# Patient Record
Sex: FEMALE | Race: BLACK OR AFRICAN AMERICAN | NOT HISPANIC OR LATINO | Employment: FULL TIME | URBAN - METROPOLITAN AREA
[De-identification: names, ages, dates, MRNs, and addresses within clinical notes are randomized per-mention and may not be internally consistent; named-entity substitution may affect disease eponyms.]

---

## 2017-01-04 ENCOUNTER — TELEPHONE (OUTPATIENT)
Dept: GASTROENTEROLOGY | Facility: CLINIC | Age: 57
End: 2017-01-04

## 2017-01-05 ENCOUNTER — TELEPHONE (OUTPATIENT)
Dept: GASTROENTEROLOGY | Facility: CLINIC | Age: 57
End: 2017-01-05

## 2017-01-05 NOTE — TELEPHONE ENCOUNTER
Called and spoke w pt  Ok w moving appt to 30th at 1  But to come in at 1230  Pt verbalized understanding  Mailing appt slip

## 2017-01-25 ENCOUNTER — TELEPHONE (OUTPATIENT)
Dept: GASTROENTEROLOGY | Facility: CLINIC | Age: 57
End: 2017-01-25

## 2017-01-30 ENCOUNTER — OFFICE VISIT (OUTPATIENT)
Dept: GASTROENTEROLOGY | Facility: CLINIC | Age: 57
End: 2017-01-30
Payer: COMMERCIAL

## 2017-01-30 VITALS
HEART RATE: 106 BPM | WEIGHT: 152.13 LBS | RESPIRATION RATE: 20 BRPM | SYSTOLIC BLOOD PRESSURE: 139 MMHG | BODY MASS INDEX: 25.97 KG/M2 | TEMPERATURE: 99 F | HEIGHT: 64 IN | DIASTOLIC BLOOD PRESSURE: 84 MMHG

## 2017-01-30 DIAGNOSIS — E55.9 VITAMIN D DEFICIENCY: ICD-10-CM

## 2017-01-30 DIAGNOSIS — K50.812 CROHN'S DISEASE OF BOTH SMALL AND LARGE INTESTINE WITH INTESTINAL OBSTRUCTION: Primary | ICD-10-CM

## 2017-01-30 DIAGNOSIS — C50.919 MALIGNANT NEOPLASM OF FEMALE BREAST, UNSPECIFIED LATERALITY, UNSPECIFIED SITE OF BREAST: ICD-10-CM

## 2017-01-30 PROCEDURE — 99999 PR PBB SHADOW E&M-EST. PATIENT-LVL IV: CPT | Mod: PBBFAC,,, | Performed by: INTERNAL MEDICINE

## 2017-01-30 PROCEDURE — 99215 OFFICE O/P EST HI 40 MIN: CPT | Mod: S$GLB,,, | Performed by: INTERNAL MEDICINE

## 2017-01-30 RX ORDER — POLYETHYLENE GLYCOL 3350 17 G/17G
17 POWDER, FOR SOLUTION ORAL DAILY PRN
COMMUNITY
End: 2017-06-15

## 2017-01-30 NOTE — LETTER
February 1, 2017      Jamie Polo MD  [address] [facility] [address]           [facility] - Gastroenterology  [address]  [place] [address]  Phone: [phone]  Fax: [phone]          Patient: Aline Fuller   MR Number: [id number]   YOB: 1960   Date of Visit: 1/30/2017       Dear Dr. Jamie Polo:    Thank you for referring Aline Fuller to me for evaluation. Attached you will find relevant portions of my assessment and plan of care.    If you have questions, please do not hesitate to call me. I look forward to following Aline Fuller along with you.    Sincerely,    Diamond Barbour MD    Enclosure  CC:  No Recipients    If you would like to receive this communication electronically, please contact [email or web address] or [phone] to request more information on [employer] access.    For providers and/or their staff who would like to refer a patient to [facility], please contact us through our one-stop-shop provider referral line, [facility], at [phone].    If you feel you have received this communication in error or would no longer like to receive these types of communications, please e-mail [email or web address]

## 2017-01-30 NOTE — PATIENT INSTRUCTIONS
Instructions:  - labs today  - CT enterography to be scheduled within the next 2-4 weeks  - Avoid all NSAIDs (Advil, Ibuprofen, Motrin, Aspirin, Naprosyn, Aleve)  - Yearly Pap Smears recommended  - Yearly Eye exam  - Yearly Skin exam--wear sun block and hats  - Use antibiotics with caution  - Vaccines needed:      Flu shot yearly      Tetanus every 10 years      Pneumonia 13 vaccine initial then repeat in 5 years and then one at age 63  - Follow up to be determined    Infliximab (Remicade): Biologics - Anti-TNF Agent    - Mechanism of action:  Remicade blocks TNF alpha which plays a role in the inflammatory process for inflammatory bowel disease  - Labs   - labs should be done every 4-6 months while on remicade  - TB test yearly  - hepatitis B testing prior to starting     Remicade Dosage:  - Loading Dose: 5 mg/kg IV week 0, 2, 6 (infused over 2 hours)  - Maintenance Dose: 5 mg/kg mg IV every 8 weeks  - 2-3 hour infusion    Risks of Remicade:  Stop therapy due to adverse event= 10% (10/100)    Please call us immediately if you develop any of the below problems    Allergic reactions  - <2% (2/100) develop infusion site reactions  - RARE- hives (rash), difficulty breathing, chest pain/tightness, high or low blood pressure, swelling of the face and hands, fever or chills    Serious Infections= 3% (3/100)  fever, tiredness, flu, open sores, warm red painful skin    Blood Disorders  fever that doesn't go away, bruising, bleeding, severe paleness    Non-Hodgkins Lymphoma=0.06% (6/10,000)    Drug related lupus-like reaction= 1% (1/100)  chest discomfort or pain that does not go away, shortness of breath, joint pain, rash on the cheeks or arms that gets worse in the sun    Psoriasis  new or worsening red scaly patches or raised bumps on the skin that are filled with pus     Case Reports Only: Multiple Sclerosis and Guillain [place] Syndrome  Numbness/weakness/tingling of your hands and feet, changes in your vision or  seizures    Case Reports Only: New or worsening Congestive Heart Failure  shortness of breath, swelling in your ankles or feet, sudden weight gain    Case Reports Only: Serious Liver Injury  jaundice (yellow skin or eyes), dark brown urine, right-sided abdominal pain, fever, severe itchiness    - All Immunizations ideally should be up to date prior to starting therapy--NO LIVE vaccines during therapy or 8 weeks prior to therapy  - Live Vaccines Include:   --Intranasal Influenza A/B  --Measles, Mumps, Rubella (MMR)  --Rotavirus  --Typhoid (oral)  --Vaccina (Smallpox)  --Varicella (Chicken Pox)  --Yellow Fever  --Zoster

## 2017-01-30 NOTE — MR AVS SNAPSHOT
[facility]  [address]  [facility] [address]  Phone: [phone]  Fax: [phone]                  Aline Fuller   2017 11:00 AM   Office Visit    Description:  Female : 1960   Provider:  Diamond Barbour MD   Department:  [facility]           Reason for Visit     Crohn's Disease           Diagnoses this Visit        Comments    Crohn's disease of both small and large intestine with intestinal obstruction    -  Primary            To Do List           Future Appointments        Provider Department Dept Phone    2017 1:45 PM LAB, SAME DAY [facility] [phone]    2017 9:00 AM [facility] CT6 MOBILE LIMIT 450 LBS [facility] [phone]      Goals (5 Years of Data)     None      [facility] On Call     [facility] On Call Nurse Care Line -  Assistance  Registered nurses in the [facility] Center provide clinical advisement, health education, appointment booking, and other advisory services.  Call for this free service at [phone].             Medications           Message regarding Medications     Verify the changes and/or additions to your medication regime listed below are the same as discussed with your clinician today.  If any of these changes or additions are incorrect, please notify your healthcare provider.        STOP taking these medications     VITAMIN B COMPLEX NO.12-NIACIN ORAL            Verify that the below list of medications is an accurate representation of the medications you are currently taking.  If none reported, the list may be blank. If incorrect, please contact your healthcare provider. Carry this list with you in case of emergency.           Current Medications     amlodipine (NORVASC) 2.5 MG tablet Take 2.5 mg by mouth once daily.    cholecalciferol, vitamin D3, 5,000 unit capsule Take 2,000 Units by mouth. 1 Capsule Oral Every day    cyanocobalamin 1,000 mcg/mL injection  "1,000 mcg every 30 days.     fluticasone (FLONASE) 50 mcg/actuation nasal spray 1 spray by Each Nare route daily as needed.     polyethylene glycol (MIRALAX) 17 gram/dose powder Take 17 g by mouth daily as needed.    potassium chloride (KLOR-CON) 8 MEQ TbSR Take 8 mEq by mouth once daily.     tamoxifen (NOLVADEX) 20 MG Tab Take 20 mg by mouth once daily.    triamcinolone acetonide 0.1% (KENALOG) 0.1 % cream daily as needed.            Clinical Reference Information           Vital Signs - Last Recorded  Most recent update: 1/30/2017 11:14 AM by Phyllis Jaimes RN    BP Pulse Temp Resp Ht Wt    139/84 (BP Location: Right arm, Patient Position: Sitting, BP Method: Automatic) 106 98.6 °F (37 °C) 20 5' 4" (1.626 m) 69 kg (152 lb 1.9 oz)    BMI                26.11 kg/m2          Blood Pressure          Most Recent Value    BP  139/84      Allergies as of 1/30/2017     Sulfa (Sulfonamide Antibiotics)      Immunizations Administered on Date of Encounter - 1/30/2017     None      Orders Placed During Today's Visit     Future Labs/Procedures Expected by Expires    C-reactive protein  1/30/2017 3/31/2018    CBC auto differential  1/30/2017 3/31/2018    Comprehensive metabolic panel  1/30/2017 3/31/2018    CT Enterography Abd_Pelvis With Contrast  1/30/2017 1/30/2018    Hepatitis B core antibody, total  1/30/2017 3/31/2018    Hepatitis B surface antibody  1/30/2017 3/31/2018    Hepatitis B surface antigen  1/30/2017 3/31/2018    Hepatitis C antibody  1/30/2017 3/31/2018    Sedimentation rate, manual  1/30/2017 3/31/2018    Thiopurine Methyltrans, RBC  1/30/2017 3/31/2018    Varicella zoster antibody, IgG  1/30/2017 3/31/2018    Vitamin B12  1/30/2017 3/31/2018    Vitamin D  1/30/2017 3/31/2018      Instructions    Instructions:  - labs today  - CT enterography to be scheduled within the next 2-4 weeks  - Avoid all NSAIDs (Advil, Ibuprofen, Motrin, Aspirin, Naprosyn, Aleve)  - Yearly Pap Smears recommended  - Yearly Eye " exam  - Yearly Skin exam--wear sun block and hats  - Use antibiotics with caution  - Vaccines needed:      Flu shot yearly      Tetanus every 10 years      Pneumonia 13 vaccine initial then repeat in 5 years and then one at age 63  - Follow up to be determined    Infliximab (Remicade): Biologics - Anti-TNF Agent    - Mechanism of action:  Remicade blocks TNF alpha which plays a role in the inflammatory process for inflammatory bowel disease  - Labs   - labs should be done every 4-6 months while on remicade  - TB test yearly  - hepatitis B testing prior to starting     Remicade Dosage:  - Loading Dose: 5 mg/kg IV week 0, 2, 6 (infused over 2 hours)  - Maintenance Dose: 5 mg/kg mg IV every 8 weeks  - 2-3 hour infusion    Risks of Remicade:  Stop therapy due to adverse event= 10% (10/100)    Please call us immediately if you develop any of the below problems    Allergic reactions  - <2% (2/100) develop infusion site reactions  - RARE- hives (rash), difficulty breathing, chest pain/tightness, high or low blood pressure, swelling of the face and hands, fever or chills    Serious Infections= 3% (3/100)  fever, tiredness, flu, open sores, warm red painful skin    Blood Disorders  fever that doesn't go away, bruising, bleeding, severe paleness    Non-Hodgkins Lymphoma=0.06% (6/10,000)    Drug related lupus-like reaction= 1% (1/100)  chest discomfort or pain that does not go away, shortness of breath, joint pain, rash on the cheeks or arms that gets worse in the sun    Psoriasis  new or worsening red scaly patches or raised bumps on the skin that are filled with pus     Case Reports Only: Multiple Sclerosis and Guillain [place] Syndrome  Numbness/weakness/tingling of your hands and feet, changes in your vision or seizures    Case Reports Only: New or worsening Congestive Heart Failure  shortness of breath, swelling in your ankles or feet, sudden weight gain    Case Reports Only: Serious Liver Injury  jaundice (yellow skin or  eyes), dark brown urine, right-sided abdominal pain, fever, severe itchiness    - All Immunizations ideally should be up to date prior to starting therapy--NO LIVE vaccines during therapy or 8 weeks prior to therapy  - Live Vaccines Include:   --Intranasal Influenza A/B  --Measles, Mumps, Rubella (MMR)  --Rotavirus  --Typhoid (oral)  --Vaccina (Smallpox)  --Varicella (Chicken Pox)  --Yellow Fever  --Zoster

## 2017-01-30 NOTE — PROGRESS NOTES
[facility]          Inflammatory Bowel Disease New Patient Consultation Note            Dr. Diamond Barbour    TODAY'S VISIT DATE:  1/30/2017    Reason for Consult:    Chief Complaint   Patient presents with    Crohn's Disease       PCP: Jamie Polo  [address] [facility] / [place] [address]    Referring MD:   Dr. Jamie Polo    History of Present Illness:    Dear. Dr. Jamie Polo    Thank you for requesting a consultation on your patient Aline Fuller who is a 56 y.o. female seen today at the [facility] on 01/30/2017 for inflammatory bowel disease- Crohn's disease.  Pertinent past medical history includes stage 1 breast cancer 6/2014  that was treated with lumpectomy followed by chemo/XRT (6/2014-4/2015) with no recurrence since then and remains on tamoxifen    As you know, Aline Fuller was doing well until in 1972 at which time she had abdominal pain, weight loss, diarrhea.  In 1973 she had exploratory laparotomy at which time 6 inches of colon removed but she recalls she was not given a diagnosis at that time. Between 5767-0598 she did well and had weight gain. IN 1979 she had a flare at which time 6 inches of her colon were removed.  She was officially diagnosed with Crohn's disease. She was on intermittent courses of prednisone for flares about 2 times/year and she recalls also taking flagyl at the time.  She would take prednisone for several months at a time.  She had rash to sulfasalazine and dipentum was not effective long term. She had 6MP though this caused bone marrow suppression and was ineffective after a year of treatment. She had progression of her symptoms and had a bowel obstruction due to stricturing Crohn's disease.  In 1997 she had small bowel resection and pt recalls again about 6 inches removed.  Between 3239-9305 she was on no medications.  In 2001 she recalls having fistula with abscess to buttocks treated with  drainage, antibiotics. She was on no medications from 5682-0864. There was recurrence of same fistulas in 2004 or 2005. She took asacol, colazal, 6MP, imuran. All were ineffective and possible anemia with 6MP/imuran. Overall these were ineffective for her Crohn's disease.  She took humira in approximately 8/2008. She took this for about 3 mos but was discontinued due to rash but pt felt it was ineffective for fistula closure. She had setons due to buttock fistulas in 2010.     She saw Dr. Polo in 2010 and had a colonoscopy in 10/2010 which showed perianal fistulas, anal stricture dilated, active inflammation at the ileocolonic anastomosis which could not be traversed. In 12/2010 she had perianal Crohn's disease with fistulas which was treated by dilation of rectal stricture and placement of setons.  She started on remicade in 2/2011 but she had to hold remicade after first dose due to UTI.  He performed an MRI in 4/2011 at which time she was found to have multiple perianal fistulas connecting to gluteal surface bilaterally of which one appears to cross midline connecting the right and left side of the ischial tuberosity.  There was also circumferential and asymmetric left rectal thickening.  She underwent EUA with seton placement and due to abscess received antibiotics and abscess spontaneously drained. She restarted remicade after treatment of her UTI in 5/2011.  Setons were removed in approximately 5/2011. In approximately 2012 she recalls that her dose frequency was increased to remicade 5 mg/kg every 6 weeks.  In 10/2011 she had setons removed and received cipro and also at that time decision was made to change remicade to every 6 weeks. . Colonoscopy in 8/2012 showed severe anal stenosis traversed after digital dilation with normal colon mucosa but no comment on anastomosis or neoterminal ileum. In 4/2013 was in ER for abdominal pain, diarrhea and fevers and sent home with cipro/flagyl. Dr. Polo felt this  was a gastroenteritis though CT showed active perianal disease. She had EGD due to dysphagia in 5/2013 at which time GE junction stenosis was found and there was minimal mucosal tear with passing the scope. She was diagnosed with stage 1 breast cancer that was treated with lumpectomy followed by chemo/XRT with no recurrence since then. She is on tamoxifen. Since 2012 she continues on remicade 5 mg/kg every 6 weeks though it was held during chemotherapy from 6/2014-4/2015. When she restarted this she took induction followed by maintenance remicade 5 mg/kg every 6 weeks.     She had a colonoscopy in 4/2015 which showed anal stricture that was dilated, inflammation characterized by deep ulcers in ptaches surrounding by normal mucosa in the ileocolonic anastomosis and rectum that was moderate in severity though worsened compared to prior scope.  No treatment change was made at that time.  She had EGD and colonoscopy in 10/2016.  EGD showed severe stenosis at 35 cm from incisors that was balloon dilated which helped her dysphagia but is not taking any PPI treatment. She reports that no one has ever discussed the cause of this stricture with her.  Colonoscopy in 10/2016 showed anal stricture with inflammation found throughout the colon and neoterminal ileum could not be intubated. In the past 18 months she has constipation.  She takes miralax since fall 2016 though she developed diarrhea while taking it. She is having trouble titrating her dose of miralax. She reports some heartburn along with some anxiety.  Patient has no other gastrointestinal/constitutional complaints including no fevers or chills, weight loss, nausea/vomiting (including no hematemesis), dysphagia, abdominal pain. Also patient does not have any extraintestinal manifestations of their inflammatory bowel disease including no eye pain/redness, skin lesions/rashes, joint problems, oral ulcers.    Pertinent Endoscopy/Imaging:  10/7/2010 Colonoscopy:  perianal fistulas and skin tags; anal stricture which was dilated with the index finger; evidence of a prior end-to-side ileo-colonic anastomosis in the ascending colon--this was characterized by congestion, edema, erosion, erythema, and friable mucosa (path-TI--the changes appear mostly acute with erosion and increased neutrophils within the lamina propria, focally producing cryptitis; granulomata are not identified); the exam of the entire colon was otherwise without abnormality  4/18/2011 MRI abdomen & pelvis:  MULTIPLE COMPLEX-APPEARING PERIANAL FISTULAS WHICH CONNECT WITH THE GLUTEAL SURFACE BILATERALLY, AND ONE OF WHICH APPEARS TO CROSS MIDLINE CONNECTING THE RIGHT AND LEFT SIDE AT THE LEVEL OF THE ISCHIAL TUBEROSITY.  NO EVIDENCE OF DEFINITE FISTULOUS COMMUNICATION TO THE VAGINA OR BLADDER;    CIRCUMFERENTIAL AND ASYMMETRIC LEFT RECTAL THICKENING WITHOUT DISCRETE MASS IDENTIFIED.  THIS IS NONSPECIFIC AND COULD BE RELATED TO PRIOR SURGERY OR INFLAMMATORY OR INFECTIOUS PROCESS; FIBROID UTERUS  8/20/2012 Colonoscopy: skin tags; benign-appearing, intrinsic severe stenosis found at the anus that was traversed after dilation with the index finger; normal mucosa in the rectum, sigmoid, proximal & distal transverse colon, proximal & distal ascending colon (path-colonic mucosa with focal architectural distortion; no active inflammation or dysplasia identified)  5/23/2013 EGD: benign-appearing, intrinsic moderate stenosis was found at the GE junction; minimal mucosal tear with passage of the endoscope; entire examined stomach and duodenum was normal  4/25/2014 US: Normal  4/7/2015 Colonoscopy: skin tags; anal stricture dilated with index finger up to PIP; inflammation characterized by deep ulcerations was found as patches surrounded by normal mucosa in the ileocolonic anastomosis and rectum--moderate in severity and worsened when compared to previous findings (path-cecum--colonic mucosa with acute inflammation,  ulceration and inflamed granulation tissue, no granulomas or dysplasia); the sigmoid, descending, splenic flexure, and ascending colons were spared  6/27/2016 CXR: normal  10/24/2016 EGD: severe benign-appearing, intrinsic stenosis was found at 35 cm from the incisors-dilation with a 10-11-12 mm pyloric balloon dilator was performed--reexamined and showed moderate improvement in luminal narrowing; entire examined stomach and duodenum was normal (no biopsies taken)  10/24/2016 Colonoscopy: anal stricture; inflammation characterized by serpentine ulcerations was found as medium-sized patches surrounded by normal mucosa in the rectum, as medium-sized patches surrounded by normal mucosa in the recto-sigmoid colon, as medium-sized patches surrounded by normal mucosa in the descending colon, as medium-sized patches surrounded by normal mucosa in the transverse colon, as medium-sized patches  surrounded by normal mucosa in the ascending colon and as  medium-sized patches surrounded by normal mucosa in the terminal  ileum. No sites were spared. This was moderate in severity, and when compared to previous examinations, the findings are worsened. Neoterminal ileum could not be intubated. (no biopsies taken)    Previous Surgeries:  1973 large bowel resection  1979 further colonic resection  12/17/2010: EUA, dilation of rectal stricture and placement of setons Xs 4    Pertinent Labs:  No results found for: STOOLCULTURE, CZAZHGYMRA2A, PBTLPRIKFI6C, CDIFFICILEAN, CDIFFTOX, CDIFFICILEBY  Lab Results   Component Value Date    CRP 8.0 06/27/2016     No results found for: [id number]  No results found for: HEPBIGM, HEPBCAB, HEPBSURFABQU  No results found for: VARICELLAZOS, VARICELLAINT  Lab Results   Component Value Date    NIL 0.06 06/27/2016    TBAG 0.06 06/27/2016    TBAGNIL 0.00 06/27/2016    MITOGENNIL >10.00 06/27/2016    TBGOLD Negative 06/27/2016     No results found for: TPMTRESULT  No results found for: ANSADAINIT,  INFLIXIMAB, INFLIXINTERP    Prior IBD Therapies:  Prednisone  Sulfasalazine- rash  Dipentum- ineffective  6MP- bone marrow suppression  Flagyl/Cipro- effective   Asacol- ineffective  Colazal- ineffective  Imuran  Humira-rash- 2008 or 2009    Current IBD Therapies:  Remicade 5 mg/kg every 6 weeks; Remicade 8117-9645--reinitiated 2016 with induction and then every 6 weeks maintenance      Vaccinations:  Flu shot: 2016  Chickenpox status/Varicella vaccine: never had chickenpox  No results found for: VARICELLAZOS, VARICELLAINT  Tetanus: not sure  Pneumonia vaccine: had one but not sure when she has had one   Hepatitis B: 2011  Lab Results   Component Value Date    HEPBSAB Negative 09/15/2010   Hepatitis A: 2011  HPV: NA   Meningococcal: NA     NSAID use/indication:  no    Narcotic use:  No    Alternative/Complementary Meds for IBD:  No    Review of Systems   Constitutional: Negative for chills, fever and weight loss.   Eyes: Negative for blurred vision, pain and redness.   Respiratory: Negative for cough and shortness of breath.    Cardiovascular: Negative for chest pain.   Gastrointestinal: Positive for heartburn. Negative for abdominal pain, nausea and vomiting.   Genitourinary: Negative for dysuria and hematuria.   Musculoskeletal: Negative for back pain.   Skin: Negative for rash.   Psychiatric/Behavioral: Negative for depression. The patient is nervous/anxious.      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   has a past surgical history that includes Colonoscopy; Esophagogastroduodenoscopy; colon resections; Appendectomy; Knee surgery (left); Breast lumpectomy (Right); fistula repairs; Colonoscopy (N/A, 10/24/2016); Upper gastrointestinal  endoscopy; and Colon surgery.    Family History: family history includes Breast cancer in her sister; Cancer (age of onset: 60) in her mother; Colon cancer in her mother; Heart attack in her father; Heart disease in her brother and paternal uncle; Irritable bowel syndrome in her sister; Ovarian cancer in her maternal aunt; Pancreatic cancer in her maternal grandmother; Stomach cancer in her maternal uncle. There is no history of Celiac disease, Cirrhosis, Colon polyps, Crohn's disease, Cystic fibrosis, Esophageal cancer, Hemochromatosis, Inflammatory bowel disease, Liver cancer, Liver disease, Rectal cancer, Ulcerative colitis, or Gadiel's disease..     Social History:  reports that she has never smoked. She has never used smokeless tobacco. She reports that she does not drink alcohol or use illicit drugs.    Review of patient's allergies indicates:   Allergen Reactions    Sulfa (sulfonamide antibiotics)      Other reaction(s): Rash     Outpatient Prescriptions Marked as Taking for the 1/30/17 encounter (Office Visit) with Diamond Barbour MD   Medication Sig Dispense Refill    amlodipine (NORVASC) 2.5 MG tablet Take 2.5 mg by mouth once daily.      cholecalciferol, vitamin D3, 5,000 unit capsule Take 2,000 Units by mouth. 1 Capsule Oral Every day      cyanocobalamin 1,000 mcg/mL injection 1,000 mcg every 30 days.   1    fluticasone (FLONASE) 50 mcg/actuation nasal spray 1 spray by Each Nare route daily as needed.   0    INFLIXIMAB (REMICADE IV) Inject 5 mg/kg into the vein every 6 weeks.      polyethylene glycol (MIRALAX) 17 gram/dose powder Take 17 g by mouth daily as needed.      potassium chloride (KLOR-CON) 8 MEQ TbSR Take 8 mEq by mouth once daily.       tamoxifen (NOLVADEX) 20 MG Tab Take 20 mg by mouth once daily.      triamcinolone acetonide 0.1% (KENALOG) 0.1 % cream daily as needed.       [DISCONTINUED] POLYETHYLENE GLYCOL 3350 (MIRALAX ORAL) Take by mouth.       Current Facility-Administered  Medications for the 1/30/17 encounter (Office Visit) with Diamond Barbour MD   Medication Dose Route Frequency Provider Last Rate Last Dose    acetaminophen tablet 1,000 mg  1,000 mg Oral PRN Jamie Polo MD   1,000 mg at 04/25/14 1223    acetaminophen tablet 1,000 mg  1,000 mg Oral PRN Jamie Polo MD   1,000 mg at 07/11/14 1312    diphenhydrAMINE injection 25 mg  25 mg Intravenous PRN Jamie Polo MD   25 mg at 04/25/14 1223    diphenhydrAMINE injection 25 mg  25 mg Intravenous PRN Jamie Polo MD   25 mg at 07/11/14 1317       Vital Signs:  BP: 139/84 Temp: 98.6 °F (37 °C) Pulse: 106 Resp: 20  Weight: 69 kg (152 lb 1.9 oz)      Physical Exam   Constitutional: She is oriented to person, place, and time. She appears well-developed.   HENT:   Mouth/Throat: Oropharynx is clear and moist. No oral lesions.   No oral ulcers or thrush   Eyes: Conjunctivae are normal. Pupils are equal, round, and reactive to light.   Cardiovascular: Normal rate and regular rhythm.    Pulmonary/Chest: Effort normal and breath sounds normal.   Abdominal: Soft. There is no tenderness.   Musculoskeletal:        Right lower leg: She exhibits no swelling.        Left lower leg: She exhibits no swelling.   Neurological: She is alert and oriented to person, place, and time.   Skin: No rash noted.   Psychiatric: She has a normal mood and affect.   Nursing note and vitals reviewed.    Labs:  No results found for: STOOLCULTURE, [id number], [id number], CDIFFICILEAN, CDIFFTOX, CDIFFICILEBY  Lab Results   Component Value Date    WBC 6.24 06/27/2016    HGB 10.2 (L) 06/27/2016    HCT 31.6 (L) 06/27/2016    MCV 88 06/27/2016     (H) 06/27/2016    ALT 24 06/27/2016    AST 24 06/27/2016    ALKPHOS 94 06/27/2016    BILITOT 0.3 06/27/2016    SEDRATE 58 (H) 06/27/2016    CRP 8.0 06/27/2016     Lab Results   Component Value Date    HEPBSAB Negative 09/15/2010     No results found for: VARICELLAZOS, VARICELLAINT  Lab Results    Component Value Date    NIL 0.06 06/27/2016    TBAG 0.06 06/27/2016    TBAGNIL 0.00 06/27/2016    MITOGENNIL >10.00 06/27/2016    TBGOLD Negative 06/27/2016     No results found for: TPMTRESULT  No results found for: ANSADAINIT, INFLIXIMAB, INFLIXINTERP    Assessment/Plan:  Aline Fuller is a 56 y.o. female with fistulizing ileocolonic Crohn's disease with symptom onset in 1972 and diagnosis in 1979 and multiple surgeries including: Ex lap with 6 inches of colon removed (1973), Ex lap with 6 inches of colon removed (1979), and Ex lap with 6 inches of colon removed (1997).  She has a pertinent past medical history of breast cancer.  She initially started with symptoms of abdominal pain, weight loss, and diarrhea in 1972 and had an ex lap in 1973 with 6 inches of colon removed that was non-diagnostic for CD.  She was not diagnosed until 1979 after her second ex lap with 6 inches of small bowel removed.  She was initially treated with several courses of prednisone.  Early in her diagnosis, she took sulfasalazine though it was discontinued due to a rash, dipentum though it was not effective long term, and imuran though it had to be discontinued due to bone marrow suppression.  Her symptoms progressed which resulted in an SBO that prompted her third ex lap in 1997 that resulted in 6 inches of small bowel being ressected.  She was in clinic remission and on no medications from 1997 to 2001 at which time she recalls having a fistula and and abscess that required I&D and ABX.  She had recurrence of fistula and abscess in 2004 or 2005.  She also took asacol, colazal, 6MP, and imuran that were all ineffective though possible anemia with 6MP/Imuran.  In 2008 she started humira with induction and maintenance but discontinued after 3 months because patient broke out with a rash though she also felt it was ineffective.   She established care with Dr. Polo in 2010 and had a colonoscopy 10/2010 which showed fistulas, anal  stricture dilated, and active inflammation at the ileocolonic anastomosis that could not be traversed.  In 12/2010 she had perianal Crohn's disease that was treated with dilation of rectal stricture and seton placement.  She started remicade 2/2011.  MRI 4/2011 showed multiple perianal fistulas with seton removal in approximately 5/2011.  At some time in 2012, her remicade dose frequency was increased to 5 mg/kg to every 6 weeks.  She was diagnosed with stage 1 breast cancer 6/2014  that was treated with lumpectomy followed by chemo/XRT with no recurrence since then and remains on tamoxifen. Since 2012 she continues on remicade 5 mg/kg every 6 weeks though it was held during chemotherapy from 6/2014-4/2015 and restarted with induction and maintenance of 5 mg/kg every 6 weeks.   Her most recent colonoscopy 10/2016 showed continued anal stricture with inflammation throughout the colon and neoterminal ileum could not be intubated.  She currently has stable symptoms with1 formed BM/day to every other day with some constipation for the last 18 months.      Ms Fuller has longstanding fistulizing (perianal fistula/abscess) Crohn's disease with multiple surgeries including three partial colectomies (per patient 6 inches of her colon taken out each time) and also carries a history of breast cancer that is in remission. Her remicade was held during the time of her breast cancer treatment so she could have developed abs at this time.  She has a colonoscopy 10/2016 which shows worsening Crohn's disease per Dr. Polo and anastomotic stricture. She reports worsening constipation. I am going to start by getting SB imaging to evaluate the neoterminal ileum that cannot be evaluated endoscopically and then based on these results see if we can proceed with medical therapy or surgery. I would also like to get remicade levels/abs done before her next infusion and we will coordinate this after her visit today.  We will also try to  coordinate same day visit and testing given distance patient is driving.     # Fistulizing ileocolonic Crohn's disease with multiple surgeries including: Ex lap with 6 inches of colon removed (1973), Ex lap with 6 inches of colon removed (1979), and Ex lap with 6 inches of colon removed (1997) with worsening disease on colonoscopy and anastomotic stricture with no imaging or endoscopy of the small intestine above stricture  - we had a long discussion with patient regarding epidemiology, potential causes/triggers (avoiding NSAIDS (stop aleve), antibiotics if possible, stress and smoking), diagnosis, management goals and treatment options  - basic labs: CBC, CMP, ESR, CRP, vitamin B12, vitamin D  - drug monitoring labs: CBC/LFTs with every infusion; Hep B testing today, TPMT today, TB Quantiferon negative 6/2016, discussed remicade levels/abs that we may consider with next infusion  - continue remicade 5 mg/kg every 6 weeks   - CT Enterography to evaluate small bowel above stricture to help determine future treatment plan- medical therapy vs surgery    # History of Stage 1 Breast Cancer 6/2014    - treated with lumpectomy followed by chemo/XRT (6/2014-4/2015)  - no recurrence since completion of treatment  - remain in tamoxifen with close follow-up  - will need to keep this in mind regarding her immunosuppression related to her Crohn's disease    # IBD specific health maintenance- long term immunosuppression:  Colorectal cancer risk:    Risks factors: >1/3 of colon involved with colitis and >8-10 years of IBD duration  - colonoscopy:  every 1-2 years for surveillance once in endoscopic remission    Pap smear recommended yearly   Opthamologic exam recommended yearly    Dermatologic exam recommended yearly     Bone health:  Risk of osteopenia/osteoporosis:  Risks factors: none  Vitamin D: No results found for: [id number]   - currently taking Vitamin D3 5000 IU daily    Vaccine counseling:  - No LIVE VACCINES--reminded  in clinic today  - VZV IgG, HBsAb today   - Tetanus every and pneumonia vaccine recommended if not up to date    Follow up: to be determined after CT Enterography    Total visit time was 60 minutes, more than 50% of which was spent in face-to-face counseling with patient regarding evaluation and management of her Crohn's disease.     Thank you again for sending Aline Fuller to see Dr. Diamond Barbour today at the [facility]. Please don't hesitate to contact Dr. Barbour if there are any questions regarding this evaluation, or if you have any other patients with inflammatory bowel disease for whom you would like a consultation. You can reach Dr. Barbour at [phone] or by email at [email or web address]    Diamond Barbour MD  Department of Gastroenterology  Medical Director, Inflammatory Bowel Disease

## 2017-01-31 PROBLEM — K50.812 CROHN'S DISEASE OF BOTH SMALL AND LARGE INTESTINE WITH INTESTINAL OBSTRUCTION: Status: ACTIVE | Noted: 2017-01-31

## 2017-01-31 PROBLEM — E55.9 VITAMIN D DEFICIENCY: Status: ACTIVE | Noted: 2017-01-31

## 2017-02-01 DIAGNOSIS — K50.812 CROHN'S DISEASE OF BOTH SMALL AND LARGE INTESTINE WITH INTESTINAL OBSTRUCTION: Primary | ICD-10-CM

## 2017-02-01 NOTE — PROGRESS NOTES
Called patient and reviewed all lab test results  - ESR elevated  - she is on vit D 3 2000 IU daily- vit D 25  - would like to coordinate remicade levels/abs with CT so will call my office if able to; the date would be 2/23. CT currently scheduled for 2/27  - she will call if able to change date to 2/23 and remicade levels/abs orders already in and would need to be scheduled on 2/23 at [facility]

## 2017-02-02 ENCOUNTER — TELEPHONE (OUTPATIENT)
Dept: GASTROENTEROLOGY | Facility: CLINIC | Age: 57
End: 2017-02-02

## 2017-02-27 ENCOUNTER — HOSPITAL ENCOUNTER (OUTPATIENT)
Dept: RADIOLOGY | Facility: HOSPITAL | Age: 57
Discharge: HOME | End: 2017-02-27
Attending: INTERNAL MEDICINE
Payer: COMMERCIAL

## 2017-02-27 DIAGNOSIS — K50.812 CROHN'S DISEASE OF BOTH SMALL AND LARGE INTESTINE WITH INTESTINAL OBSTRUCTION: ICD-10-CM

## 2017-02-27 PROCEDURE — 74177 CT ABD & PELVIS W/CONTRAST: CPT | Mod: 26,,, | Performed by: RADIOLOGY

## 2017-02-27 PROCEDURE — 25500020 PHARM REV CODE 255: Performed by: INTERNAL MEDICINE

## 2017-02-27 PROCEDURE — 74177 CT ABD & PELVIS W/CONTRAST: CPT | Mod: TC

## 2017-02-27 RX ADMIN — IOHEXOL 120 ML: 350 INJECTION, SOLUTION INTRAVENOUS at 11:02

## 2017-03-10 ENCOUNTER — TELEPHONE (OUTPATIENT)
Dept: GASTROENTEROLOGY | Facility: CLINIC | Age: 57
End: 2017-03-10

## 2017-03-10 NOTE — TELEPHONE ENCOUNTER
----- Message from Diamond Barbour MD sent at 3/10/2017  4:25 PM CST -----  Helene please fax the CTE report to Dr. Adilene Stephen (GYN doctor for patient)  Dr. Stephen's office number is [phone].     Thanks  SS

## 2017-03-10 NOTE — TELEPHONE ENCOUNTER
I had a long discussion with Ms Jonny reviewing recent CTE results and IFX levels and recommend remicade 10 mg/kg every 6 weeks due to undetectable trough levels with no abs.  Plan to repeat colonoscopy in mid July with followup on same day with Dr. Barbour after 3 mos of higher dose of remicade.     Dr. Polo is prescribing her remicade so I will have him increase the dose given its in [place] and once approved 3 mos later make her appt for colonoscopy andf/u    Pt previously did not wish to come for appt to discuss these results and wished to do phone discussion but now willing to come for next appt in July 2017

## 2017-03-16 ENCOUNTER — TELEPHONE (OUTPATIENT)
Dept: GASTROENTEROLOGY | Facility: CLINIC | Age: 57
End: 2017-03-16

## 2017-03-16 NOTE — TELEPHONE ENCOUNTER
Call patient and let her know that I have talked to Dr. Polo who will be prescribing the higher dose of remicade at 10 mg/kg every 6 weeks.  If any issues please do call but I would like to have her see me in about 2 months so please schedule.

## 2017-03-16 NOTE — TELEPHONE ENCOUNTER
Called and spoke to pt  Advised of dr zavala note  Scheduled appt for June 15th  This will be 2 months after the new infusion dose increase  Pt verbalized understanding  Mailing appt slip

## 2017-03-23 ENCOUNTER — TELEPHONE (OUTPATIENT)
Dept: GASTROENTEROLOGY | Facility: CLINIC | Age: 57
End: 2017-03-23

## 2017-03-23 NOTE — TELEPHONE ENCOUNTER
----- Message from Jamie Polo MD sent at 3/23/2017 12:42 PM CDT -----  Contact: Ms Waldrop/   infusion clinic [phone]  6 weeks from the last dose and duration is for a year.    ----- Message -----     From: Bouchra Zhang MA     Sent: 3/23/2017  11:42 AM       To: MD Dr. Christ Michaels,  Please tell me what to write for the duration start and for how long   Thanks  ----- Message -----     From: Cuca Parnell     Sent: 3/23/2017  11:32 AM       To: [facility] Staff    Ms Waldrop states received fax order for patient Remicade IV for six weeks.    Need duration date for how long. Need diagnosis written by code  Fax number is [phone].  Ms Palma/   infusion clinic [phone]

## 2017-04-13 ENCOUNTER — TELEPHONE (OUTPATIENT)
Dept: GASTROENTEROLOGY | Facility: CLINIC | Age: 57
End: 2017-04-13

## 2017-04-13 NOTE — TELEPHONE ENCOUNTER
Spoke with cain and informed her the patient authorization was good until 7/2018 faxed over approval letter

## 2017-04-13 NOTE — TELEPHONE ENCOUNTER
----- Message from Joy Reyes sent at 4/13/2017  2:37 PM CDT -----  Contact: Jenifer xie/[facility]# [phone]  Jarrett xie/[facility] states she was calling to speak to the nurse about the Pt. Procedure 4/14 and would like a phone call back# [phone]

## 2017-06-15 ENCOUNTER — OFFICE VISIT (OUTPATIENT)
Dept: GASTROENTEROLOGY | Facility: CLINIC | Age: 57
End: 2017-06-15
Payer: COMMERCIAL

## 2017-06-15 VITALS
HEIGHT: 64 IN | DIASTOLIC BLOOD PRESSURE: 80 MMHG | TEMPERATURE: 99 F | RESPIRATION RATE: 13 BRPM | BODY MASS INDEX: 25.6 KG/M2 | SYSTOLIC BLOOD PRESSURE: 128 MMHG | WEIGHT: 149.94 LBS | HEART RATE: 92 BPM

## 2017-06-15 DIAGNOSIS — K50.812 CROHN'S DISEASE OF BOTH SMALL AND LARGE INTESTINE WITH INTESTINAL OBSTRUCTION: Primary | ICD-10-CM

## 2017-06-15 PROCEDURE — 99999 PR PBB SHADOW E&M-EST. PATIENT-LVL IV: CPT | Mod: PBBFAC,,, | Performed by: INTERNAL MEDICINE

## 2017-06-15 PROCEDURE — 99215 OFFICE O/P EST HI 40 MIN: CPT | Mod: S$GLB,,, | Performed by: INTERNAL MEDICINE

## 2017-06-15 RX ORDER — BISACODYL 5 MG
5 TABLET, DELAYED RELEASE (ENTERIC COATED) ORAL DAILY PRN
COMMUNITY
End: 2021-07-13

## 2017-06-15 NOTE — PROGRESS NOTES
[facility]             Inflammatory Bowel Disease Follow-up  Note            Dr. Diamond Barbour  TODAY'S VISIT DATE:  6/15/2017    Chief Complaint:       PCP: Jamie Polo    Previous History:  Aline Fuller is a 57 y.o. female with fistulizing ileocolonic Crohn's disease with symptom onset in 1972 and diagnosis in 1979 and multiple surgeries including: Ex lap with 6 inches of colon removed (1973), Ex lap with 6 inches of colon removed (1979), and Ex lap with 6 inches of colon removed (1997).  She has a pertinent past medical history of breast cancer.  She initially started with symptoms of abdominal pain, weight loss, and diarrhea in 1972 and had an ex lap in 1973 with 6 inches of colon removed that was non-diagnostic for CD.  She was not diagnosed until 1979 after her second ex lap with 6 inches of small bowel removed.  She was initially treated with several courses of prednisone.  Early in her diagnosis, she took sulfasalazine though it was discontinued due to a rash, dipentum though it was not effective long term, and imuran though it had to be discontinued due to bone marrow suppression.  Her symptoms progressed which resulted in an SBO that prompted her third ex lap in 1997 that resulted in 6 inches of small bowel being ressected.  She was in clinic remission and on no medications from 1997 to 2001 at which time she recalls having a fistula and and abscess that required I&D and ABX.  She had recurrence of fistula and abscess in 2004 or 2005.  She also took asacol, colazal, 6MP, and imuran that were all ineffective though possible anemia with 6MP/Imuran.  In 2008 she started humira with induction and maintenance but discontinued after 3 months because patient broke out with a rash though she also felt it was ineffective.   She established care with Dr. Polo in 2010 and had a colonoscopy 10/2010 which showed fistulas, anal stricture dilated, and active inflammation at the  ileocolonic anastomosis that could not be traversed.  In 12/2010 she had perianal Crohn's disease that was treated with dilation of rectal stricture and seton placement.  She started remicade 2/2011.  MRI 4/2011 showed multiple perianal fistulas with seton removal in approximately 5/2011.  At some time in 2012, her remicade dose frequency was increased to 5 mg/kg to every 6 weeks.  She was diagnosed with stage 1 breast cancer 6/2014  that was treated with lumpectomy followed by chemo/XRT with no recurrence since then and remains on tamoxifen. Since 2012 she continues on remicade 5 mg/kg every 6 weeks though it was held during chemotherapy from 6/2014-4/2015 and restarted with induction and maintenance of 5 mg/kg every 6 weeks.   Her most recent colonoscopy 10/2016 showed continued anal stricture with inflammation throughout the colon and neoterminal ileum could not be intubated.  She currently has stable symptoms with1 formed BM/day to every other day with some constipation for the last 18 months.     Interval History:  - current IBD meds: Remicade 10 mg/kg every 6 weeks  (prescribed by Dr. Polo and pt has infusions in [place], [place])  - 2/2017 [place]--trough IFX levels/ab- undetectable trough levels with no Abs  - 2/27/17 CTE: Active CD involving the descending and sigmoid colon and rectum.  Decreased caliber at the ileocolic anastomosis with possible mucosal hyperenhancement.  Note is made of focal decreased caliber within the rectum, possibly transient.  Multiple perianal fistulas without evidence for abscess; heterogeneous enhancement within the uterus.  Appearance is not certainly secondary to leiomyomas.  Given postmenopausal age group, recommend further evaluation with pelvic ultrasound to assess endometrium.  - 1 soft Bowel Movements/day  - constitutional/GI symptoms: no fevers/chills, weight loss, dysphagia, GERD, abdominal pain  - extraintestinal manifestations: no eye pain/redness, skin lesions/rashes, liver  problems, joint pain/swelling/stiffness    Previous Surgeries:  1973 large bowel resection  1979 further colonic resection  12/17/2010: EUA, dilation of rectal stricture and placement of setons Xs 4    Pertinent Endoscopy/Imaging:  10/7/2010 Colonoscopy: perianal fistulas and skin tags; anal stricture which was dilated with the index finger; evidence of a prior end-to-side ileo-colonic anastomosis in the ascending colon--this was characterized by congestion, edema, erosion, erythema, and friable mucosa (path-TI--the changes appear mostly acute with erosion and increased neutrophils within the lamina propria, focally producing cryptitis; granulomata are not identified); the exam of the entire colon was otherwise without abnormality  4/18/2011 MRI abdomen & pelvis:  MULTIPLE COMPLEX-APPEARING PERIANAL FISTULAS WHICH CONNECT WITH THE GLUTEAL SURFACE BILATERALLY, AND ONE OF WHICH APPEARS TO CROSS MIDLINE CONNECTING THE RIGHT AND LEFT SIDE AT THE LEVEL OF THE ISCHIAL TUBEROSITY.  NO EVIDENCE OF DEFINITE FISTULOUS COMMUNICATION TO THE VAGINA OR BLADDER;    CIRCUMFERENTIAL AND ASYMMETRIC LEFT RECTAL THICKENING WITHOUT DISCRETE MASS IDENTIFIED.  THIS IS NONSPECIFIC AND COULD BE RELATED TO PRIOR SURGERY OR INFLAMMATORY OR INFECTIOUS PROCESS; FIBROID UTERUS  8/20/2012 Colonoscopy: skin tags; benign-appearing, intrinsic severe stenosis found at the anus that was traversed after dilation with the index finger; normal mucosa in the rectum, sigmoid, proximal & distal transverse colon, proximal & distal ascending colon (path-colonic mucosa with focal architectural distortion; no active inflammation or dysplasia identified)  5/23/2013 EGD: benign-appearing, intrinsic moderate stenosis was found at the GE junction; minimal mucosal tear with passage of the endoscope; entire examined stomach and duodenum was normal  4/25/2014 US: Normal  4/7/2015 Colonoscopy: skin tags; anal stricture dilated with index finger up to PIP; inflammation  characterized by deep ulcerations was found as patches surrounded by normal mucosa in the ileocolonic anastomosis and rectum--moderate in severity and worsened when compared to previous findings (path-cecum--colonic mucosa with acute inflammation, ulceration and inflamed granulation tissue, no granulomas or dysplasia); the sigmoid, descending, splenic flexure, and ascending colons were spared  6/27/2016 CXR: normal  10/24/2016 EGD: severe benign-appearing, intrinsic stenosis was found at 35 cm from the incisors-dilation with a 10-11-12 mm pyloric balloon dilator was performed--reexamined and showed moderate improvement in luminal narrowing; entire examined stomach and duodenum was normal (no biopsies taken)  10/24/2016 Colonoscopy: anal stricture; inflammation characterized by serpentine ulcerations was found as medium-sized patches surrounded by normal mucosa in the rectum, as medium-sized patches surrounded by normal mucosa in the recto-sigmoid colon, as medium-sized patches surrounded by normal mucosa in the descending colon, as medium-sized patches surrounded by normal mucosa in the transverse colon, as medium-sized patches  surrounded by normal mucosa in the ascending colon and as  medium-sized patches surrounded by normal mucosa in the terminal  ileum. No sites were spared. This was moderate in severity, and when compared to previous examinations, the findings are worsened. Neoterminal ileum could not be intubated. (no biopsies taken)  2/27/17 CTE: Active CD involving the descending and sigmoid colon and rectum.  Decreased caliber at the ileocolic anastomosis with possible mucosal hyperenhancement.  Note is made of focal decreased caliber within the rectum, possibly transient.  Multiple perianal fistulas without evidence for abscess; heterogeneous enhancement within the uterus.  Appearance is not certainly secondary to leiomyomas.  Given postmenopausal age group, recommend further evaluation with pelvic  ultrasound to assess endometrium.    Pertinent Labs:  6/2016 TB quantiferon neg  1/30/17 CRP 3.2  1/30/17 TPMT normal  1/30/17 vit D 25  1/30/17 hepatitis B testing negative   1/30/17 VZV IgG negative  2/2017 [place] IFX level <1.0, Ab neg    Prior IBD Therapies:  Prednisone  Sulfasalazine- rash  Dipentum- ineffective  6MP- bone marrow suppression  Flagyl/Cipro- effective   Asacol- ineffective  Colazal- ineffective  Imuran  Humira-rash- 2008 or 2009    Current IBD Therapies:  Remicade 5 mg/kg every 6 weeks; Remicade 6632-2092--reinitiated 2016 with induction and then every 6 weeks maintenance      Vaccinations:  Flu shot: 2016  Chickenpox status/Varicella vaccine: never had chickenpox, VZV IgG negative  Tetanus: not sure  Pneumonia vaccine: had one but not sure when she has had one   Hepatitis B: 2011  Lab Results   Component Value Date    HEPBSAB Negative 09/15/2010   Hepatitis A: 2011  HPV: NA   Meningococcal: NA     NSAID use/indication:  no    Narcotic use:  No    Alternative/Complementary Meds for IBD:  No    Review of Systems   Constitutional: Negative for chills, fever and weight loss.   HENT:        No oral ulcers, dysphagia, oral thrush   Eyes: Negative for blurred vision, pain and redness.   Respiratory: Negative for cough and shortness of breath.    Cardiovascular: Negative for chest pain.   Gastrointestinal: Positive for heartburn. Negative for abdominal pain, nausea and vomiting.   Genitourinary: Negative for dysuria and hematuria.   Musculoskeletal: Negative for back pain and joint pain.   Skin: Negative for rash.   Psychiatric/Behavioral: Negative for depression. The patient is not nervous/anxious and does not have insomnia.      All Medical History/Surgical History/Family History/Social History/Allergies have been reviewed and updated in EMR    Review of patient's allergies indicates:   Allergen Reactions    Sulfa (sulfonamide antibiotics)      Other reaction(s): Rash     Outpatient Prescriptions  "Marked as Taking for the 6/15/17 encounter (Office Visit) with Diamond Barbour MD   Medication Sig Dispense Refill    bisacodyl (DULCOLAX) 5 mg EC tablet Take 5 mg by mouth daily as needed for Constipation.      cholecalciferol, vitamin D3, 5,000 unit capsule Take 2,000 Units by mouth. 1 Capsule Oral Every day      cyanocobalamin 1,000 mcg/mL injection 1,000 mcg every 30 days.   1    fluticasone (FLONASE) 50 mcg/actuation nasal spray 1 spray by Each Nare route daily as needed.   0    INFLIXIMAB (REMICADE IV) Inject 5 mg/kg into the vein every 6 weeks.      tamoxifen (NOLVADEX) 20 MG Tab Take 20 mg by mouth once daily.      triamcinolone acetonide 0.1% (KENALOG) 0.1 % cream daily as needed.       [DISCONTINUED] polyethylene glycol (MIRALAX) 17 gram/dose powder Take 17 g by mouth daily as needed.           Vital Signs:               /80 (BP Location: Left arm, Patient Position: Sitting)   Pulse 92   Temp 98.6 °F (37 °C)   Resp 13   Ht 5' 4" (1.626 m)   Wt 68 kg (149 lb 14.6 oz)   BMI 25.73 kg/m²     Physical Exam   Constitutional: She is oriented to person, place, and time. She appears well-developed.   HENT:   Mouth/Throat: Oropharynx is clear and moist. No oral lesions.   No oral ulcers or thrush   Eyes: Conjunctivae are normal. Pupils are equal, round, and reactive to light.   Cardiovascular: Normal rate and regular rhythm.    Pulmonary/Chest: Effort normal and breath sounds normal.   Abdominal: Soft. There is no tenderness.   Musculoskeletal:        Right lower leg: She exhibits no swelling.        Left lower leg: She exhibits no swelling.   Neurological: She is alert and oriented to person, place, and time.   Skin: No rash noted.   Psychiatric: She has a normal mood and affect.   Nursing note and vitals reviewed.      Labs: Reviewed and pertinent noted above    Assessment/Plan:  Aline Fuller is a 57 y.o. female with  longstanding fistulizing (perianal fistula/abscess) Crohn's disease with " multiple surgeries including three partial colectomies (per patient 6 inches of her colon taken out each time) and also carries a history of breast cancer that is in remission. She has a colonoscopy 10/2016 which shows worsening CD with anastomotic stricture and anal stricture. She has continued constipation which is likely related to this anal stricture. She takes miralax thought not always helpful and takes dulcolax. At the time of her last visit we did IFX levels/abs which showed no Abs with undetectable levels.  Increase dose of remicade was given at 10 mg/kg every 6 weeks and she has had 2 increased doses of this now.  It is time for colonoscopy to reassess disease activity and given her constipation symptoms but pt does not feel she can get this done until July or August due to personal issues so will call back to get this scheduled. WE will repeat trough IFX levels (pt unable to come here so will need to change assay to [facility] level) right before next IFX infusion.  If remicade ineffective we should consider stelara or entyvio especially due to her history of breast cancer.  Patient is concerned that her fistulas may return if we change from remicade to alternate biologic and though I would hope not there is no guarantee and I have discussed this with her.      # Fistulizing ileocolonic Crohn's disease with multiple surgeries including: Ex lap with 6 inches of colon removed (1973), Ex lap with 6 inches of colon removed (1979), and Ex lap with 6 inches of colon removed (1997) with worsening disease on colonoscopy and anastomotic stricture with no imaging or endoscopy of the small intestine above stricture  - continue remicade 10 mg/kg every 6 weeks  - basic labs: CBC, CMP  - drug monitoring labs: 6/2016 TB quantiferon negative; repeat today; hepatitis B neg 2017; [facility] IFX levels on 7/6/17  - miralax daily; okay to take dulcolax as needed if miralax not effective  - colonoscopy with anal stricture dilation  may help constipation symptoms- pt to call when she is able to proceed- preferably by August- Tues or Thursday morning  - pt asked that once she have scheduled your colonoscopy send me an email so we can get you scheduled for same day appt with me or Kimberly  - if remicade higher dose not effective next steps would be entyvio vs stelara     # History of Stage 1 Breast Cancer 6/2014    - treated with lumpectomy followed by chemo/XRT (6/2014-4/2015)  - no recurrence since completion of treatment  - remain in tamoxifen with close follow-up  - will need to keep this in mind regarding her immunosuppression related to her Crohn's disease     # IBD specific health maintenance- long term immunosuppression:  Colorectal cancer risk:    Risks factors: >1/3 of colon involved with colitis and >8-10 years of IBD duration  - colonoscopy:  every 1-2 years for surveillance once in endoscopic remission     Pap smear recommended yearly   Opthamologic exam recommended yearly    Dermatologic exam recommended yearly      Bone health:  Risk of osteopenia/osteoporosis:  Risks factors: none  Vitamin D:   Lab Results   Component Value Date    [id number] 25 (L) 01/30/2017     - currently taking Vitamin D3 5000 IU daily     Vaccine counseling:  - No LIVE VACCINES  - not immune to chickenpox- household should be vaccinated  - Tetanus every 10 years  - pneumonia vaccine recommended Vitamin D:    Follow up: same day as colonoscopy- pt to inform our office once she scheduled colonoscopy so we can coordinate appt    Total visit time was 40 minutes, more than 50% of which was spent in face-to-face counseling with patient regarding evaluation and management goals and treatment options for CD    Diamond Barbour MD  Department of Gastroenterology  Medical Director, Inflammatory Bowel Disease

## 2017-06-15 NOTE — PATIENT INSTRUCTIONS
Instructions:  - labs today  - miralax daily and you can take twice a day on the days you are constipated  - okay to do dulcolax as a quick fix  - proceed with [facility] remicade levels on July 6th- give patient lab slip for this  - call and schedule colonoscopy preferably by August- Tues or Thursday morning  - Once you have scheduled your colonoscopy send me an email so we can get you scheduled for same day appt with me or Kimberly  - if remicade higher dose not effective next steps would be entyvio vs stelara     Vedolizumab (Entyvio): Biologics - Anti- integrin  - Mechanism of action:  Entyvio blocks alpha-4-beta-7 which plays a role in the inflammatory process for inflammatory bowel disease    Entyvio Dosage:  - Loading Dose: 300 mg IV on week 0, 2, and 6  - Maintenance Dose: 300 mg IV every 8 weeks  - 30 minute infusion     Common Adverse Reactions of Entyvio  Adverse reactions in >3% of Entyvio-treated patients and >1% higher than Placebo   Entyvio (V=0778)   Placebo (N=297)   Nasopharyngitis 13% - 13/100 7% - 7/100   Headache 12% - 12/100 11% - 11/100   Joint pain 12% - 12/100 10% - 10/100   Nausea 9% - 9/100 8% - 8/100   Fever 9% - 9/100 7% - 7/100   Upper Respiratory Tract Infection 7% - 7/100 6% - 6/100   Fatigue 6% - 6/100 3% - 3/100   Cough 5% - 5/100 3% - 3/100   Bronchitis 4% - 4/100 3% - 3/100   Influenza 4% - 4/100 2% - 2/100   Back Pain 4% - 4/100 3% - 3/100   Rash 3% - 3/100 2% - 2/100   Itching 3% - 3/100 1% - 1/100   Sinusitis 3% - 3/100 1% - 1/100   Oropharyngeal Pain 3% - 3/100 1% - 1/100   Pain in Extremities 3% - 3/100 1% - 1/100     **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  Entyvio is an uviym7hgwk2 whereas Tysabri is an citlv3ucxe0 integrin antagonist.  There are no reports of PML identified with Entyvio use thus far.    Ustekinumab (Stelara):  Zvzbsjqde-Cyaowqikwv-26 & Interlukin-23 Inhibitor  - Mechanism of action:  stelara blocks the p40 protein subunit used by both the interleukin (IL)-12 and IL-23 cytokines. IL-12 and IL-23 are naturally occurring cytokines that are involved in inflammatory and immune responses    Stelara Dosage:  Initial infusion of 390 mg IV over 1 hour then 90 mg SubQ every 8 weeks (first injection dose is 8 weeks after initial infusion)    Risks of Stelara discussed today:    Injection Reactions Placebo 90 mg   Nasopharyngitis 8% (8/100) 11% (11/100)   Injection Site Erythema 0 5% (5/100)   Vulvovaginal candidiasis/mycotic infection 1% (1/100) 5% (5/100)   Fatigue 2% (2/100) 3% (3/100)   Bronchitis 3% (3/100) 5% (5/100)   Pruritis 2% (2/100) 4% (4/100)   Urinary Tract Infection 2% (2/100) 4% (4/100)   Sinusitis 2% (2/100) 3% (3/100)        Infusion Reactions     Vomiting 3% (3/100) 4% (4/100)     Reversible posterior leukoencephalopathy syndrome (RPLS)  RPLS is a rare condition that affects the brain and can cause death. The cause of RPLS is not known. If RPLS is found early and treated, most people recover. Tell your doctor right away if you have any new or worsening medical problems including: headache, seizures, confusion, and vision problems

## 2017-06-28 DIAGNOSIS — K50.119 CROHN'S COLITIS, UNSPECIFIED COMPLICATION: Primary | ICD-10-CM

## 2017-06-28 DIAGNOSIS — Z12.11 SPECIAL SCREENING FOR MALIGNANT NEOPLASMS, COLON: Primary | ICD-10-CM

## 2017-06-28 RX ORDER — POLYETHYLENE GLYCOL 3350, SODIUM SULFATE ANHYDROUS, SODIUM BICARBONATE, SODIUM CHLORIDE, POTASSIUM CHLORIDE 236; 22.74; 6.74; 5.86; 2.97 G/4L; G/4L; G/4L; G/4L; G/4L
4 POWDER, FOR SOLUTION ORAL ONCE
Qty: 4000 ML | Refills: 0 | Status: SHIPPED | OUTPATIENT
Start: 2017-06-28 | End: 2017-06-28

## 2017-08-18 ENCOUNTER — TELEPHONE (OUTPATIENT)
Dept: GASTROENTEROLOGY | Facility: CLINIC | Age: 57
End: 2017-08-18

## 2017-08-18 NOTE — TELEPHONE ENCOUNTER
"Called and spoke with Jenifer. She stated that pt was scheduled for her Remicade infusion this afternoon at 1 and she needed us to take care of the Re Pre Cert. I explained we only do Pre Cert for Facilities that generally the Facility the pt is going to takes care of that.  She stated they do not do pre certs. I told her I would have to check on this and see what needs to be done. It is 11:15 on a Friday. I asked how soon once pre cert is done will it take to get her rescheduled. She stated once they have the pre cert they can get her in immediately.    I checked with Geoff MONTES in pre cert and after some digging she sent me a message stating  "I opened the referral she has in the system. Let me get started" after further discussion at the moment there is nothing further for us to do on our end.          "

## 2017-08-18 NOTE — TELEPHONE ENCOUNTER
----- Message from Kyung Kiser sent at 8/18/2017 11:15 AM CDT -----  Contact: [facility] - [phone]  lucas - was pre-cert obtained for remicade - needs it for this afternoon - plesae call [facility] - [phone]

## 2017-08-21 ENCOUNTER — TELEPHONE (OUTPATIENT)
Dept: GASTROENTEROLOGY | Facility: CLINIC | Age: 57
End: 2017-08-21

## 2017-08-21 NOTE — TELEPHONE ENCOUNTER
Called Leo and spoke with Nidhi at [phone]  She asked if it would be ok if they researched other infusion facilities closer to pts home to make it more cost effective for the pt. Once they find some they contact the pt and get it scheduled.   I explained that would be fine as long as it is not home infusions.  Dr Barbour will not authorize home infusions. I explained that we use [facility] as well as the hospital infusion center. She stated she will put [facility] on the list of options to check. I explained once pt is scheduled to please let us know so we can make sure the facility has all the info they need for pt to get infusion.  She stated the [facility] Care Options Team at [phone] takes care of everything so is very little we have to do on our end.  I asked her to hold for me while I checked pts chart because she asked when the last infusion was and while we were talking I explained that she may not be staying on Remicade. She stated what she will do is approve it for a mth that way if we keep her on it there will not be a lapse.  [facility] way everything will be in place.

## 2017-08-21 NOTE — TELEPHONE ENCOUNTER
----- Message from Kyung Margi sent at 8/21/2017  9:27 AM CDT -----  Contact: [facility]   [place] - has questions re her remicade - please call [facility] -

## 2017-08-22 NOTE — TELEPHONE ENCOUNTER
Geoff Cook contacted me letting me know that pts infusion was only approved for a 1 time infusion.  This is because when I spoke with them yesterday Nidhi said she will go ahead and approve a 1 time infusion while we try and get her at an outside facility.

## 2017-08-23 NOTE — TELEPHONE ENCOUNTER
Called and spoke with pt  I asked if she went and got her Remicade levels in July.  She stated yes she did. [facility] [place] [place]. [phone]  ACT # [id number]  REQ # [id number]   I googled the phone number and the office is closed so I told pt I would call tomorrow to get the results.    I then asked when her last infusion was and she said 2017.  Her next was due on 2017 which she was unable to get due to the orders being  and having to do another Auth.  I took care of that and her infusion is approved for 1 time hopital infusion. I told her (per Dr Barbour) to go ahead and schedule her infusion for the wk of  and when she comes on  it will be discussed at that time if she will continue with Remicade. If so she will already be scheduled.  She expressed understanding.  I explained the situation with the insurance company checking on outside facilities for her to go too but I gave her the [facility] Team number [phone] and asked her to reach out to check the status and see if her current place could be added to list of approved facilities.

## 2017-08-23 NOTE — TELEPHONE ENCOUNTER
Please call patient and ask if she had her remicade levels and abs done on 7/6 at [facility] please. If she had them done please get results for me and Kimberly to review. She is scheduled for colonoscopy and appt on 8/29.      Also find out when her last remicade infusion was and when her next one is please.

## 2017-08-24 LAB
INFLIXIMAB AB SERPL-MCNC: <22 NG/ML
INFLIXIMAB SERPL-MCNC: 3 UG/ML

## 2017-08-24 NOTE — TELEPHONE ENCOUNTER
Called [facility] Results line at [phone]  Spoke with Kelle. I asked what fax number they had on file with us and it was not the correct fax.  She updated the fax number [phone] and stated that she would send over pts results from 07/06/217.

## 2017-08-29 ENCOUNTER — ANESTHESIA EVENT (OUTPATIENT)
Dept: ENDOSCOPY | Facility: HOSPITAL | Age: 57
End: 2017-08-29
Payer: COMMERCIAL

## 2017-08-29 ENCOUNTER — HOSPITAL ENCOUNTER (OUTPATIENT)
Facility: HOSPITAL | Age: 57
Discharge: HOME | End: 2017-08-29
Attending: INTERNAL MEDICINE | Admitting: INTERNAL MEDICINE
Payer: COMMERCIAL

## 2017-08-29 ENCOUNTER — ANESTHESIA (OUTPATIENT)
Dept: ENDOSCOPY | Facility: HOSPITAL | Age: 57
End: 2017-08-29
Payer: COMMERCIAL

## 2017-08-29 ENCOUNTER — SURGERY (OUTPATIENT)
Age: 57
End: 2017-08-29

## 2017-08-29 ENCOUNTER — OFFICE VISIT (OUTPATIENT)
Dept: GASTROENTEROLOGY | Facility: CLINIC | Age: 57
End: 2017-08-29
Payer: COMMERCIAL

## 2017-08-29 ENCOUNTER — TELEPHONE (OUTPATIENT)
Dept: GASTROENTEROLOGY | Facility: CLINIC | Age: 57
End: 2017-08-29

## 2017-08-29 VITALS
DIASTOLIC BLOOD PRESSURE: 78 MMHG | RESPIRATION RATE: 16 BRPM | HEART RATE: 98 BPM | SYSTOLIC BLOOD PRESSURE: 128 MMHG | WEIGHT: 149.25 LBS | HEIGHT: 64 IN | BODY MASS INDEX: 25.48 KG/M2 | TEMPERATURE: 99 F

## 2017-08-29 VITALS
BODY MASS INDEX: 25.27 KG/M2 | OXYGEN SATURATION: 98 % | DIASTOLIC BLOOD PRESSURE: 76 MMHG | TEMPERATURE: 99 F | HEIGHT: 64 IN | SYSTOLIC BLOOD PRESSURE: 166 MMHG | RESPIRATION RATE: 11 BRPM | HEART RATE: 81 BPM | WEIGHT: 148 LBS | RESPIRATION RATE: 16 BRPM

## 2017-08-29 DIAGNOSIS — C50.919 MALIGNANT NEOPLASM OF FEMALE BREAST, UNSPECIFIED ESTROGEN RECEPTOR STATUS, UNSPECIFIED LATERALITY, UNSPECIFIED SITE OF BREAST: ICD-10-CM

## 2017-08-29 DIAGNOSIS — K50.812 CROHN'S DISEASE OF BOTH SMALL AND LARGE INTESTINE WITH INTESTINAL OBSTRUCTION: Primary | ICD-10-CM

## 2017-08-29 DIAGNOSIS — K50.019 CROHN'S DISEASE OF ILEUM, UNSPECIFIED COMPLICATION: ICD-10-CM

## 2017-08-29 DIAGNOSIS — Z79.899 HIGH RISK MEDICATION USE: ICD-10-CM

## 2017-08-29 DIAGNOSIS — K50.018 CROHN'S DISEASE OF ILEUM, OTHER COMPLICATION: ICD-10-CM

## 2017-08-29 DIAGNOSIS — E55.9 VITAMIN D DEFICIENCY: ICD-10-CM

## 2017-08-29 PROBLEM — K50.00 CROHN'S DISEASE OF ILEUM: Status: ACTIVE | Noted: 2017-08-29

## 2017-08-29 PROCEDURE — 63600175 PHARM REV CODE 636 W HCPCS: Performed by: NURSE ANESTHETIST, CERTIFIED REGISTERED

## 2017-08-29 PROCEDURE — 99999 PR PBB SHADOW E&M-EST. PATIENT-LVL V: CPT | Mod: PBBFAC,,, | Performed by: NURSE PRACTITIONER

## 2017-08-29 PROCEDURE — 37000008 HC ANESTHESIA 1ST 15 MINUTES: Performed by: INTERNAL MEDICINE

## 2017-08-29 PROCEDURE — 45380 COLONOSCOPY AND BIOPSY: CPT | Mod: ,,, | Performed by: INTERNAL MEDICINE

## 2017-08-29 PROCEDURE — 27201012 HC FORCEPS, HOT/COLD, DISP: Performed by: INTERNAL MEDICINE

## 2017-08-29 PROCEDURE — D9220A PRA ANESTHESIA: Mod: ANES,,, | Performed by: ANESTHESIOLOGY

## 2017-08-29 PROCEDURE — 45380 COLONOSCOPY AND BIOPSY: CPT | Performed by: INTERNAL MEDICINE

## 2017-08-29 PROCEDURE — 99215 OFFICE O/P EST HI 40 MIN: CPT | Mod: 25,S$GLB,, | Performed by: NURSE PRACTITIONER

## 2017-08-29 PROCEDURE — 88305 TISSUE EXAM BY PATHOLOGIST: CPT | Performed by: PATHOLOGY

## 2017-08-29 PROCEDURE — 37000009 HC ANESTHESIA EA ADD 15 MINS: Performed by: INTERNAL MEDICINE

## 2017-08-29 PROCEDURE — 3008F BODY MASS INDEX DOCD: CPT | Mod: S$GLB,,, | Performed by: NURSE PRACTITIONER

## 2017-08-29 PROCEDURE — 25000003 PHARM REV CODE 250: Performed by: INTERNAL MEDICINE

## 2017-08-29 PROCEDURE — 88342 IMHCHEM/IMCYTCHM 1ST ANTB: CPT | Mod: 26,,, | Performed by: PATHOLOGY

## 2017-08-29 PROCEDURE — D9220A PRA ANESTHESIA: Mod: CRNA,,, | Performed by: NURSE ANESTHETIST, CERTIFIED REGISTERED

## 2017-08-29 RX ORDER — SODIUM CHLORIDE 9 MG/ML
INJECTION, SOLUTION INTRAVENOUS CONTINUOUS
Status: DISCONTINUED | OUTPATIENT
Start: 2017-08-29 | End: 2017-08-29 | Stop reason: HOSPADM

## 2017-08-29 RX ORDER — PROPOFOL 10 MG/ML
VIAL (ML) INTRAVENOUS CONTINUOUS PRN
Status: DISCONTINUED | OUTPATIENT
Start: 2017-08-29 | End: 2017-08-29

## 2017-08-29 RX ORDER — PROPOFOL 10 MG/ML
VIAL (ML) INTRAVENOUS
Status: DISCONTINUED | OUTPATIENT
Start: 2017-08-29 | End: 2017-08-29

## 2017-08-29 RX ORDER — FENTANYL CITRATE 50 UG/ML
INJECTION, SOLUTION INTRAMUSCULAR; INTRAVENOUS
Status: DISCONTINUED | OUTPATIENT
Start: 2017-08-29 | End: 2017-08-29

## 2017-08-29 RX ORDER — LIDOCAINE HCL/PF 100 MG/5ML
SYRINGE (ML) INTRAVENOUS
Status: DISCONTINUED | OUTPATIENT
Start: 2017-08-29 | End: 2017-08-29

## 2017-08-29 RX ADMIN — SODIUM CHLORIDE: 0.9 INJECTION, SOLUTION INTRAVENOUS at 09:08

## 2017-08-29 RX ADMIN — FENTANYL CITRATE 50 MCG: 50 INJECTION, SOLUTION INTRAMUSCULAR; INTRAVENOUS at 09:08

## 2017-08-29 RX ADMIN — FENTANYL CITRATE 50 MCG: 50 INJECTION, SOLUTION INTRAMUSCULAR; INTRAVENOUS at 10:08

## 2017-08-29 RX ADMIN — PROPOFOL 40 MG: 10 INJECTION, EMULSION INTRAVENOUS at 09:08

## 2017-08-29 RX ADMIN — PROPOFOL 100 MG: 10 INJECTION, EMULSION INTRAVENOUS at 09:08

## 2017-08-29 RX ADMIN — LIDOCAINE HYDROCHLORIDE 80 MG: 20 INJECTION, SOLUTION INTRAVENOUS at 09:08

## 2017-08-29 RX ADMIN — PROPOFOL 165 MCG/KG/MIN: 10 INJECTION, EMULSION INTRAVENOUS at 09:08

## 2017-08-29 NOTE — TELEPHONE ENCOUNTER
Outside Labs: [facility]    Date of labs:  07/06/2017    Results:    INFLIXIMAB (IFX) CONC+ IFX AB    Infliximab Drug Level   3.0    Quantitation Limit:   <0.4  Results of 0.4 or higher indicate detection os Infliximab    ANTI-INFLIXIMAB ANTIBODY  <22    Quantitation Limit: <22  Results of 22 or higher indicate detection of anti-infliximab antibodies    22 - 200 ng/ml:  LOW antibody titer, little/no apparent reduction in free drug level.    201 - 1,000 ng/ml:  INTERMEDIATE antibody titer, variable reduction in free drug level.    1,001 or greater ng/ml:  HIGH antibody titer, notably diminished or absent free drug level.          IBD MEDICATIONS CURRENTLY ON AND CURRENT DOSAGE (including Prednisone):    IF CURRENTLY ON A BIOLOGIC, LAST DOSE AND NEXT SCHEDULED DOSE:  Remicade  Supposed to be scheduled for this wk      NEXT SCHEDULED APPOINTMENT:  Today

## 2017-08-29 NOTE — ANESTHESIA POSTPROCEDURE EVALUATION
"Anesthesia Post Evaluation    Patient: Aline Fuller    Procedure(s) Performed: Procedure(s) (LRB):  COLONOSCOPY (N/A)    Final Anesthesia Type: general  Patient location during evaluation: GI PACU  Patient participation: Yes- Able to Participate  Level of consciousness: awake and alert  Post-procedure vital signs: reviewed and stable  Pain management: adequate  Airway patency: patent  PONV status at discharge: No PONV  Anesthetic complications: no      Cardiovascular status: blood pressure returned to baseline  Respiratory status: unassisted, spontaneous ventilation and room air  Hydration status: euvolemic  Follow-up not needed.        Visit Vitals  BP (!) 166/76 (Patient Position: Sitting)   Pulse 81   Temp 36.9 °C (98.5 °F)   Resp 16   Ht 5' 4" (1.626 m)   Wt 67.1 kg (148 lb)   SpO2 98%   Breastfeeding? No   BMI 25.40 kg/m²       Pain/Jil Score: Pain Assessment Performed: Yes (8/29/2017 11:42 AM)  Presence of Pain: denies (8/29/2017 11:42 AM)  Jil Score: 10 (8/29/2017 11:42 AM)      "

## 2017-08-29 NOTE — TRANSFER OF CARE
"Anesthesia Transfer of Care Note    Patient: Aline Fuller    Procedure(s) Performed: Procedure(s) (LRB):  COLONOSCOPY (N/A)    Patient location: PACU    Anesthesia Type: general    Transport from OR: Transported from OR on room air with adequate spontaneous ventilation    Post pain: adequate analgesia    Post assessment: no apparent anesthetic complications and tolerated procedure well    Post vital signs: stable    Level of consciousness: awake, oriented and alert    Nausea/Vomiting: no nausea/vomiting    Complications: none    Transfer of care protocol was followed      Last vitals: 08/29/17 1116  Visit Vitals  /74   Pulse 86   Temp 98.3   Resp 20   Ht 5' 4" (1.626 m)   Wt 67.1 kg (148 lb)   SpO2 100%   Breastfeeding? No   BMI 25.40 kg/m²     "

## 2017-08-29 NOTE — ANESTHESIA PREPROCEDURE EVALUATION
08/29/2017  Aline Fuller is a 57 y.o., female.    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    I have reviewed the Nursing Notes.      Review of Systems  Hematology/Oncology:         -- Anemia:   Cardiovascular:   Exercise tolerance: good Denies Hypertension.   Denies Angina.        Pulmonary:   Denies COPD.  Denies Asthma.  Denies Shortness of breath.  Denies Sleep Apnea.    Renal/:   Denies Chronic Renal Disease.     Hepatic/GI:   Denies Liver Disease. Dysphagia   Neurological:   Denies CVA. Denies Seizures.    Endocrine:   Denies Diabetes. Denies Hypothyroidism.        Physical Exam  General:  Well nourished    Airway/Jaw/Neck:  Airway Findings: Mallampati: II TM Distance: Normal, at least 6 cm  Jaw/Neck Findings:  Neck ROM: Normal ROM       Chest/Lungs:  Chest/Lungs Findings: Normal Respiratory Rate     Heart/Vascular:  Heart Findings: Rate: Normal        Mental Status:  Mental Status Findings:  Cooperative         Anesthesia Plan  Type of Anesthesia, risks & benefits discussed:  Anesthesia Type:  general  Patient's Preference: GA  Intra-op Monitoring Plan: standard ASA monitors  Intra-op Monitoring Plan Comments:   Post Op Pain Control Plan:   Post Op Pain  Control Plan Comments: Opioids PRN  Induction:    Beta Blocker:  Patient is not currently on a Beta-Blocker (No further documentation required).       Informed Consent: Patient understands risks and agrees with Anesthesia plan.  Questions answered. Anesthesia consent signed with patient.  ASA Score: 2     Day of Surgery Review of History & Physical:        Anesthesia Plan Notes: I personally saw the patient and a history and physical was performed.    Plan for GA - TIVA        Ready For Surgery From Anesthesia Perspective.

## 2017-08-29 NOTE — PATIENT INSTRUCTIONS
Discharge Summary/Instructions after an Endoscopic Procedure  Patient Name: Aline Fuller  Patient MRN: [id number]  Patient YOB: 1960 Tuesday, August 29, 2017  Diamond Barbour MD  RESTRICTIONS:  During your procedure today, you received medications for sedation.  These   medications may affect your judgment, balance and coordination.  Therefore,   for 24 hours, you have the following restrictions:   - DO NOT drive a car, operate machinery, make legal/financial decisions,   sign important papers or drink alcohol.    ACTIVITY:  The following day: return to full activity including work, except no heavy   lifting, straining or running for 3 days if polyps were removed.  DIET:  Eat and drink normally unless instructed otherwise.  TREATMENT FOR COMMON SIDE EFFECTS:  - Mild abdominal pain, belching, bloating or excessive gas: rest, eat   lightly and use a heating pad.  - Sore Throat: treat with throat lozenges and/or gargle with warm salt   water.  SYMPTOMS TO WATCH FOR AND REPORT TO YOUR PHYSICIAN:  1. Abdominal pain or bloating, other than gas cramps.  2. Chest pain.  3. Back pain.  4. Chills or fever occurring within 24 hours after the procedure.  5. Rectal bleeding, which would show as bright red, maroon, or black stools.   (A tablespoon of blood from the rectum is not serious, especially if   hemorrhoids are present.)  6. Vomiting.  7. Weakness or dizziness.  8. Because air was used during the procedure, expelling large amounts of air   from your rectum or belching is normal.  9. If a bowel prep was taken, you may not have a bowel movement for 1-3   days.  This is normal.  GO DIRECTLY TO THE EMERGENCY ROOM IF YOU HAVE ANY OF THE FOLLOWING:   Difficulty breathing  Chills and/or fever over 101 F   Persistent vomiting and/or vomiting blood   Severe abdominal pain   Severe chest pain   Black, tarry stools   Bleeding- more than one tablespoon  Your doctor recommends these additional instructions:  If any  biopsies were taken, your doctorâs clinic will call you in 1 to 2   weeks with any results.  You are being discharged to home.   You have a contact number available for emergencies.  The signs and symptoms   of potential delayed complications were discussed with you.  You may return   to normal activities tomorrow.  Written discharge instructions were   provided to you.   Resume your previous diet.   Continue your present medications.   We are waiting for your pathology results.   A repeat colonoscopy is not recommended.   Return to your GI clinic as previously scheduled.  For questions, problems or results please call your physician - Diamond Barbour MD at Work:  [phone].  [facility], EMERGENCY ROOM PHONE NUMBER: [phone]  IF A COMPLICATION OR EMERGENCY SITUATION ARISES AND YOU ARE UNABLE TO REACH   YOUR PHYSICIAN - GO DIRECTLY TO THE EMERGENCY ROOM.  Diamond Barbour MD  8/29/2017 11:13:40 AM  This report has been verified and signed electronically.

## 2017-08-29 NOTE — PROGRESS NOTES
[facility]             Inflammatory Bowel Disease Follow-up  Note            Diamond Barbour MD & LORENZO Canseco  TODAY'S VISIT DATE:  8/29/2017    Chief Complaint:   Chief Complaint   Patient presents with    Crohn's Disease     PCP: Jamie Polo    Previous History:  Aline Fuller is a 57 y.o. female with fistulizing ileocolonic Crohn's disease with symptom onset in 1972 and diagnosis in 1979 and multiple surgeries including: Ex lap with 6 inches of colon removed (1973), Ex lap with 6 inches of colon removed (1979), and Ex lap with 6 inches of colon removed (1997).  She has a pertinent past medical history of breast cancer.  She initially started with symptoms of abdominal pain, weight loss, and diarrhea in 1972 and had an ex lap in 1973 with 6 inches of colon removed that was non-diagnostic for CD.  She was not diagnosed until 1979 after her second ex lap with 6 inches of small bowel removed.  She was initially treated with several courses of prednisone.  Early in her diagnosis, she took sulfasalazine though it was discontinued due to a rash, dipentum though it was not effective long term, and imuran though it had to be discontinued due to bone marrow suppression.  Her symptoms progressed which resulted in an SBO that prompted her third ex lap in 1997 that resulted in 6 inches of small bowel being ressected.  She was in clinic remission and on no medications from 1997 to 2001 at which time she recalls having a fistula and and abscess that required I&D and ABX.  She had recurrence of fistula and abscess in 2004 or 2005.  She also took asacol, colazal, 6MP, and imuran that were all ineffective though possible anemia with 6MP/Imuran.  In 2008 she started humira with induction and maintenance but discontinued after 3 months because patient broke out with a rash though she also felt it was ineffective.   She established care with Dr. Polo in 2010 and had a colonoscopy 10/2010 which  showed fistulas, anal stricture dilated, and active inflammation at the ileocolonic anastomosis that could not be traversed.  In 12/2010 she had perianal Crohn's disease that was treated with dilation of rectal stricture and seton placement.  She started remicade 2/2011.  MRI 4/2011 showed multiple perianal fistulas with seton removal in approximately 5/2011.  At some time in 2012, her remicade dose frequency was increased to 5 mg/kg to every 6 weeks.  She was diagnosed with stage 1 breast cancer 6/2014  that was treated with lumpectomy followed by chemo/XRT with no recurrence since then and remains on tamoxifen. Since 2012 she continues on remicade 5 mg/kg every 6 weeks though it was held during chemotherapy from 6/2014-4/2015 and restarted with induction and maintenance of 5 mg/kg every 6 weeks.   Her most recent colonoscopy 10/2016 showed continued anal stricture with inflammation throughout the colon and neoterminal ileum could not be intubated.  She had [facility] trough IFX levels and ABs in 2/2017 which showed undetectable levels with no ABs.  She had a CTE on 2/27/2017 that showed cctive CD involving the descending and sigmoid colon and rectum, decreased caliber at the ileocolic anastomosis with possible mucosal hyperenhancement, note is made of focal decreased caliber within the rectum, possibly transient, multiple perianal fistulas without evidence for abscess, and heterogeneous enhancement within the uterus-appearance is not certainly secondary to leiomyomas.  She continued with stable symptoms so we planned for a repeat colonoscopy.      Interval History:  - current IBD meds: Remicade 10 mg/kg every 6 weeks  (prescribed by Dr. Polo and pt has infusions in [place], [place]), last dose 7/7/2017, next dose 8/31/2017  -1 soft Bowel Movements/day  - 7/6/2017: [facility] IFX trough levels 3.0 and ABs < 22  - 8/29/2017 Colonoscopy: The perianal exam findings include large perianal skin tags with buttock scarring, severe  anal canal stenosis that Dr. Barbour was unable to do KARON and asked for Dr. Mcclure's assistance--Dr. Mcclure digitally dilated the patient's anal canal stenosis, there was bleeding after the KARON that stopped by end of the procedure; a localized area of ulcerated mucosa was found in the sigmoid colon, remainder of the colon is normal, a severe stenosis was found at the anastomosis with cratered   ulceration and was non-traversed  - perianal disease has improved  - constitutional/GI symptoms: no fevers/chills, weight loss, dysphagia, GERD, abdominal pain  - extraintestinal manifestations: no eye pain/redness, skin lesions/rashes, liver problems, joint pain/swelling/stiffness, dysuria/hematuria    Previous Surgeries:  1973 large bowel resection  1979 further colonic resection  12/17/2010: EUA, dilation of rectal stricture and placement of setons Xs 4    Pertinent Endoscopy/Imaging:  10/7/2010 Colonoscopy: perianal fistulas and skin tags; anal stricture which was dilated with the index finger; evidence of a prior end-to-side ileo-colonic anastomosis in the ascending colon--this was characterized by congestion, edema, erosion, erythema, and friable mucosa (path-TI--the changes appear mostly acute with erosion and increased neutrophils within the lamina propria, focally producing cryptitis; granulomata are not identified); the exam of the entire colon was otherwise without abnormality  4/18/2011 MRI abdomen & pelvis:  MULTIPLE COMPLEX-APPEARING PERIANAL FISTULAS WHICH CONNECT WITH THE GLUTEAL SURFACE BILATERALLY, AND ONE OF WHICH APPEARS TO CROSS MIDLINE CONNECTING THE RIGHT AND LEFT SIDE AT THE LEVEL OF THE ISCHIAL TUBEROSITY.  NO EVIDENCE OF DEFINITE FISTULOUS COMMUNICATION TO THE VAGINA OR BLADDER;    CIRCUMFERENTIAL AND ASYMMETRIC LEFT RECTAL THICKENING WITHOUT DISCRETE MASS IDENTIFIED.  THIS IS NONSPECIFIC AND COULD BE RELATED TO PRIOR SURGERY OR INFLAMMATORY OR INFECTIOUS PROCESS; FIBROID UTERUS  8/20/2012  Colonoscopy: skin tags; benign-appearing, intrinsic severe stenosis found at the anus that was traversed after dilation with the index finger; normal mucosa in the rectum, sigmoid, proximal & distal transverse colon, proximal & distal ascending colon (path-colonic mucosa with focal architectural distortion; no active inflammation or dysplasia identified)  5/23/2013 EGD: benign-appearing, intrinsic moderate stenosis was found at the GE junction; minimal mucosal tear with passage of the endoscope; entire examined stomach and duodenum was normal  4/25/2014 US: Normal  4/7/2015 Colonoscopy: skin tags; anal stricture dilated with index finger up to PIP; inflammation characterized by deep ulcerations was found as patches surrounded by normal mucosa in the ileocolonic anastomosis and rectum--moderate in severity and worsened when compared to previous findings (path-cecum--colonic mucosa with acute inflammation, ulceration and inflamed granulation tissue, no granulomas or dysplasia); the sigmoid, descending, splenic flexure, and ascending colons were spared  6/27/2016 CXR: normal  10/24/2016 EGD: severe benign-appearing, intrinsic stenosis was found at 35 cm from the incisors-dilation with a 10-11-12 mm pyloric balloon dilator was performed--reexamined and showed moderate improvement in luminal narrowing; entire examined stomach and duodenum was normal (no biopsies taken)  10/24/2016 Colonoscopy: anal stricture; inflammation characterized by serpentine ulcerations was found as medium-sized patches surrounded by normal mucosa in the rectum, as medium-sized patches surrounded by normal mucosa in the recto-sigmoid colon, as medium-sized patches surrounded by normal mucosa in the descending colon, as medium-sized patches surrounded by normal mucosa in the transverse colon, as medium-sized patches  surrounded by normal mucosa in the ascending colon and as  medium-sized patches surrounded by normal mucosa in the terminal  ileum.  No sites were spared. This was moderate in severity, and when compared to previous examinations, the findings are worsened. Neoterminal ileum could not be intubated. (no biopsies taken)  2/27/17 CTE: Active CD involving the descending and sigmoid colon and rectum.  Decreased caliber at the ileocolic anastomosis with possible mucosal hyperenhancement. Note is made of focal decreased caliber within the rectum, possibly transient.  Multiple perianal fistulas without evidence for abscess; heterogeneous enhancement within the uterus.  Appearance is not certainly secondary to leiomyomas.  Given postmenopausal age group, recommend further evaluation with pelvic ultrasound to assess endometrium  8/29/2017 Colonoscopy: The perianal exam findings include large perianal skin tags with buttock scarring, severe anal canal stenosis that Dr. Barbour was unable to do KARON and asked for Dr. Mcclure's assistance--Dr. Mcclure digitally dilated the patient's anal canal stenosis, there was bleeding after the KARON that stopped by end of the procedure; a localized area of ulcerated mucosa was found in the sigmoid colon, remainder of the colon is normal, a severe stenosis was found at the anastomosis with cratered   ulceration and was non-traversed    Pertinent Labs:  6/2016 TB quantiferon neg  1/30/17 CRP 3.2  1/30/17 TPMT normal  1/30/17 vit D 25  1/30/17 hepatitis B testing negative   1/30/17 VZV IgG negative  2/2017 [place] IFX level <1.0, Ab neg  No results found for: STOOLCULTURE, BSISIKUKAC3L, MYTUWNIBLD7Z, CDIFFICILEAN, CDIFFTOX, CDIFFICILEBY  Lab Results   Component Value Date    CRP 3.2 01/30/2017     Lab Results   Component Value Date    KLVAJAMX77IQ 25 (L) 01/30/2017     Lab Results   Component Value Date    HEPBCAB Negative 01/30/2017     Lab Results   Component Value Date    VARICELLAZOS 0.75 01/30/2017    VARICELLAINT Negative 01/30/2017     Lab Results   Component Value Date    NIL 0.019 06/15/2017    TBAG 0.013 06/15/2017     TBAGNIL -0.007 06/15/2017    MITOGENNIL >10.000 06/15/2017    TBGOLD Negative 06/15/2017     Lab Results   Component Value Date    TPMTRESULT 32.6 01/30/2017     Lab Results   Component Value Date    INFLIXIMAB <1.0 (L) 02/27/2017    INFLIXINTERP SEE BELOW 02/27/2017     Prior IBD Meds:  Prednisone  Sulfasalazine- rash  Dipentum- ineffective  6MP- bone marrow suppression  Flagyl/Cipro- effective   Asacol- ineffective  Colazal- ineffective  Imuran  Humira-rash- 2008 or 2009    Current IBD Meds:  Remicade 5 mg/kg every 6 weeks; Remicade [id number]--reinitiated 2016 with induction and then every 6 weeks maintenance      Vaccinations:  Flu shot: 2016  Chickenpox status/Varicella vaccine: never had chickenpox, VZV IgG negative  Lab Results   Component Value Date    VARICELLAZOS 0.75 01/30/2017    VARICELLAINT Negative 01/30/2017   Tetanus: not sure  Pneumonia 13 (PCV13) vaccine: had one but not sure when she has had one  Pneumonia 23 (PPSV23) vaccine: had one but not sure when she has had one  Hepatitis B: 2011  Lab Results   Component Value Date    HEPBSAB Negative 01/30/2017   Hepatitis A: 2011  HPV: NA   Meningococcal: NA     NSAID use/indication:  no    Narcotic use:  No    Alternative/Complementary Meds for IBD:  No    Review of Systems   Constitutional: Negative for chills, fever and weight loss.   HENT:        No oral ulcers, dysphagia, oral thrush   Eyes: Negative for blurred vision, pain and redness.   Respiratory: Negative for cough and shortness of breath.    Cardiovascular: Negative for chest pain.   Gastrointestinal: Negative for abdominal pain, heartburn, nausea and vomiting.   Genitourinary: Negative for dysuria and hematuria.   Musculoskeletal: Negative for back pain and joint pain.   Skin: Negative for rash.   Psychiatric/Behavioral: Negative for depression. The patient is not nervous/anxious and does not have insomnia.      All Medical History/Surgical History/Family History/Social History/Allergies have  "been reviewed and updated in EMR    Review of patient's allergies indicates:   Allergen Reactions    Sulfa (sulfonamide antibiotics)      Other reaction(s): Rash       Outpatient Prescriptions Marked as Taking for the 8/29/17 encounter (Office Visit) with CHEPE Jessica   Medication Sig Dispense Refill    bisacodyl (DULCOLAX) 5 mg EC tablet Take 5 mg by mouth daily as needed for Constipation.      cholecalciferol, vitamin D3, 5,000 unit capsule Take 2,000 Units by mouth. 1 Capsule Oral Every day      cyanocobalamin 1,000 mcg/mL injection 1,000 mcg every 30 days.   1    fluticasone (FLONASE) 50 mcg/actuation nasal spray 1 spray by Each Nare route daily as needed.   0    INFLIXIMAB (REMICADE IV) Inject 10 mg/kg into the vein every 6 weeks.       tamoxifen (NOLVADEX) 20 MG Tab Take 20 mg by mouth once daily.      triamcinolone acetonide 0.1% (KENALOG) 0.1 % cream daily as needed.        Current Facility-Administered Medications for the 8/29/17 encounter (Office Visit) with CHEPE Jessica   Medication Dose Route Frequency Provider Last Rate Last Dose    acetaminophen tablet 1,000 mg  1,000 mg Oral PRN Jamie Polo MD   1,000 mg at 04/25/14 1223    acetaminophen tablet 1,000 mg  1,000 mg Oral PRN Jamie Polo MD   1,000 mg at 07/11/14 1312    diphenhydrAMINE injection 25 mg  25 mg Intravenous PRN Jamie Polo MD   25 mg at 04/25/14 1223    diphenhydrAMINE injection 25 mg  25 mg Intravenous PRN Jamie Polo MD   25 mg at 07/11/14 1317     Vital Signs:  Vitals:    08/29/17 1258   BP: 128/78   Pulse: 98   Resp: 16   Temp: 98.9 °F (37.2 °C)   Weight: 67.7 kg (149 lb 4 oz)   Height: 5' 4" (1.626 m)   PainSc: 0-No pain     Physical Exam   Constitutional: She is oriented to person, place, and time. She appears well-developed.   HENT:   Mouth/Throat: Oropharynx is clear and moist. No oral lesions.   No oral ulcers or thrush   Eyes: Conjunctivae are normal. Pupils are equal, round, and " reactive to light.   Cardiovascular: Normal rate and regular rhythm.    Pulmonary/Chest: Effort normal and breath sounds normal.   Abdominal: Soft. There is no tenderness.   Musculoskeletal:        Right lower leg: She exhibits no swelling.        Left lower leg: She exhibits no swelling.   Neurological: She is alert and oriented to person, place, and time.   Skin: No rash noted.   Psychiatric: She has a normal mood and affect.   Nursing note and vitals reviewed.    Labs: Reviewed and pertinent noted above    Assessment/Plan:  Aline Fuller is a 57 y.o. female with longstanding fistulizing (perianal fistula/abscess) Crohn's disease with multiple surgeries including three partial colectomies (per patient 6 inches of her colon taken out each time) and also carries a history of breast cancer that is in remission. Patient understands risk of breast cancer recurrence on Remicade but given its been effective for her Crohn's she is willing to accept this risk.  She had trough [facility] IFX levels and ABs on 7/6/2017 that showed a level of 3 with no ABs.  Colonoscopy with Dr. Barbour on 8/29/2017 showed severe anal canal stenosis that was digitally dilated by Dr. Mcclure, ulceration in the sigmoid colon with remainder of colon normal, and a severe stenosis at the anastomosis with ulceration that was unable to be traversed. Patient continues on remicade 10 mg/kg every 6 weeks with stable GI symptoms with significant resolution of her perianal fistulas/abscesses.  She does have intermittent constipation which generally improves with anal stricture dilation which was done today.  She has isolated ulcer in the sigmoid colon with remainder of the colon normal. In regards to her small bowel her last CTE was 2/2017 which showed some mild mucosal hyperenhancement at that anastomosis though no stricture noted and no upstream SB dilation.  Since patient is stable and has had significant improvement in her perianal disease, we will not  make any changes to her current treatment plan given that the anastomotic stricture is not causing bowel obstruction or upstream dilation and we are worried that new treatment change may not keep her perianal disease under good control. She will f/u with Dr. Polo in 4 months and we recommend repeat imaging yearly with next due 2/2018. Ideally pt should have MRE done to minimize radiation.  Patient needs anal stricture dilation if symptoms of constipation return and I don't think we can wait until her yearly colonoscopy.  She was advised to call Dr. Mcclure's office if symptoms recur so he can do EUA with stricture dilation.  It can be dilated again at time of her routine yearly colonoscopy by Dr. Polo. If remicade ineffective we should consider stelara or entyvio especially due to her history of breast cancer although we are not sure if this will keep her perianal disease in remission.       # Fistulizing ileocolonic Crohn's disease with multiple surgeries including: Ex lap with 6 inches of colon removed (1973), Ex lap with 6 inches of colon removed (1979), and Ex lap with 6 inches of colon removed (1997) with worsening disease on colonoscopy and anastomotic stricture with no imaging or endoscopy of the small intestine above stricture  - continue remicade 10 mg/kg every 6 weeks due 8/30/2017  - drug monitoring labs: CBC, LFTs every 6 months, due 12/2017, 6/2017 TB quantiferon negative, due 6/2018; hepatitis B neg 2017  - OK to take miralax 1/2-1 capful daily  - repeat SB imaging yearly to assess bowel above stricture- next due 2/2018 and given this will be done frequently MRE is a better option  - recommend EUA with anal stricture dilation in 6 months with Dr. Mcclure and again at time of routine yearly colonoscopy though advised patient to call sooner if symptoms warrant  - repeat colonoscopy 8/2018    # History of Stage 1 Breast Cancer 6/2014    - treated with lumpectomy followed by chemo/XRT  (6/2014-4/2015)  - no recurrence since completion of treatment  - patient understands risk of breast cancer recurrence on Remicade but given its been effective for her Crohn's she is willing to accept this risk; will need to keep this in mind regarding her immunosuppression related to her Crohn's disease  - remains on tamoxifen with close follow-up    # IBD specific health maintenance- long term immunosuppression:  Colorectal cancer risk:    Risks factors: >1/3 of colon involved with colitis and >8-10 years of IBD duration  - colonoscopy:  every 1-2 years for surveillance once in endoscopic remission    Pap smear recommended yearly--due 9/2017  Opthamologic exam recommended yearly--due 2018    Dermatologic exam recommended yearly--due 2018    Bone health:  Risk of osteopenia/osteoporosis:  Risks factors: none  Vitamin D: currently taking Vitamin D3 5000 IU daily  Lab Results   Component Value Date    [id number] 25 (L) 01/30/2017     Vaccine counseling:  - No LIVE VACCINES--reminded in clinic  - not immune to chickenpox- household should be vaccinated  - flu shot due fall 2017  - pneumonia 13 & 23 vaccine recommended     Follow up: Dr. Polo in 4 months    CHEPE Jessica-C  Department of Gastroenterology  Inflammatory Bowel Disease Program    I have personally performed a face to face diagnostic evaluation on this patient. I have reviewed and agree with today's findings and the care plan outlined by JAQUELINE Canseco MD   Department of Gastroenterology  Medical Director, Inflammatory Bowel Disease

## 2017-08-29 NOTE — PATIENT INSTRUCTIONS
Instructions:  - continue remicade 10 mg/kg every 6 weeks, due 8/30/2017--call us if you need any further assistance   - repeat CTE in 2/2018  - recommend yearly EUA with anal stricture dilation in 6 months with Dr. Mcclure or sooner if constipation symptoms worsen  - repeat colonoscopy 8/2018  - repeat [facility] Remicade Levels and ABs in 7/2018  - OK to take 1/2-1 capful miralax daily  - Avoid all NSAIDs (Advil, Ibuprofen, Motrin, Aspirin, Naprosyn, Aleve)  - Yearly Pap Smears--due 9/2018  - Yearly Eye exam--due 2018  - Yearly Skin exam--wear sun block and hats--due 2018  - Use antibiotics with caution  - Vaccines needed:  - not immune to chickenpox- household should be vaccinated  - flu shot due fall 2017  - pneumonia 13 & 23 vaccine recommended   - Follow up with Dr. Polo in 4 months 12/2017, with Dr. Barbour as needed

## 2017-08-29 NOTE — H&P
Short Stay Endoscopy History and Physical    PCP - Jamie Polo MD  Referring Physician - Diamond Barbour MD  [address] [place] [address]    Procedure - Colonoscopy  ASA - per anesthesia  Mallampati - per anesthesia  History of Anesthesia problems - no  Family history Anesthesia problems -  no   Plan of anesthesia - General    HPI  57 y.o. female  Reason for procedure: Crohn's disease follow up        ROS:  Constitutional: No fevers, chills, No weight loss  CV: No chest pain  Pulm: No cough, No shortness of breath  GI: see HPI    Medical History:  has a past medical history of Allergic rhinitis; Anemia; Arthritis in Crohn's disease; Breast cancer (dxed 4/2014); Colon polyp; Crohn's disease; Dysphagia, pharyngoesophageal phase; Ocular hypertension; Personal history of colonic polyps; and Vitamin B deficiency.    Surgical History:  has a past surgical history that includes Colonoscopy; Esophagogastroduodenoscopy; colon resections; Appendectomy; Knee surgery (left); Breast lumpectomy (Right); fistula repairs; Colonoscopy (N/A, 10/24/2016); Upper gastrointestinal endoscopy; Cholecystectomy; Small intestine surgery; and Colon surgery (1972 or 1973, 1979).    Family History: family history includes Breast cancer in her sister; Colon cancer in her mother; Heart attack in her father; Heart disease in her brother and paternal uncle; Irritable bowel syndrome in her sister; Ovarian cancer in her maternal aunt; Pancreatic cancer in her maternal grandmother; Stomach cancer in her maternal uncle.. Otherwise no colon cancer, inflammatory bowel disease, or GI malignancies.    Social History:  reports that she has never smoked. She has never used smokeless tobacco. She reports that she does not drink alcohol or use drugs.    Review of patient's allergies indicates:   Allergen Reactions    Sulfa (sulfonamide antibiotics)      Other reaction(s): Rash       Medications:   Facility-Administered Medications Prior  to Admission   Medication Dose Route Frequency Provider Last Rate Last Dose    acetaminophen tablet 1,000 mg  1,000 mg Oral PRN Jamie oPlo MD   1,000 mg at 04/25/14 1223    acetaminophen tablet 1,000 mg  1,000 mg Oral PRN Jamie Polo MD   1,000 mg at 07/11/14 1312    diphenhydrAMINE injection 25 mg  25 mg Intravenous PRN Jamie Polo MD   25 mg at 04/25/14 1223    diphenhydrAMINE injection 25 mg  25 mg Intravenous PRN Jmaie Polo MD   25 mg at 07/11/14 1317     Prescriptions Prior to Admission   Medication Sig Dispense Refill Last Dose    bisacodyl (DULCOLAX) 5 mg EC tablet Take 5 mg by mouth daily as needed for Constipation.   8/28/2017 at Unknown time    cholecalciferol, vitamin D3, 5,000 unit capsule Take 2,000 Units by mouth. 1 Capsule Oral Every day   Past Week at Unknown time    cyanocobalamin 1,000 mcg/mL injection 1,000 mcg every 30 days.   1 Past Month at Unknown time    tamoxifen (NOLVADEX) 20 MG Tab Take 20 mg by mouth once daily.   Past Week at Unknown time    triamcinolone acetonide 0.1% (KENALOG) 0.1 % cream daily as needed.    Past Week at Unknown time    amlodipine (NORVASC) 2.5 MG tablet Take 2.5 mg by mouth once daily.   More than a month at Unknown time    fluticasone (FLONASE) 50 mcg/actuation nasal spray 1 spray by Each Nare route daily as needed.   0 Taking    INFLIXIMAB (REMICADE IV) Inject 5 mg/kg into the vein every 6 weeks.   More than a month at Unknown time    potassium chloride (KLOR-CON) 8 MEQ TbSR Take 8 mEq by mouth once daily.    More than a month at Unknown time       Physical Exam:    Vital Signs:   Vitals:    08/29/17 0927   BP: (!) 144/63   Pulse: 99   Resp: 16   Temp: 98.6 °F (37 °C)       General Appearance: Well appearing in no acute distress  Lungs: CTA anteriorly  Heart:  Regular rate, S1, S2 normal, no murmurs heard.  Abdomen: Soft, non tender, non distended with normal bowel sounds, no masses  Extremities: No edema    Labs:  Lab Results    Component Value Date    WBC 7.28 06/15/2017    HGB 10.6 (L) 06/15/2017    HCT 34.6 (L) 06/15/2017     06/15/2017    ALT 21 06/15/2017    AST 20 06/15/2017     01/30/2017    K 3.8 01/30/2017     01/30/2017    CREATININE 1.0 01/30/2017    BUN 21 (H) 01/30/2017    CO2 20 (L) 01/30/2017       I have explained the risks and benefits of this endoscopic procedure to the patient including but not limited to bleeding, inflammation, infection, perforation, and death.      Diamond Barbour MD

## 2017-08-30 ENCOUNTER — TELEPHONE (OUTPATIENT)
Dept: GASTROENTEROLOGY | Facility: CLINIC | Age: 57
End: 2017-08-30

## 2017-08-30 NOTE — TELEPHONE ENCOUNTER
Spoke with Gwendolyn and advised her that I don't remember the time and that I don't feel comfortable just putting anytime on the order(forging) . This order was since over in March 2017

## 2017-08-30 NOTE — TELEPHONE ENCOUNTER
----- Message from Rita Santos sent at 8/30/2017  2:37 PM CDT -----  [phone]  Linda Waldrop called and stated that the order that was faxed over for the pt needs a time next to the date- says it was faxed over on 3/20/17- their fax number is [phone]- please call Palma back at [phone]

## 2017-09-05 ENCOUNTER — TELEPHONE (OUTPATIENT)
Dept: ENDOSCOPY | Facility: HOSPITAL | Age: 57
End: 2017-09-05

## 2017-09-05 ENCOUNTER — PATIENT MESSAGE (OUTPATIENT)
Dept: GASTROENTEROLOGY | Facility: CLINIC | Age: 57
End: 2017-09-05

## 2017-09-11 ENCOUNTER — TELEPHONE (OUTPATIENT)
Dept: GASTROENTEROLOGY | Facility: CLINIC | Age: 57
End: 2017-09-11

## 2017-09-11 NOTE — TELEPHONE ENCOUNTER
----- Message from Rita Graham sent at 9/11/2017 11:55 AM CDT -----  Contact: Leo- [phone]  Christ- pharmacy called to get verbal authorization for the pts rx INFLIXIMAB (REMICADE IV)- please call the back at [phone]

## 2017-09-11 NOTE — TELEPHONE ENCOUNTER
Dr. Polo,  Patient would not be able to remicade treatment at the infusion center in [place] anymore due to patient benefits with [employer], Chandrana states patient can come here or go to amb infusion suite which is 9 miles form pt home . Leo was just giving a courtesy call

## 2017-09-13 ENCOUNTER — PATIENT MESSAGE (OUTPATIENT)
Dept: GASTROENTEROLOGY | Facility: CLINIC | Age: 57
End: 2017-09-13

## 2017-09-18 ENCOUNTER — TELEPHONE (OUTPATIENT)
Dept: GASTROENTEROLOGY | Facility: CLINIC | Age: 57
End: 2017-09-18

## 2017-09-18 NOTE — TELEPHONE ENCOUNTER
----- Message from Terrence oYussef sent at 9/18/2017  1:13 PM CDT -----  Contact: Pt:[phone]  Pt called and states she would like to speak with Dr.Thomas meraz in regards to pt remicade infusion.

## 2017-09-18 NOTE — TELEPHONE ENCOUNTER
,  Spoke with pt and she stated the infusion has been approved , Then I called [employer] and she stated that they need a New order from you regarding patient infusion.   Please adivse

## 2017-09-25 ENCOUNTER — TELEPHONE (OUTPATIENT)
Dept: GASTROENTEROLOGY | Facility: CLINIC | Age: 57
End: 2017-09-25

## 2017-09-25 NOTE — TELEPHONE ENCOUNTER
----- Message from Porfirio Kiser sent at 9/25/2017  1:47 PM CDT -----  Contact: porfirio/frederick [phone]  Christ - got request on her initial dose of remicade 5mg/kg - received it at hospital - home order is remicade 10mg/kg - they are verifying her dosage - please call porfirio/frederick [phone] ext [phone]

## 2017-10-02 ENCOUNTER — TELEPHONE (OUTPATIENT)
Dept: GASTROENTEROLOGY | Facility: CLINIC | Age: 57
End: 2017-10-02

## 2017-10-02 NOTE — TELEPHONE ENCOUNTER
----- Message from Jamie Polo MD sent at 10/2/2017  1:29 PM CDT -----  Contact:  [employer]ung [employer] nurse with [facility] [phone] ext [phone]  Please call nurse and let her know that the dose of Remicade is 10mg/kg iv q 6 weeks.    ----- Message -----  From: Sisi Hendrix MA  Sent: 10/2/2017   1:21 PM  To: Jamie Polo MD        ----- Message -----  From: Zahida Diez MA  Sent: 10/2/2017   1:12 PM  To: Christ Polo:  Needing to discuss pt's remicade dosage so they can work on getting approval.      [employer] [employer] nurse with [facility] [phone] ext [phone]

## 2017-10-02 NOTE — TELEPHONE ENCOUNTER
----- Message from Zahida Diez MA sent at 10/2/2017  1:12 PM CDT -----  Contact:  Kyung Fox nurse with [facility] [phone] ext [phone]  Christ:  Needing to discuss pt's remicade dosage so they can work on getting approval.      [employer]ung Fox nurse with [employer] [phone] ext [phone]

## 2017-11-30 ENCOUNTER — DOCUMENTATION ONLY (OUTPATIENT)
Dept: GASTROENTEROLOGY | Facility: CLINIC | Age: 57
End: 2017-11-30

## 2017-11-30 NOTE — PROGRESS NOTES
Labs: 11/24/2017    Total Protein: 8.1  Albumin: 4.0  T.Bili: 0.3  AST: 30  Alt: 42  Alk P: 101  WBC: 6.0  HB: 11.2  HCT: 36.1  PLT: 349  MCV: 92

## 2017-12-20 ENCOUNTER — OFFICE VISIT (OUTPATIENT)
Dept: GASTROENTEROLOGY | Facility: CLINIC | Age: 57
End: 2017-12-20
Payer: COMMERCIAL

## 2017-12-20 VITALS
BODY MASS INDEX: 27.52 KG/M2 | SYSTOLIC BLOOD PRESSURE: 132 MMHG | HEIGHT: 64 IN | WEIGHT: 161.19 LBS | HEART RATE: 101 BPM | DIASTOLIC BLOOD PRESSURE: 84 MMHG

## 2017-12-20 DIAGNOSIS — K50.813 CROHN'S DISEASE OF BOTH SMALL AND LARGE INTESTINE WITH FISTULA: Primary | ICD-10-CM

## 2017-12-20 PROCEDURE — 99214 OFFICE O/P EST MOD 30 MIN: CPT | Mod: S$GLB,,, | Performed by: INTERNAL MEDICINE

## 2017-12-20 PROCEDURE — 99999 PR PBB SHADOW E&M-EST. PATIENT-LVL III: CPT | Mod: PBBFAC,,, | Performed by: INTERNAL MEDICINE

## 2017-12-20 RX ORDER — DOXYCYCLINE 100 MG/1
CAPSULE ORAL
Refills: 0 | COMMUNITY
Start: 2017-10-11 | End: 2018-07-20

## 2017-12-20 RX ORDER — CLOTRIMAZOLE AND BETAMETHASONE DIPROPIONATE 10; .64 MG/G; MG/G
CREAM TOPICAL 2 TIMES DAILY
Refills: 0 | COMMUNITY
Start: 2017-09-28 | End: 2018-07-20

## 2017-12-20 RX ORDER — HYDROCODONE BITARTRATE AND ACETAMINOPHEN 7.5; 325 MG/1; MG/1
1 TABLET ORAL
Refills: 0 | COMMUNITY
Start: 2017-10-11 | End: 2018-07-20

## 2017-12-20 RX ORDER — NAPROXEN SODIUM 550 MG/1
TABLET ORAL
Refills: 0 | COMMUNITY
Start: 2017-10-11 | End: 2018-07-20

## 2017-12-20 RX ORDER — MISOPROSTOL 200 UG/1
TABLET ORAL
Refills: 0 | COMMUNITY
Start: 2017-10-02 | End: 2018-07-20

## 2017-12-20 RX ORDER — ERGOCALCIFEROL 1.25 MG/1
50000 CAPSULE ORAL
Refills: 0 | COMMUNITY
Start: 2017-09-28 | End: 2018-07-20

## 2017-12-20 NOTE — PROGRESS NOTES
REASON FOR VISIT:  Perianal Crohn disease.    HISTORY OF PRESENT ILLNESS:  Ms. Aline Fuller is a 57-year-old with   longstanding history of perianal Crohn's with ileocolic anastomotic stricture as   well and was recently transitioned from 5 mg/kg q.6 Remicade to 10 mg/kg q.6   Remicade based on infliximab titers and negative antibodies.  She has been doing   well with no major complaints except for issues with constipation, which she   has never had before.  She is currently managing it with Dulcolax, although she   feels like it is too much for her.  Today, we discussed about transitioning to   MiraLax every few days along with stool softener.  She could also use Dulcolax   as needed.  We discussed about anal dilation and the patient would prefer to get   it done with her next colonoscopy, which will be scheduled for August 2018.    Given that she has had a mild esophageal stricture that was dilated previously   in 2016, we will schedule an upper endoscopy along with her colonoscopy and we   will schedule both with Dr. Diamond Barbour (IBD expert).  Overall, she is doing   well except for chronic fatigue along with anemia of chronic disease.  She is   up-to-date on her influenza vaccine and pneumococcal vaccine.    PAST MEDICAL, SURGICAL, SOCIAL AND FAMILY HISTORY:  Reviewed.    MEDICATIONS AND ALLERGIES:  Reviewed.    REVIEW OF SYSTEMS:  CONSTITUTIONAL:  No fever, no chills.  Body weight is stable.  Appetite is   normal.  Chronic fatigue.  EYES:  No visual changes.  No red eyes.  ENT:  No odynophagia or hoarseness of voice.  CARDIOVASCULAR:  No angina or palpitations.  RESPIRATORY:  No shortness of breath or wheezing.  GASTROINTESTINAL:  See HPI.    PHYSICAL EXAMINATION:  VITAL SIGNS:  See EPIC.  GENERAL:  Awake, alert and oriented x3, no acute distress.    About 25 minutes spent with the patient, more than 50% in counseling.    IMPRESSION:  Chronic perianal fistualizing Crohn disease (currently inactive)   and  anal stenosis along with stenosis of the ileocolic anastomosis, currently   maintained on Remicade 10 mg/kg IV q.6.    RECOMMENDATION:  1.  We will schedule an MR enterography to compare to prior CT enterography to   be scheduled in June 2018.  2.  EGD, colonoscopy to be scheduled in August with Dr. Diamond Barbour.      ACT/HN  dd: 12/20/2017 12:07:53 (CST)  td: 12/21/2017 06:28:14 (CST)  Doc ID   #[id number]  Job ID #[id number]    CC:

## 2017-12-20 NOTE — PROGRESS NOTES
Per Dr. Polo ,  Patient needs a follow up appt with him in June , Patient is scheduled for MRI in June also appt needs to be sameday. Patient also need to set up colonoscopy and egd in August with dr Diamond Barbour

## 2018-02-21 ENCOUNTER — DOCUMENTATION ONLY (OUTPATIENT)
Dept: GASTROENTEROLOGY | Facility: CLINIC | Age: 58
End: 2018-02-21

## 2018-02-21 ENCOUNTER — TELEPHONE (OUTPATIENT)
Dept: GASTROENTEROLOGY | Facility: CLINIC | Age: 58
End: 2018-02-21

## 2018-02-21 NOTE — TELEPHONE ENCOUNTER
Labs: 2/16/2018    Hb: 10.3  Hct: 33.7  MCV: 92.3  Plt: 379  T. Protein: 7.5  Albumin: 3.8  AST: 31  ALT: 37  ALK P: 111  T.Bili: 0.2

## 2018-05-22 ENCOUNTER — TELEPHONE (OUTPATIENT)
Dept: GASTROENTEROLOGY | Facility: CLINIC | Age: 58
End: 2018-05-22

## 2018-05-22 NOTE — TELEPHONE ENCOUNTER
----- Message from Mimi Haq MA sent at 5/22/2018  1:11 PM CDT -----  Contact: self  [phone]  Patient Requesting Sooner Appointment.     Reason:   Recall letter and she is scheduled for a MRI on 6-19-18 so she is asking for 6-20-18 since she travels from far.  Name of Caller:  patient  When is the first available appointment?  8-3-18  Symptoms:  na  Communication Preference (MyChart response to Pt. (or) Call Back # and timeframe):  Phone# above  Additional Information:  States she is asking for consecutive days since she comes from far.

## 2018-06-25 ENCOUNTER — HOSPITAL ENCOUNTER (OUTPATIENT)
Dept: RADIOLOGY | Facility: HOSPITAL | Age: 58
Discharge: HOME | End: 2018-06-25
Attending: INTERNAL MEDICINE
Payer: COMMERCIAL

## 2018-06-25 DIAGNOSIS — K50.813 CROHN'S DISEASE OF BOTH SMALL AND LARGE INTESTINE WITH FISTULA: ICD-10-CM

## 2018-06-25 PROCEDURE — A9585 GADOBUTROL INJECTION: HCPCS | Performed by: INTERNAL MEDICINE

## 2018-06-25 PROCEDURE — 72197 MRI PELVIS W/O & W/DYE: CPT | Mod: 26,,, | Performed by: RADIOLOGY

## 2018-06-25 PROCEDURE — 25500020 PHARM REV CODE 255: Performed by: INTERNAL MEDICINE

## 2018-06-25 PROCEDURE — 74183 MRI ABD W/O CNTR FLWD CNTR: CPT | Mod: 26,,, | Performed by: RADIOLOGY

## 2018-06-25 PROCEDURE — 72197 MRI PELVIS W/O & W/DYE: CPT | Mod: TC

## 2018-06-25 RX ORDER — GADOBUTROL 604.72 MG/ML
10 INJECTION INTRAVENOUS
Status: COMPLETED | OUTPATIENT
Start: 2018-06-25 | End: 2018-06-25

## 2018-06-25 RX ADMIN — GADOBUTROL 10 ML: 604.72 INJECTION INTRAVENOUS at 12:06

## 2018-06-26 LAB
CREAT SERPL-MCNC: 1.1 MG/DL (ref 0.5–1.4)
SAMPLE: NORMAL

## 2018-07-17 NOTE — PROGRESS NOTES
MRI does not reveal any active inflammation. Continue current treatment and endoscopies with  in August.

## 2018-07-20 ENCOUNTER — HOSPITAL ENCOUNTER (OUTPATIENT)
Dept: RADIOLOGY | Facility: HOSPITAL | Age: 58
Discharge: HOME | End: 2018-07-20
Attending: INTERNAL MEDICINE
Payer: COMMERCIAL

## 2018-07-20 ENCOUNTER — OFFICE VISIT (OUTPATIENT)
Dept: GASTROENTEROLOGY | Facility: CLINIC | Age: 58
End: 2018-07-20
Payer: COMMERCIAL

## 2018-07-20 VITALS
WEIGHT: 167.75 LBS | DIASTOLIC BLOOD PRESSURE: 85 MMHG | BODY MASS INDEX: 28.64 KG/M2 | SYSTOLIC BLOOD PRESSURE: 138 MMHG | HEART RATE: 95 BPM | HEIGHT: 64 IN

## 2018-07-20 DIAGNOSIS — R05.9 COUGH: ICD-10-CM

## 2018-07-20 DIAGNOSIS — R13.10 DYSPHAGIA, UNSPECIFIED TYPE: Primary | ICD-10-CM

## 2018-07-20 DIAGNOSIS — K50.013 CROHN'S DISEASE OF SMALL INTESTINE WITH FISTULA: ICD-10-CM

## 2018-07-20 PROCEDURE — 71046 X-RAY EXAM CHEST 2 VIEWS: CPT | Mod: 26,,, | Performed by: RADIOLOGY

## 2018-07-20 PROCEDURE — 3008F BODY MASS INDEX DOCD: CPT | Mod: CPTII,S$GLB,, | Performed by: INTERNAL MEDICINE

## 2018-07-20 PROCEDURE — 99999 PR PBB SHADOW E&M-EST. PATIENT-LVL III: CPT | Mod: PBBFAC,,, | Performed by: INTERNAL MEDICINE

## 2018-07-20 PROCEDURE — 71046 X-RAY EXAM CHEST 2 VIEWS: CPT | Mod: TC,FY

## 2018-07-20 PROCEDURE — 99214 OFFICE O/P EST MOD 30 MIN: CPT | Mod: S$GLB,,, | Performed by: INTERNAL MEDICINE

## 2018-07-20 NOTE — PROGRESS NOTES
REASON FOR VISIT:  Fistulizing Crohn disease.    HISTORY OF PRESENT ILLNESS:  Ms. Fuller was last seen in December for a   longstanding history of perianal Crohn's disease.  She has been doing really   well clinically since she was transitioned from 5 mg/kg body weight every six to   10 mg/kg body weight every six weeks.  This titration was based on infliximab   titers and negative antibodies.  She has not been having any perianal fistula   drainage.  Her bowels have been moving better, although she was having some   constipation.  She has been using prunes and that has helped them more than   Dulcolax in the past.  She does have intermittent solid food dysphagia and has   previously undergone esophageal dilation in 2016.  Today, we discussed about   proceeding with a repeat upper endoscopy with dilation and colonoscopy for   surveillance.  She will be getting an anal dilation at the same time.  She does   have chronic fatigue and anemia of chronic disease with her hemoglobin being   stable.  She has discussed this with her primary oncologist who has been   following her for breast cancer, but he has discouraged her from using Aranesp   or Procrit.  She would like to get a second opinion with an [facility] hematologist   in the future.  She does have some mild night sweats, but no fevers or any   chronic cough, although for the past couple of days, she has been having some   coughing episodes with minimal expectoration.    PAST MEDICAL, SURGICAL, SOCIAL AND FAMILY HISTORY:  Reviewed.    MEDICATIONS AND ALLERGIES:  Reviewed.    REVIEW OF SYSTEMS:  CONSTITUTIONAL:  No fever, no chills.  Weight gain.  Appetite is normal.  EYES:  No visual changes, no red, painful eyes.  ENT:  No odynophagia or hoarseness of voice.  Does have ear fullness bilaterally   and is seeing a local ENT physician.  CARDIOVASCULAR:  No angina or palpitation.  RESPIRATORY:  No shortness of breath or wheezing.  GENITOURINARY:  No  dysuria.  MUSCULOSKELETAL:  No myalgias.  Some arthralgia around her knees and ankle.  SKIN:  No pruritus or eczema.  NEUROLOGIC:  No headache, no seizures.  PSYCHIATRIC:  No anxiety or depression.  GASTROINTESTINAL:  See HPI.  No blood in the stool.    PHYSICAL EXAMINATION:  VITAL SIGNS:  See EPIC.  GENERAL:  Awake, alert and oriented x3, no acute distress.  NECK:  Supple, no carotid bruit.  No cervical adenopathy.  ABDOMEN:  Obese, soft, nontender, nondistended.  No masses palpable.  No   hepatosplenomegaly appreciated.  Bowel sounds are normal.  No abdominal bruits   heard.  EYES:  Conjunctivae anicteric.  ENT:  Oropharynx, mucosa moist.  Mallampati 3.  CARDIOVASCULAR:  S1, S2 normal.  RESPIRATORY:  Bilateral air entry equal.  SKIN:  No palmar erythema or spider angioma.  NEUROLOGIC:  No asterixis.  PSYCHIATRY:  Affect appropriate.  LOWER EXTREMITY:  No pedal edema.    IMPRESSION:  1.  Perianal Crohn disease - currently inactive with anal stenosis and stenosis   at the ileocolic anastomosis (stable per recent MRE) in clinical remission on   Remicade 10 mg/kg every six weeks.  2.  Intermittent solid food dysphagia.  3.  Night sweats.    RECOMMENDATIONS:  1.  Schedule EGD and colonoscopy.  2.  Chest x-ray, PA and lateral.  3.  QuantiFERON Gold TB test.  4.  CRP level.  5.  Further recommendation based on above.  The patient would like to get her   endoscopy and colonoscopy done in September.  I will discuss with Dr. Barbour   especially if she would like to do the endoscopic evaluation.      ACT/HN  dd: 07/20/2018 08:49:21 (CDT)  td: 07/20/2018 11:19:32 (CDT)  Doc ID   #[id number]  Job ID #[id number]    CC:

## 2018-09-11 DIAGNOSIS — Z12.11 SPECIAL SCREENING FOR MALIGNANT NEOPLASMS, COLON: Primary | ICD-10-CM

## 2018-09-11 RX ORDER — POLYETHYLENE GLYCOL 3350, SODIUM SULFATE ANHYDROUS, SODIUM BICARBONATE, SODIUM CHLORIDE, POTASSIUM CHLORIDE 236; 22.74; 6.74; 5.86; 2.97 G/4L; G/4L; G/4L; G/4L; G/4L
4 POWDER, FOR SOLUTION ORAL ONCE
Qty: 4000 ML | Refills: 0 | Status: SHIPPED | OUTPATIENT
Start: 2018-09-11 | End: 2018-09-11

## 2018-10-09 ENCOUNTER — LAB VISIT (OUTPATIENT)
Dept: LAB | Facility: HOSPITAL | Age: 58
End: 2018-10-09
Attending: INTERNAL MEDICINE
Payer: COMMERCIAL

## 2018-10-09 DIAGNOSIS — R05.9 COUGH: ICD-10-CM

## 2018-10-09 PROCEDURE — 36415 COLL VENOUS BLD VENIPUNCTURE: CPT

## 2018-10-09 PROCEDURE — 86480 TB TEST CELL IMMUN MEASURE: CPT

## 2018-10-10 ENCOUNTER — HOSPITAL ENCOUNTER (OUTPATIENT)
Facility: HOSPITAL | Age: 58
Discharge: HOME | End: 2018-10-10
Attending: INTERNAL MEDICINE | Admitting: INTERNAL MEDICINE
Payer: COMMERCIAL

## 2018-10-10 ENCOUNTER — ANESTHESIA EVENT (OUTPATIENT)
Dept: ENDOSCOPY | Facility: HOSPITAL | Age: 58
End: 2018-10-10
Payer: COMMERCIAL

## 2018-10-10 ENCOUNTER — ANESTHESIA (OUTPATIENT)
Dept: ENDOSCOPY | Facility: HOSPITAL | Age: 58
End: 2018-10-10
Payer: COMMERCIAL

## 2018-10-10 VITALS
RESPIRATION RATE: 18 BRPM | TEMPERATURE: 99 F | DIASTOLIC BLOOD PRESSURE: 77 MMHG | WEIGHT: 165 LBS | BODY MASS INDEX: 26.52 KG/M2 | HEART RATE: 90 BPM | OXYGEN SATURATION: 100 % | SYSTOLIC BLOOD PRESSURE: 167 MMHG | HEIGHT: 66 IN

## 2018-10-10 DIAGNOSIS — K50.812 CROHN'S DISEASE OF BOTH SMALL AND LARGE INTESTINE WITH INTESTINAL OBSTRUCTION: Primary | ICD-10-CM

## 2018-10-10 DIAGNOSIS — K50.00 CROHN'S DISEASE OF SMALL INTESTINE: ICD-10-CM

## 2018-10-10 LAB
M TB IFN-G CD4+ BCKGRND COR BLD-ACNC: 0.01 IU/ML
MITOGEN IGNF BCKGRD COR BLD-ACNC: 9.88 IU/ML
MITOGEN IGNF BCKGRD COR BLD-ACNC: NEGATIVE [IU]/ML
NIL: 0.03 IU/ML
TB2 - NIL: 0.01 IU/ML

## 2018-10-10 PROCEDURE — 88305 TISSUE EXAM BY PATHOLOGIST: CPT | Mod: 26,,, | Performed by: PATHOLOGY

## 2018-10-10 PROCEDURE — 43249 ESOPH EGD DILATION <30 MM: CPT | Performed by: INTERNAL MEDICINE

## 2018-10-10 PROCEDURE — 63600175 PHARM REV CODE 636 W HCPCS: Performed by: ANESTHESIOLOGY

## 2018-10-10 PROCEDURE — 25000003 PHARM REV CODE 250: Performed by: INTERNAL MEDICINE

## 2018-10-10 PROCEDURE — 45380 COLONOSCOPY AND BIOPSY: CPT | Mod: ,,, | Performed by: INTERNAL MEDICINE

## 2018-10-10 PROCEDURE — 27201012 HC FORCEPS, HOT/COLD, DISP: Performed by: INTERNAL MEDICINE

## 2018-10-10 PROCEDURE — 25000003 PHARM REV CODE 250: Performed by: NURSE ANESTHETIST, CERTIFIED REGISTERED

## 2018-10-10 PROCEDURE — 45380 COLONOSCOPY AND BIOPSY: CPT | Performed by: INTERNAL MEDICINE

## 2018-10-10 PROCEDURE — 37000008 HC ANESTHESIA 1ST 15 MINUTES: Performed by: INTERNAL MEDICINE

## 2018-10-10 PROCEDURE — C1726 CATH, BAL DIL, NON-VASCULAR: HCPCS | Performed by: INTERNAL MEDICINE

## 2018-10-10 PROCEDURE — E9220 PRA ENDO ANESTHESIA: HCPCS | Mod: ,,, | Performed by: NURSE ANESTHETIST, CERTIFIED REGISTERED

## 2018-10-10 PROCEDURE — 88305 TISSUE EXAM BY PATHOLOGIST: CPT | Performed by: PATHOLOGY

## 2018-10-10 PROCEDURE — 37000009 HC ANESTHESIA EA ADD 15 MINS: Performed by: INTERNAL MEDICINE

## 2018-10-10 PROCEDURE — 63600175 PHARM REV CODE 636 W HCPCS: Performed by: NURSE ANESTHETIST, CERTIFIED REGISTERED

## 2018-10-10 PROCEDURE — 43249 ESOPH EGD DILATION <30 MM: CPT | Mod: 51,,, | Performed by: INTERNAL MEDICINE

## 2018-10-10 RX ORDER — SODIUM CHLORIDE 9 MG/ML
INJECTION, SOLUTION INTRAVENOUS CONTINUOUS
Status: DISCONTINUED | OUTPATIENT
Start: 2018-10-11 | End: 2018-10-10 | Stop reason: HOSPADM

## 2018-10-10 RX ORDER — GLYCOPYRROLATE 0.2 MG/ML
INJECTION INTRAMUSCULAR; INTRAVENOUS
Status: DISCONTINUED | OUTPATIENT
Start: 2018-10-10 | End: 2018-10-10

## 2018-10-10 RX ORDER — FENTANYL CITRATE 50 UG/ML
25 INJECTION, SOLUTION INTRAMUSCULAR; INTRAVENOUS
Status: DISCONTINUED | OUTPATIENT
Start: 2018-10-10 | End: 2018-10-10 | Stop reason: HOSPADM

## 2018-10-10 RX ORDER — PROPOFOL 10 MG/ML
VIAL (ML) INTRAVENOUS CONTINUOUS PRN
Status: DISCONTINUED | OUTPATIENT
Start: 2018-10-10 | End: 2018-10-10

## 2018-10-10 RX ORDER — SODIUM CHLORIDE 0.9 % (FLUSH) 0.9 %
3 SYRINGE (ML) INJECTION
Status: DISCONTINUED | OUTPATIENT
Start: 2018-10-10 | End: 2018-10-10 | Stop reason: HOSPADM

## 2018-10-10 RX ORDER — PROPOFOL 10 MG/ML
VIAL (ML) INTRAVENOUS
Status: DISCONTINUED | OUTPATIENT
Start: 2018-10-10 | End: 2018-10-10

## 2018-10-10 RX ORDER — LIDOCAINE HCL/PF 100 MG/5ML
SYRINGE (ML) INTRAVENOUS
Status: DISCONTINUED | OUTPATIENT
Start: 2018-10-10 | End: 2018-10-10

## 2018-10-10 RX ADMIN — PROPOFOL 50 MG: 10 INJECTION, EMULSION INTRAVENOUS at 10:10

## 2018-10-10 RX ADMIN — GLYCOPYRROLATE 0.2 MG: 0.2 INJECTION, SOLUTION INTRAMUSCULAR; INTRAVENOUS at 10:10

## 2018-10-10 RX ADMIN — PROPOFOL 200 MCG/KG/MIN: 10 INJECTION, EMULSION INTRAVENOUS at 10:10

## 2018-10-10 RX ADMIN — LIDOCAINE HYDROCHLORIDE 50 MG: 20 INJECTION, SOLUTION INTRAVENOUS at 10:10

## 2018-10-10 RX ADMIN — PROPOFOL 70 MG: 10 INJECTION, EMULSION INTRAVENOUS at 10:10

## 2018-10-10 RX ADMIN — SODIUM CHLORIDE: 0.9 INJECTION, SOLUTION INTRAVENOUS at 10:10

## 2018-10-10 RX ADMIN — FENTANYL CITRATE 25 MCG: 50 INJECTION INTRAMUSCULAR; INTRAVENOUS at 11:10

## 2018-10-10 RX ADMIN — SODIUM CHLORIDE: 0.9 INJECTION, SOLUTION INTRAVENOUS at 09:10

## 2018-10-10 NOTE — H&P
Short Stay Endoscopy History and Physical    PCP - Jamie Polo MD  Referring Physician - Jamie Polo MD  [address] [place] [address]    Procedure - EGD  ASA - per anesthesia  Mallampati - per anesthesia  History of Anesthesia problems - no  Family history Anesthesia problems -  no   Plan of anesthesia - General    HPI  58 y.o. female  Reason for procedure:  Dysphagia, history of esophageal stricture  Anal stricture and ileocolonic anastomotic stricture- f/u of Crohn's disease activity on current treatment    ROS:  Constitutional: No fevers, chills, No weight loss  CV: No chest pain  Pulm: No cough, No shortness of breath  GI: see HPI    Medical History:  has a past medical history of Allergic rhinitis, Anemia, Arthritis in Crohn's disease, Breast cancer (dxed 4/2014), Colon polyp, Crohn's disease, Dysphagia, pharyngoesophageal phase, Ocular hypertension, Personal history of colonic polyps, and Vitamin B deficiency.    Surgical History:  has a past surgical history that includes Colonoscopy; Esophagogastroduodenoscopy; colon resections; Appendectomy; Knee surgery (left); Breast lumpectomy (Right); fistula repairs; Upper gastrointestinal endoscopy; Cholecystectomy; Small intestine surgery; Colon surgery (1972 or 1973, 1979); COLONOSCOPY (N/A, 8/29/2017); ESOPHAGOGASTRODUODENOSCOPY (EGD) (N/A, 10/24/2016); COLONOSCOPY (N/A, 10/24/2016); COLONOSCOPY (N/A, 4/7/2015); and EGD (ESOPHAGOGASTRODUODENOSCOPY) (N/A, 5/23/2013).    Family History: family history includes Breast cancer in her sister; Colon cancer in her mother; Heart attack in her father; Heart disease in her brother and paternal uncle; Irritable bowel syndrome in her sister; Ovarian cancer in her maternal aunt; Pancreatic cancer in her maternal grandmother; Stomach cancer in her maternal uncle.. Otherwise no colon cancer, inflammatory bowel disease, or GI malignancies.    Social History:  reports that  has never smoked. she has never  used smokeless tobacco. She reports that she does not drink alcohol or use drugs.    Review of patient's allergies indicates:   Allergen Reactions    Sulfa (sulfonamide antibiotics)      Other reaction(s): Rash       Medications:   Medications Prior to Admission   Medication Sig Dispense Refill Last Dose    bisacodyl (DULCOLAX) 5 mg EC tablet Take 5 mg by mouth daily as needed for Constipation.   Taking    cholecalciferol, vitamin D3, 5,000 unit capsule Take 2,000 Units by mouth. 1 Capsule Oral Every day   Taking    cyanocobalamin 1,000 mcg/mL injection 1,000 mcg every 30 days.   1 Taking    fluticasone (FLONASE) 50 mcg/actuation nasal spray 1 spray by Each Nare route daily as needed.   0 Taking    INFLIXIMAB (REMICADE IV) Inject 10 mg/kg into the vein every 6 weeks.    Taking    tamoxifen (NOLVADEX) 20 MG Tab Take 20 mg by mouth once daily.   Taking       Physical Exam:    Vital Signs: There were no vitals filed for this visit.    General Appearance: Well appearing in no acute distress  Abdomen: Soft, non tender, non distended with normal bowel sounds, no masses      Labs:  Lab Results   Component Value Date    WBC 7.28 06/15/2017    HGB 10.6 (L) 06/15/2017    HCT 34.6 (L) 06/15/2017     06/15/2017    ALT 21 06/15/2017    AST 20 06/15/2017     01/30/2017    K 3.8 01/30/2017     01/30/2017    CREATININE 1.0 01/30/2017    BUN 21 (H) 01/30/2017    CO2 20 (L) 01/30/2017       I have explained the risks and benefits of this endoscopic procedure to the patient including but not limited to bleeding, inflammation, infection, perforation, and death.      Diamond Barbour MD

## 2018-10-10 NOTE — [employer] PATIENT INSTRUCTIONS
Discharge Summary/Instructions after an Endoscopic Procedure  Patient Name: Aline Fuller  Patient MRN: [id number]  Patient YOB: 1960  Wednesday, October 10, 2018  Diamond Barbour MD  RESTRICTIONS:  During your procedure today, you received medications for sedation.  These   medications may affect your judgment, balance and coordination.  Therefore,   for 24 hours, you have the following restrictions:   - DO NOT drive a car, operate machinery, make legal/financial decisions,   sign important papers or drink alcohol.    ACTIVITY:  Today: no heavy lifting, straining or running due to procedural   sedation/anesthesia.  The following day: return to full activity including work.  DIET:  Eat and drink normally unless instructed otherwise.     TREATMENT FOR COMMON SIDE EFFECTS:  - Mild abdominal pain, nausea, belching, bloating or excessive gas:  rest,   eat lightly and use a heating pad.  - Sore Throat: treat with throat lozenges and/or gargle with warm salt   water.  - Because air was used during the procedure, expelling large amounts of air   from your rectum or belching is normal.  - If a bowel prep was taken, you may not have a bowel movement for 1-3 days.    This is normal.  SYMPTOMS TO WATCH FOR AND REPORT TO YOUR PHYSICIAN:  1. Abdominal pain or bloating, other than gas cramps.  2. Chest pain.  3. Back pain.  4. Signs of infection such as: chills or fever occurring within 24 hours   after the procedure.  5. Rectal bleeding, which would show as bright red, maroon, or black stools.   (A tablespoon of blood from the rectum is not serious, especially if   hemorrhoids are present.)  6. Vomiting.  7. Weakness or dizziness.  GO DIRECTLY TO THE NEAREST EMERGENCY ROOM IF YOU HAVE ANY OF THE FOLLOWING:      Difficulty breathing              Chills and/or fever over 101 F   Persistent vomiting and/or vomiting blood   Severe abdominal pain   Severe chest pain   Black, tarry stools   Bleeding- more than one  tablespoon   Any other symptom or condition that you feel may need urgent attention  Your doctor recommends these additional instructions:  If any biopsies were taken, your doctors clinic will contact you in 1 to 2   weeks with any results.  - Discharge patient to home.   - Patient has a contact number available for emergencies.  The signs and   symptoms of potential delayed complications were discussed with the   patient.  Return to normal activities tomorrow.  Written discharge   instructions were provided to the patient.   - Resume previous diet.   - Continue present medications.   - Return to referring physician.   For questions, problems or results please call your physician - Diamond Barbour MD at Work:  [phone].  [facility], EMERGENCY ROOM PHONE NUMBER: [phone]  IF A COMPLICATION OR EMERGENCY SITUATION ARISES AND YOU ARE UNABLE TO REACH   YOUR PHYSICIAN - GO DIRECTLY TO THE EMERGENCY ROOM.  Diamond Barbour MD  10/10/2018 10:23:07 AM  This report has been verified and signed electronically.  [employer]

## 2018-10-10 NOTE — ANESTHESIA POSTPROCEDURE EVALUATION
"Anesthesia Post Evaluation    Patient: Aline Fuller    Procedure(s) Performed: Procedure(s) (LRB):  EGD (ESOPHAGOGASTRODUODENOSCOPY) (N/A)  COLONOSCOPY (N/A)    Final Anesthesia Type: general  Patient location during evaluation: PACU  Patient participation: Yes- Able to Participate  Level of consciousness: awake and alert and oriented  Post-procedure vital signs: reviewed and stable  Pain management: adequate  Airway patency: patent  PONV status at discharge: No PONV  Anesthetic complications: no      Cardiovascular status: blood pressure returned to baseline  Respiratory status: unassisted  Hydration status: euvolemic  Follow-up not needed.        Visit Vitals  BP (!) 167/77   Pulse 90   Temp 37 °C (98.6 °F) (Temporal)   Resp 18   Ht 5' 6" (1.676 m)   Wt 74.8 kg (165 lb)   SpO2 100%   Breastfeeding? No   BMI 26.63 kg/m²       Pain/Jil Score: Pain Assessment Performed: Yes (10/10/2018 11:38 AM)  Presence of Pain: complains of pain/discomfort (10/10/2018 11:38 AM)  Pain Rating Prior to Med Admin: 5 (10/10/2018 11:23 AM)  Jil Score: 10 (10/10/2018 11:38 AM)        "

## 2018-10-10 NOTE — ANESTHESIA PREPROCEDURE EVALUATION
10/10/2018  Aline Fuller is a 58 y.o., female.  Patient Active Problem List   Diagnosis    Breast cancer    Crohn's disease of both small and large intestine with intestinal obstruction    Vitamin D deficiency    High risk medication use    Crohn's disease of small intestine       Anesthesia Evaluation    I have reviewed the Patient Summary Reports.     I have reviewed the Medications.     Review of Systems  Anesthesia Hx:  No problems with previous Anesthesia Denies Hx of Anesthetic complications  Denies Family Hx of Anesthesia complications.   Denies Personal Hx of Anesthesia complications.   Social:  Former Smoker    Hematology/Oncology:  Hematology Normal       -- Cancer in past history:  Breast   EENT/Dental:EENT/Dental Normal   Cardiovascular:  Cardiovascular Normal Exercise tolerance: good     Pulmonary:  Pulmonary Normal Sinus cogestion and drip    Renal/:  Renal/ Normal     Hepatic/GI:  Hepatic/GI Normal Bowel Prep. Crohn's   Musculoskeletal:  Musculoskeletal Normal    Neurological:  Neurology Normal    Endocrine:  Endocrine Normal    Dermatological:  Skin Normal    Psych:  Psychiatric Normal           Physical Exam  General:  Well nourished    Airway/Jaw/Neck:  Airway Findings: Mouth Opening: Normal Tongue: Normal  General Airway Assessment: Adult  TM Distance: Normal, at least 6 cm  Jaw/Neck Findings:  Neck ROM: Normal ROM     Eyes/Ears/Nose:  EYES/EARS/NOSE FINDINGS: Normal   Dental:  Dental Findings: In tact   Chest/Lungs:  Chest/Lungs Findings: Clear to auscultation, Normal Respiratory Rate     Heart/Vascular:  Heart Findings: Rate: Normal  Sounds: Normal     Abdomen:  Abdomen Findings: Normal    Musculoskeletal:  Musculoskeletal Findings: Normal   Skin:  Skin Findings: Normal    Mental Status:  Mental Status Findings:  Cooperative, Alert and Oriented         Anesthesia Plan  Type of  Anesthesia, risks & benefits discussed:  Anesthesia Type:  general  Patient's Preference: General  Intra-op Monitoring Plan: standard ASA monitors  Intra-op Monitoring Plan Comments:   Post Op Pain Control Plan:   Post Op Pain Control Plan Comments:   Induction:   IV  Beta Blocker:  Patient is not currently on a Beta-Blocker (No further documentation required).       Informed Consent: Patient understands risks and agrees with Anesthesia plan.  Questions answered. Anesthesia consent signed with patient.  ASA Score: 2     Day of Surgery Review of History & Physical: I have interviewed and examined the patient. I have reviewed the patient's H&P dated:  There are no significant changes.  H&P update referred to the provider.     Anesthesia Plan Notes: NPO confirmed        Ready For Surgery From Anesthesia Perspective.

## 2018-10-10 NOTE — TRANSFER OF CARE
"Anesthesia Transfer of Care Note    Patient: Aline Fuller    Procedure(s) Performed: Procedure(s) (LRB):  EGD (ESOPHAGOGASTRODUODENOSCOPY) (N/A)  COLONOSCOPY (N/A)    Patient location: PACU    Anesthesia Type: general    Transport from OR: Transported from OR on room air with adequate spontaneous ventilation    Post pain: adequate analgesia    Post assessment: no apparent anesthetic complications and tolerated procedure well    Post vital signs: stable    Level of consciousness: awake    Nausea/Vomiting: no nausea/vomiting    Complications: none    Transfer of care protocol was followed      Last vitals:   Visit Vitals  /62   Pulse 95   Temp 37 °C (98.6 °F) (Temporal)   Resp 18   Ht 5' 6" (1.676 m)   Wt 74.8 kg (165 lb)   SpO2 98%   Breastfeeding? No   BMI 26.63 kg/m²     "

## 2018-10-10 NOTE — DISCHARGE INSTRUCTIONS

## 2018-10-10 NOTE — [employer] PATIENT INSTRUCTIONS
Discharge Summary/Instructions after an Endoscopic Procedure  Patient Name: Aline Fuller  Patient MRN: [id number]  Patient YOB: 1960  Wednesday, October 10, 2018  Diamond Barbour MD  RESTRICTIONS:  During your procedure today, you received medications for sedation.  These   medications may affect your judgment, balance and coordination.  Therefore,   for 24 hours, you have the following restrictions:   - DO NOT drive a car, operate machinery, make legal/financial decisions,   sign important papers or drink alcohol.    ACTIVITY:  Today: no heavy lifting, straining or running due to procedural   sedation/anesthesia.  The following day: return to full activity including work.  DIET:  Eat and drink normally unless instructed otherwise.     TREATMENT FOR COMMON SIDE EFFECTS:  - Mild abdominal pain, nausea, belching, bloating or excessive gas:  rest,   eat lightly and use a heating pad.  - Sore Throat: treat with throat lozenges and/or gargle with warm salt   water.  - Because air was used during the procedure, expelling large amounts of air   from your rectum or belching is normal.  - If a bowel prep was taken, you may not have a bowel movement for 1-3 days.    This is normal.  SYMPTOMS TO WATCH FOR AND REPORT TO YOUR PHYSICIAN:  1. Abdominal pain or bloating, other than gas cramps.  2. Chest pain.  3. Back pain.  4. Signs of infection such as: chills or fever occurring within 24 hours   after the procedure.  5. Rectal bleeding, which would show as bright red, maroon, or black stools.   (A tablespoon of blood from the rectum is not serious, especially if   hemorrhoids are present.)  6. Vomiting.  7. Weakness or dizziness.  GO DIRECTLY TO THE NEAREST EMERGENCY ROOM IF YOU HAVE ANY OF THE FOLLOWING:      Difficulty breathing              Chills and/or fever over 101 F   Persistent vomiting and/or vomiting blood   Severe abdominal pain   Severe chest pain   Black, tarry stools   Bleeding- more than one  tablespoon   Any other symptom or condition that you feel may need urgent attention  Your doctor recommends these additional instructions:  If any biopsies were taken, your doctors clinic will contact you in 1 to 2   weeks with any results.  - Discharge patient to home.   - Patient has a contact number available for emergencies.  The signs and   symptoms of potential delayed complications were discussed with the   patient.  Return to normal activities tomorrow.  Written discharge   instructions were provided to the patient.   - Resume previous diet.   - Continue present medications.   - Await pathology results.   - Return to referring physician.   - Repeat colonoscopy (date not yet determined) to assess disease activity.   - Needs KARON for anal dilation every 4-6 mos due to severity and recurrence   of stenosis  - Recommend MRE.   For questions, problems or results please call your physician - Diamond Barbour MD at Work:  [phone].  [facility] [place], EMERGENCY ROOM PHONE NUMBER: [phone]  IF A COMPLICATION OR EMERGENCY SITUATION ARISES AND YOU ARE UNABLE TO REACH   YOUR PHYSICIAN - GO DIRECTLY TO THE EMERGENCY ROOM.  Diamond Barbour MD  10/10/2018 11:08:43 AM  This report has been verified and signed electronically.  [employer]

## 2018-10-15 ENCOUNTER — TELEPHONE (OUTPATIENT)
Dept: GASTROENTEROLOGY | Facility: CLINIC | Age: 58
End: 2018-10-15

## 2018-10-15 DIAGNOSIS — K50.019 CROHN'S DISEASE OF SMALL INTESTINE WITH COMPLICATION: Primary | ICD-10-CM

## 2018-10-15 NOTE — TELEPHONE ENCOUNTER
----- Message from Jamie Polo MD sent at 10/15/2018  8:27 AM CDT -----  Colon biopsies look fine. Please schedule MRI enterography (ordered) to evaluate the small intestines.

## 2018-10-15 NOTE — PROGRESS NOTES
Colon biopsies look fine. Please schedule MRI enterography (ordered) to evaluate the small intestines.

## 2018-10-15 NOTE — TELEPHONE ENCOUNTER
Ma spoke with pt ,test results given pt verbalized understanding , pt was ask to scheduled mri entero , pt wants to know why,?Ma told pt its to evaluate her intestines per Dr. Polo request pt wants more information  Dr. Polo please advise

## 2018-10-16 ENCOUNTER — TELEPHONE (OUTPATIENT)
Dept: GASTROENTEROLOGY | Facility: CLINIC | Age: 58
End: 2018-10-16

## 2018-10-16 NOTE — TELEPHONE ENCOUNTER
Ma spoke with pt and informed her Dr. Polo wants an MRI , patient states she had an MRi in June and doesn't know why they need another one, patient states she will contact Dr. Barbour office for more feed back and once she speak with them she will call the office to schedule MRi

## 2018-10-16 NOTE — TELEPHONE ENCOUNTER
----- Message from Zahida Diez MA sent at 10/16/2018  2:22 PM CDT -----  Contact: [phone]  Needs Advice    Reason for call: pt said that Dr Polo is ordering a MRI ENTEROGRAPHY but that she just had one on June 25 th at the imaging center across [facility]. And when she met with Dr Polo on 7/20 he went over the results with her. Would like to know if she has to do another one this soon?              Communication Preference:  [phone]    Additional Information: Please advise

## 2018-10-16 NOTE — TELEPHONE ENCOUNTER
Dr. Polo,  Please advise pt on MRI,  she's states she had an MRI in June which she did and results given to her she had a follow up with you on July 20 , she wants to know why she needs another MRI so soon , She states Dr. Barbour did not see results form MRI in Junse

## 2018-10-17 ENCOUNTER — TELEPHONE (OUTPATIENT)
Dept: ENDOSCOPY | Facility: HOSPITAL | Age: 58
End: 2018-10-17

## 2018-10-17 ENCOUNTER — PATIENT MESSAGE (OUTPATIENT)
Dept: GASTROENTEROLOGY | Facility: CLINIC | Age: 58
End: 2018-10-17

## 2018-10-17 ENCOUNTER — TELEPHONE (OUTPATIENT)
Dept: GASTROENTEROLOGY | Facility: CLINIC | Age: 58
End: 2018-10-17

## 2018-10-17 DIAGNOSIS — K50.019 CROHN'S DISEASE OF SMALL INTESTINE WITH COMPLICATION: Primary | ICD-10-CM

## 2018-10-17 NOTE — TELEPHONE ENCOUNTER
----- Message from Jamie Polo MD sent at 10/17/2018  2:28 PM CDT -----  Please consult : Anal stricture  Please send  lab request for Infliximab levels to be done at local [facility] before her next Remicade infusion.    ----- Message -----  From: Diamond Barbour MD  Sent: 10/17/2018   2:01 PM  To: Jamie Polo MD    Spoke to Dr. Polo regarding path results and colonoscopy results with severe anal stricture and worsening inflammation and narrowing of the anastomosis.  MRE results reviewed from 6/2018 and no need to repeat unless symptoms change.  Recommended that Dr. Polo refer pt to CRS to get more regular anal dilations and also anal stricture biopsy to assess for  Dysplasia/cancer. Repeat remicade levels on higher dose remicade to see if any further optimization is possible, treatment change necessary or surgery

## 2018-10-17 NOTE — PROGRESS NOTES
Spoke to Dr. Polo regarding path results and colonoscopy results with severe anal stricture and worsening inflammation and narrowing of the anastomosis.  MRE results reviewed from 6/2018 and no need to repeat unless symptoms change.  Recommended that Dr. Polo refer pt to CRS to get more regular anal dilations and also anal stricture biopsy to assess for  Dysplasia/cancer. Repeat remicade levels on higher dose remicade to see if any further optimization is possible, treatment change necessary or surgery

## 2018-10-17 NOTE — TELEPHONE ENCOUNTER
Ma called pt to schedule CRS appt , no answer message left on pt vm and appt scheduled , pt also needs labs done at lab [facility] orders mailed to patient   Ma awaiting pt to call the office back

## 2018-10-24 ENCOUNTER — TELEPHONE (OUTPATIENT)
Dept: ENDOSCOPY | Facility: HOSPITAL | Age: 58
End: 2018-10-24

## 2018-10-24 NOTE — TELEPHONE ENCOUNTER
----- Message from Terrence Youssef sent at 10/24/2018  1:18 PM CDT -----  Contact: Carla Rossi Pharmacist:[phone]  .Rx Refill/Request     Is this a Refill or New Rx:Refill    Rx Name and Strength:INFLIXIMAB (REMICADE IV)    Preferred Pharmacy with phone number: Carla Rossi Pharmacist:[phone]  Communication Preference:Carla Rossi Pharmacist:[phone]  Additional Information: Pharmacist would like to speak with the nurse.

## 2018-10-24 NOTE — TELEPHONE ENCOUNTER
----- Message from Kyung Kiser sent at 10/24/2018 10:08 AM CDT -----  Contact: alia/micky - [phone]  Christ vora needs refills on remicade - please call kyle - [phone]

## 2018-10-25 NOTE — TELEPHONE ENCOUNTER
Message   Received: Today   Message Contents   MD Yodit Michaels MA   Caller: Unspecified (Yesterday, 12:09 PM)             The Rx was faxed yesterday.

## 2018-11-01 LAB
INFLIXIMAB AB SERPL-MCNC: <22 NG/ML
INFLIXIMAB SERPL-MCNC: 3 UG/ML

## 2018-11-26 ENCOUNTER — OFFICE VISIT (OUTPATIENT)
Dept: SURGERY | Facility: CLINIC | Age: 58
End: 2018-11-26
Payer: COMMERCIAL

## 2018-11-26 VITALS
HEIGHT: 66 IN | WEIGHT: 163.13 LBS | SYSTOLIC BLOOD PRESSURE: 157 MMHG | DIASTOLIC BLOOD PRESSURE: 63 MMHG | HEART RATE: 94 BPM | BODY MASS INDEX: 26.22 KG/M2

## 2018-11-26 DIAGNOSIS — K62.4 ANAL STENOSIS: ICD-10-CM

## 2018-11-26 DIAGNOSIS — K50.018 CROHN'S DISEASE OF SMALL INTESTINE WITH OTHER COMPLICATION: Primary | ICD-10-CM

## 2018-11-26 PROCEDURE — 99203 OFFICE O/P NEW LOW 30 MIN: CPT | Mod: S$GLB,,, | Performed by: COLON & RECTAL SURGERY

## 2018-11-26 PROCEDURE — 99999 PR PBB SHADOW E&M-EST. PATIENT-LVL III: CPT | Mod: PBBFAC,,, | Performed by: COLON & RECTAL SURGERY

## 2018-11-26 PROCEDURE — 3008F BODY MASS INDEX DOCD: CPT | Mod: CPTII,S$GLB,, | Performed by: COLON & RECTAL SURGERY

## 2018-11-26 RX ORDER — MUPIROCIN 20 MG/G
OINTMENT TOPICAL
COMMUNITY
Start: 2018-11-09 | End: 2020-08-03

## 2018-11-26 RX ORDER — PREDNISONE 10 MG/1
TABLET ORAL
COMMUNITY
Start: 2018-11-09 | End: 2020-08-03

## 2018-11-26 RX ORDER — INFLIXIMAB 100 MG/10ML
INJECTION, POWDER, LYOPHILIZED, FOR SOLUTION INTRAVENOUS
COMMUNITY
End: 2021-05-03 | Stop reason: SDUPTHER

## 2018-11-26 RX ORDER — TRIAMCINOLONE ACETONIDE 1 MG/G
CREAM TOPICAL
COMMUNITY
Start: 2018-11-09 | End: 2020-08-03

## 2018-11-26 NOTE — LETTER
December 3, 2018      Jamie Polo MD  [address]  [facility] [address]           [facility]  [address]  [facility] [address]  Phone: [phone]          Patient: Aline Fuller   MR Number: [id number]   YOB: 1960   Date of Visit: 11/26/2018       Dear Dr. Jamie Polo:    Thank you for referring Aline Fuller to me for evaluation. Attached you will find relevant portions of my assessment and plan of care.    If you have questions, please do not hesitate to call me. I look forward to following Aline Fuller along with you.    Sincerely,    Rhett Aparicio MD    Enclosure  CC:  No Recipients    If you would like to receive this communication electronically, please contact [email or web address] or [phone] to request more information on [employer] Link access.    For providers and/or their staff who would like to refer a patient to [facility], please contact us through our one-stop-shop provider referral line, [facility], [place], at [phone].    If you feel you have received this communication in error or would no longer like to receive these types of communications, please e-mail [email or web address]

## 2018-11-26 NOTE — PROGRESS NOTES
History & Physical    SUBJECTIVE:     History of Present Illness:  Patient is a 58 y.o. female with PMHx significant for Crohns Disease, anemia, allergic rhinitis, arthritis, dysphagia, and ocular HTN who presents for evaluation of a severe ilecolonic anastomotic stricture noted on recent colonoscopic exam. Patient notes that at the beginning of October she was having problems with constipation, such that although she was having regular bowel movements she was having to strain and work much harder to pass stool compared to usual. She notes that at the beginning of this time period she was taking dulcolax which helped some, but then she transitioned to drinking prune juice and eating prunes which helped regular her bowel movements and helped make her bowel movements more soft. She now no longer has issues with constipation or difficult bowel movements. She denies BRBPR, abdominal pain or cramping. Also denies fevers, chills, or recent unintentional weight loss. Does note a weight gain of 20 lbs over the past year, but states she has already had a workup by her endocrinologist to look for source of weight gain which has turned up negative. States she has had a personal history of external hemorrhoids and anal fissures but neigther have given her much issue over the past few months. Is currently on Remicade every 6 weeks for control of Crohns disease. Is not on ASA or blood thinners.     Chief Complaint   Patient presents with    Anal Stricture       Review of patient's allergies indicates:   Allergen Reactions    Sulfa (sulfonamide antibiotics)      Other reaction(s): Rash       Current Outpatient Medications   Medication Sig Dispense Refill    bisacodyl (DULCOLAX) 5 mg EC tablet Take 5 mg by mouth daily as needed for Constipation.      cholecalciferol, vitamin D3, 5,000 unit capsule Take 2,000 Units by mouth. 1 Capsule Oral Every day      cyanocobalamin 1,000 mcg/mL injection 1,000 mcg every 30 days.   1     fluticasone (FLONASE) 50 mcg/actuation nasal spray 1 spray by Each Nare route daily as needed.   0    INFLIXIMAB (REMICADE IV) Inject 10 mg/kg into the vein every 6 weeks.       inFLIXimab (REMICADE) 100 mg injection Inject into the vein.      mupirocin (BACTROBAN) 2 % ointment       predniSONE (DELTASONE) 10 MG tablet       tamoxifen (NOLVADEX) 20 MG Tab Take 20 mg by mouth once daily.      triamcinolone acetonide 0.1% (KENALOG) 0.1 % cream        No current facility-administered medications for this visit.        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     Breast cancer dxed 4/2014 4/2014 diagnosed, Chemo/XRT completed Nov/Dec 2014, on tamoxifen    Colon polyp     Crohn's disease     Dysphagia, pharyngoesophageal phase     Ocular hypertension     Personal history of colonic polyps     Vitamin B deficiency      Past Surgical History:   Procedure Laterality Date    APPENDECTOMY      BREAST LUMPECTOMY Right     CHOLECYSTECTOMY      colon resections      COLON SURGERY  1972 or 1973, 1979    COLONOSCOPY      COLONOSCOPY N/A 10/24/2016    Procedure: COLONOSCOPY;  Surgeon: Jamie Polo MD;  Location: [address]);  Service: Endoscopy;  Laterality: N/A;    COLONOSCOPY N/A 8/29/2017    Procedure: COLONOSCOPY;  Surgeon: Diamond Barbour MD;  Location: [address]);  Service: Endoscopy;  Laterality: N/A;  schedule as 45 minute case    COLONOSCOPY N/A 10/10/2018    Procedure: COLONOSCOPY;  Surgeon: Diamond Barbour MD;  Location: [address]);  Service: Endoscopy;  Laterality: N/A;    COLONOSCOPY N/A 10/10/2018    Performed by Diamond [facility]bour MD at [facility] ([address] [place] [address])    COLONOSCOPY N/A 8/29/2017    [place]    COLONOSCOPY N/A 10/24/2016    Performed by Jamie Polo MD at [facility] ([address] [place] [address])    COLONOSCOPY N/A 4/7/2015    Performed by Jamie Polo  MD at [facility] (4TH FLR)    EGD (ESOPHAGOGASTRODUODENOSCOPY) N/A 10/10/2018    Performed by Diamond Barbour MD at [facility] (4TH FLR)    EGD (ESOPHAGOGASTRODUODENOSCOPY) N/A 5/23/2013    Performed by Jamie Polo MD at [facility] (4TH FLR)    ESOPHAGOGASTRODUODENOSCOPY      ESOPHAGOGASTRODUODENOSCOPY N/A 10/10/2018    Procedure: EGD (ESOPHAGOGASTRODUODENOSCOPY);  Surgeon: Diamond Barbour MD;  Location: [facility] (4TH FLR);  Service: Endoscopy;  Laterality: N/A;    ESOPHAGOGASTRODUODENOSCOPY (EGD) N/A 10/24/2016    [facility] at [facility] ([facility] FLR)    fistula repairs      KNEE SURGERY  left    SMALL INTESTINE SURGERY      1997    UPPER GASTROINTESTINAL ENDOSCOPY       Family History   Problem Relation Age of Onset    Colon cancer Mother 60    Heart attack Father     Breast cancer Sister     Irritable bowel syndrome Sister     Heart disease Brother     Ovarian cancer Maternal Aunt     Stomach cancer Maternal Uncle 68    Heart disease Paternal Uncle     Pancreatic cancer Maternal Grandmother     Celiac disease Neg Hx     Cirrhosis Neg Hx     Colon polyps Neg Hx     Crohn's disease Neg Hx     Cystic fibrosis Neg Hx     Esophageal cancer Neg Hx     Hemochromatosis Neg Hx     Inflammatory bowel disease Neg Hx     Liver cancer Neg Hx     Liver disease Neg Hx     Rectal cancer Neg Hx     Ulcerative colitis Neg Hx     Gadiel's disease Neg Hx      Social History     Tobacco Use    Smoking status: Never Smoker    Smokeless tobacco: Never Used   Substance Use Topics    Alcohol use: No    Drug use: No        Review of Systems:  Review of Systems   Constitutional: Negative for chills, fatigue, fever and unexpected weight change.   Respiratory: Negative for cough and shortness of breath.    Cardiovascular: Negative for chest pain and palpitations.   Gastrointestinal: Positive for constipation (improved over past few weeks). Negative for abdominal distention,  "abdominal pain, blood in stool, diarrhea, nausea and vomiting.   Musculoskeletal: Negative for arthralgias and myalgias.   Skin: Negative for color change and rash.   Neurological: Negative for seizures and headaches.   Hematological: Does not bruise/bleed easily.   Psychiatric/Behavioral: Negative for confusion. The patient is not nervous/anxious.        OBJECTIVE:     Vital Signs (Most Recent)  Pulse: 94 (11/26/18 1316)  BP: (!) 157/63 (11/26/18 1316)  5' 6" (1.676 m)  74 kg (163 lb 1.6 oz)     Physical Exam:  Physical Exam   Constitutional: She is oriented to person, place, and time. She appears well-developed and well-nourished. No distress.   HENT:   Head: Normocephalic and atraumatic.   Eyes: Conjunctivae are normal. Pupils are equal, round, and reactive to light.   Neck: Normal range of motion. Neck supple.   Cardiovascular: Normal rate and regular rhythm.   Pulmonary/Chest: Effort normal. No respiratory distress.   Abdominal: Soft. She exhibits no distension. There is no tenderness.   Musculoskeletal: Normal range of motion. She exhibits no edema.   Neurological: She is alert and oriented to person, place, and time.   Skin: Skin is warm and dry.   Psychiatric: She has a normal mood and affect. Her behavior is normal. Judgment normal.       Laboratory  Labs Reviewed.    Diagnostic Results:  Diagnostics Reviewed.   Colonoscopy (10/10/2018); Impression:             - Large perianal Crohn's skin tags with severe anal canal stenosis dilated with KARON.   - The entire examined colon is normal. Surveillance biopsies taken to rule out dysplasia   - Severe ilecolonic anastomotic stricture that was ulcerated and unable to traverse due to severity of stenosis.    ASSESSMENT/PLAN:     Ms. Aline Fuller is a 57 yo female who presents for evaluation of a severe ilecolonic anastomotic stricture noted on recent colonoscopic exam. Patient previously had issue with difficult to pass bowel movements and constipation. However, " she now notes easy bowel movements after making dietary changes.    PLAN:  -Will plan for future EUA, anal biopsy as per GI recommendations  -Patient would like to schedule early next year; when patient picks date can call clinic to schedule.          Shana Dillard MD  General/Colorectal Surgery-PGYI [facility]    I have interviewed and examined the patient, reviewed the notes and assessments, and/or personally supervised the procedure(s) performed by Dr. Dillard, and I concur with her/his documentation of Aline Fuller.  See below addendum for my evaluation and additional findings.    59 yo F with Crohn's disease, currently on Remicade, with a history of prior ileocecal resection with anastomotic stricture.  This does not appear to be symptomatic at this point.  However she was noted on her last colonoscopy in October to have was described as a severe anal canal stenosis that was dilated with digital rectal exam.  She was referred for examination under anesthesia to further evaluate the anal canal and rule out dysplasia.  At the present time she is relatively symptom-free and would like to hold off on surgery until March of next year.  For the meantime she will continue with her medical regimen for control of her Crohn's disease and she will call me when she is ready to schedule her anorectal examination anesthesia.    Rhett Aparicio MD, FACS, FASCRS  Staff Surgeon  Department of Colon & Rectal Surgery

## 2019-03-07 ENCOUNTER — TELEPHONE (OUTPATIENT)
Dept: SURGERY | Facility: CLINIC | Age: 59
End: 2019-03-07

## 2019-03-07 NOTE — TELEPHONE ENCOUNTER
----- Message from Terrence Youssef sent at 3/7/2019 10:12 AM CST -----  Contact: Pt:[phone]  .Needs Advice    Reason for call:Pt called and states she would like to speak with the nurse in regards to scheduling a biopsy with         Communication Preference:Pt:[phone]    Additional Information:

## 2019-03-11 ENCOUNTER — TELEPHONE (OUTPATIENT)
Dept: GASTROENTEROLOGY | Facility: CLINIC | Age: 59
End: 2019-03-11

## 2019-03-11 NOTE — TELEPHONE ENCOUNTER
Ma called pt to schedule follow up per Dr. Polo   Patient states she will call the office back once she checks her schedule  Ma informed ot that she can speak with phone  to schedule appt

## 2019-03-19 DIAGNOSIS — K62.4 ANAL STRICTURE: Primary | ICD-10-CM

## 2019-03-22 ENCOUNTER — ANESTHESIA EVENT (OUTPATIENT)
Dept: SURGERY | Facility: HOSPITAL | Age: 59
End: 2019-03-22
Payer: COMMERCIAL

## 2019-03-22 NOTE — PRE ADMISSION SCREENING
Anesthesia Assessment: Preoperative EQUATION    Planned Procedure: Procedure(s) (LRB):  Exam under anesthesia botox (Left)  Requested Anesthesia Type:General  Surgeon: Rhett Aparicio MD  Service: Colon and Rectal  Known or anticipated Date of Surgery:4/10/2019    Surgeon notes: reviewed and Anal stricture  Previous anesthesia records:10/10/18-EGD(Esophagogastroduodenoscopy Colonoscopy-General-no apparent anesthetic complications and tolerated procedure well    Anesthesia Hx:  No problems with previous Anesthesia Denies Hx of Anesthetic complications  Denies Family Hx of Anesthesia complications.   Denies Personal Hx of Anesthesia complications.     Last PCP note: No PCP at present  Subspecialty notes: Endocrinology, Gastroenterology  Tests already available:  Results have been reviewed.CXR-7/20/18      Plan:    Testing:  BMP, EKG and Calcium/Phosp      Patient  has previously scheduled Medical Appointment:Appointment on 4/9/19, prior to surgery date.    Navigation: Tests Scheduled. Labs-Calcium/Phosphorus/BMP/EKG on ?             Consults scheduled.OPOC or OS IM? & Phone only             Results will be tracked by Preop Clinic.                 Christel Caicedo RN  3/22/19

## 2019-03-22 NOTE — ANESTHESIA PREPROCEDURE EVALUATION
" Anesthesia Assessment: Preoperative EQUATION    Planned Procedure: Procedure(s) (LRB):  Exam under anesthesia botox (Left)  Requested Anesthesia Type:General  Surgeon: Rhett Aparicio MD  Service: Colon and Rectal  Known or anticipated Date of Surgery:4/10/2019    Surgeon notes: reviewed and Anal stricture  Previous anesthesia records:10/10/18-EGD(Esophagogastroduodenoscopy Colonoscopy-General-no apparent anesthetic complications and tolerated procedure well    Anesthesia Hx:  No problems with previous Anesthesia Denies Hx of Anesthetic complications  Denies Family Hx of Anesthesia complications.   Denies Personal Hx of Anesthesia complications.     Last PCP note: No PCP at present  Subspecialty notes: Endocrinology, Gastroenterology  Tests already available:  Results have been reviewed.CXR-7/20/18      Plan:    Testing:  BMP, EKG and Calcium/Phosphorus      Patient  has previously scheduled Medical Appointment:Appointment on 4/9/19, prior to surgery date.    Navigation: Phone Completed    Tests Scheduled. Labs-Calcium/Phosphorus/BMP/EKG on 4/9/19 @ 3:15p &3:30p                      Results will be tracked by Preop Clinic.     Plan reviewed with anesthesia--No "Clearance" needed prior to this surgery.                 Christel Caicedo RN  3/22/19     Addendum: Labs-Calcium/Phosphorus/BMP & EKG done & reviewed.  Christel Caicedo RN  4/9/19 03/22/2019  Aline Fuller is a 58 y.o., female.    Anesthesia Evaluation         Review of Systems  Anesthesia Hx:  No problems with previous Anesthesia Denies Family Hx of Anesthesia complications.   Denies Personal Hx of Anesthesia complications.   Social:  Non-Smoker, No Alcohol Use    Hematology/Oncology:         -- Anemia: Hematology Comments: Chronic/ B12 dificiency  --  Cancer in past history:  Breast surgery, chemotherapy and radiation  Oncology Comments: " Breast  Cancer hx     EENT/Dental:   Wears reading glasses   Cardiovascular:   Exercise tolerance: good Hypertension Walking daily/housework   Pulmonary:   History of Pneumonia over 20 years ago per patient   Hepatic/GI:   Narrowing of lower esophagus-S/p- esophageal dilation/Crohn's disease   Endocrine:   Hyperparathyroidism/Goiter   Dermatological:   eczema       Physical Exam  General:  Well nourished    Airway/Jaw/Neck:  Airway Findings: Mouth Opening: Normal Tongue: Normal  General Airway Assessment: Adult  Mallampati: II  TM Distance: Normal, at least 6 cm        Eyes/Ears/Nose:  EYES/EARS/NOSE FINDINGS: Normal   Dental:  Dental Findings: In tact   Chest/Lungs:  Chest/Lungs Clear    Heart/Vascular:  Heart Findings: Normal Heart murmur: negative Vascular Findings: Normal    Abdomen:  Abdomen Findings: Normal    Musculoskeletal:  Musculoskeletal Findings: Normal   Skin:  Skin Findings: Normal    Mental Status:  Mental Status Findings: Normal    Christel Caicedo RN  3/27/19    Anesthesia Plan  Type of Anesthesia, risks & benefits discussed:  Anesthesia Type:  general  Patient's Preference:   Intra-op Monitoring Plan:   Intra-op Monitoring Plan Comments:   Post Op Pain Control Plan:   Post Op Pain Control Plan Comments:   Induction:   IV  Beta Blocker:  Patient is not currently on a Beta-Blocker (No further documentation required).       Informed Consent: Patient understands risks and agrees with Anesthesia plan.  Questions answered. Anesthesia consent signed with patient.  ASA Score: 2     Day of Surgery Review of History & Physical:    H&P update referred to the surgeon.         Ready For Surgery From Anesthesia Perspective.

## 2019-03-27 DIAGNOSIS — Z01.818 PREOP TESTING: Primary | ICD-10-CM

## 2019-03-27 NOTE — PRE-PROCEDURE INSTRUCTIONS
Preop screen completed.  Preop instructions(bathing/fasting/directions/location of surgery/ and preop medication instructions reviewed with patient).  Patient instructed to hold/stop all blood thinning medications, prior to surgery, following the pre-surgery recommended guidelines.  Patient verbalized understanding.

## 2019-03-29 ENCOUNTER — TELEPHONE (OUTPATIENT)
Dept: PREADMISSION TESTING | Facility: HOSPITAL | Age: 59
End: 2019-03-29

## 2019-03-29 NOTE — TELEPHONE ENCOUNTER
----- Message from Christel Caicedo RN sent at 3/28/2019 12:06 PM CDT -----  Regarding: preop appts.  Courtney Anderson, Patient is having a EUA & Botox, with Dr. Aparicio on 4/10/19. Patient needs the following appts: Labs-Calcium/Phosporus/BMP/ & EKG(orders in Epic). Thank you. LOGAN

## 2019-04-09 ENCOUNTER — HOSPITAL ENCOUNTER (OUTPATIENT)
Dept: CARDIOLOGY | Facility: CLINIC | Age: 59
Discharge: HOME | End: 2019-04-09
Payer: COMMERCIAL

## 2019-04-09 ENCOUNTER — TELEPHONE (OUTPATIENT)
Dept: SURGERY | Facility: CLINIC | Age: 59
End: 2019-04-09

## 2019-04-09 ENCOUNTER — OFFICE VISIT (OUTPATIENT)
Dept: SURGERY | Facility: CLINIC | Age: 59
End: 2019-04-09
Payer: COMMERCIAL

## 2019-04-09 VITALS
WEIGHT: 169.31 LBS | HEART RATE: 99 BPM | DIASTOLIC BLOOD PRESSURE: 92 MMHG | SYSTOLIC BLOOD PRESSURE: 160 MMHG | BODY MASS INDEX: 27.33 KG/M2

## 2019-04-09 DIAGNOSIS — K62.4 ANAL STRICTURE: Primary | ICD-10-CM

## 2019-04-09 DIAGNOSIS — Z01.818 PREOP TESTING: ICD-10-CM

## 2019-04-09 PROCEDURE — 99024 POSTOP FOLLOW-UP VISIT: CPT | Mod: S$GLB,,, | Performed by: NURSE PRACTITIONER

## 2019-04-09 PROCEDURE — 99024 PR POST-OP FOLLOW-UP VISIT: ICD-10-PCS | Mod: S$GLB,,, | Performed by: NURSE PRACTITIONER

## 2019-04-09 PROCEDURE — 99999 PR PBB SHADOW E&M-EST. PATIENT-LVL III: ICD-10-PCS | Mod: PBBFAC,,, | Performed by: NURSE PRACTITIONER

## 2019-04-09 PROCEDURE — 93000 ELECTROCARDIOGRAM COMPLETE: CPT | Mod: S$GLB,,, | Performed by: INTERNAL MEDICINE

## 2019-04-09 PROCEDURE — 99999 PR PBB SHADOW E&M-EST. PATIENT-LVL III: CPT | Mod: PBBFAC,,, | Performed by: NURSE PRACTITIONER

## 2019-04-09 PROCEDURE — 93000 EKG 12-LEAD: ICD-10-PCS | Mod: S$GLB,,, | Performed by: INTERNAL MEDICINE

## 2019-04-09 NOTE — H&P (VIEW-ONLY)
History & Physical     SUBJECTIVE:      History of Present Illness:  Patient is a 58 y.o. female with PMHx significant for Crohns Disease, anemia, allergic rhinitis, arthritis, dysphagia, and ocular HTN who presents for evaluation of a severe ilecolonic anastomotic stricture noted on recent colonoscopic exam. Last seen Nov 2018. Patient notes that at the beginning of October she was having problems with constipation, such that although she was having regular bowel movements she was having to strain and work much harder to pass stool compared to usual. She notes that at the beginning of this time period she was taking dulcolax which helped some, but then she transitioned to drinking prune juice and eating prunes which helped regular her bowel movements and helped make her bowel movements more soft. She now no longer has issues with constipation or difficult bowel movements. She denies BRBPR, abdominal pain or cramping. Also denies fevers, chills, or recent unintentional weight loss. Does note a weight gain of 20 lbs over the past year, but states she has already had a workup by her endocrinologist to look for source of weight gain which has turned up negative. States she has had a personal history of external hemorrhoids and anal fissures but neigther have given her much issue over the past few months. Is currently on Remicade every 6 weeks for control of Crohns disease. Is not on ASA or blood thinners. Last dose of Remicade almost 6 weeks ago         Chief Complaint   Patient presents with    Anal Stricture               Review of patient's allergies indicates:   Allergen Reactions    Sulfa (sulfonamide antibiotics)         Other reaction(s): Rash         Current Medications          Current Outpatient Medications   Medication Sig Dispense Refill    bisacodyl (DULCOLAX) 5 mg EC tablet Take 5 mg by mouth daily as needed for Constipation.        cholecalciferol, vitamin D3, 5,000 unit capsule Take 2,000 Units by  mouth. 1 Capsule Oral Every day        cyanocobalamin 1,000 mcg/mL injection 1,000 mcg every 30 days.    1    fluticasone (FLONASE) 50 mcg/actuation nasal spray 1 spray by Each Nare route daily as needed.    0    INFLIXIMAB (REMICADE IV) Inject 10 mg/kg into the vein every 6 weeks.         inFLIXimab (REMICADE) 100 mg injection Inject into the vein.        mupirocin (BACTROBAN) 2 % ointment          predniSONE (DELTASONE) 10 MG tablet          tamoxifen (NOLVADEX) 20 MG Tab Take 20 mg by mouth once daily.        triamcinolone acetonide 0.1% (KENALOG) 0.1 % cream            No current facility-administered medications for this visit.                  Past Medical History:   Diagnosis Date    Allergic rhinitis      Anemia      Arthritis in Crohn's disease      Breast cancer dxed 4/2014 4/2014 diagnosed, Chemo/XRT completed Nov/Dec 2014, on tamoxifen    Colon polyp      Crohn's disease      Dysphagia, pharyngoesophageal phase      Ocular hypertension      Personal history of colonic polyps      Vitamin B deficiency              Past Surgical History:   Procedure Laterality Date    APPENDECTOMY        BREAST LUMPECTOMY Right      CHOLECYSTECTOMY        colon resections        COLON SURGERY   1972 or 1973, 1979    COLONOSCOPY        COLONOSCOPY N/A 10/24/2016     Procedure: COLONOSCOPY;  Surgeon: Jamie Polo MD;  Location: [address]);  Service: Endoscopy;  Laterality: N/A;    COLONOSCOPY N/A 8/29/2017     Procedure: COLONOSCOPY;  Surgeon: Diamond Barbour MD;  Location: [address]);  Service: Endoscopy;  Laterality: N/A;  schedule as 45 minute case    COLONOSCOPY N/A 10/10/2018     Procedure: COLONOSCOPY;  Surgeon: Diamond Barbour MD;  Location: [address]);  Service: Endoscopy;  Laterality: N/A;    COLONOSCOPY N/A 10/10/2018     Performed by Diamond Barbour MD at [address])    COLONOSCOPY N/A 8/29/2017     [facility] at [facility]  ENDO (4TH FLR)    COLONOSCOPY N/A 10/24/2016     Performed by Jamie [facility]o MD at [facility] ENDO (4TH FLR)    COLONOSCOPY N/A 4/7/2015     Performed by Jamie Polo MD at [facility] ENDO (4TH FLR)    EGD (ESOPHAGOGASTRODUODENOSCOPY) N/A 10/10/2018     Performed by Diamond Barbour MD at [facility] ENDO (4TH FLR)    EGD (ESOPHAGOGASTRODUODENOSCOPY) N/A 5/23/2013     Performed by Jamie Polo MD at [facility] ENDO (4TH FLR)    ESOPHAGOGASTRODUODENOSCOPY        ESOPHAGOGASTRODUODENOSCOPY N/A 10/10/2018     Procedure: EGD (ESOPHAGOGASTRODUODENOSCOPY);  Surgeon: Diamond Barbour MD;  Location: [facility] ENDO (4TH FLR);  Service: Endoscopy;  Laterality: N/A;    ESOPHAGOGASTRODUODENOSCOPY (EGD) N/A 10/24/2016     Performed by Jamie Polo MD at [facility] ENDO (4TH FLR)    fistula repairs        KNEE SURGERY   left    SMALL INTESTINE SURGERY         1997    UPPER GASTROINTESTINAL ENDOSCOPY                Family History   Problem Relation Age of Onset    Colon cancer Mother 60    Heart attack Father      Breast cancer Sister      Irritable bowel syndrome Sister      Heart disease Brother      Ovarian cancer Maternal Aunt      Stomach cancer Maternal Uncle 68    Heart disease Paternal Uncle      Pancreatic cancer Maternal Grandmother      Celiac disease Neg Hx      Cirrhosis Neg Hx      Colon polyps Neg Hx      Crohn's disease Neg Hx      Cystic fibrosis Neg Hx      Esophageal cancer Neg Hx      Hemochromatosis Neg Hx      Inflammatory bowel disease Neg Hx      Liver cancer Neg Hx      Liver disease Neg Hx      Rectal cancer Neg Hx      Ulcerative colitis Neg Hx      Gadiel's disease Neg Hx        Social History           Tobacco Use    Smoking status: Never Smoker    Smokeless tobacco: Never Used   Substance Use Topics    Alcohol use: No    Drug use: No         Review of Systems:  Review of Systems   Constitutional: Negative for chills, fatigue, fever and unexpected weight change.   Respiratory:  "Negative for cough and shortness of breath.    Cardiovascular: Negative for chest pain and palpitations.   Gastrointestinal: Positive for constipation (improved over past few weeks). Negative for abdominal distention, abdominal pain, blood in stool, diarrhea, nausea and vomiting.   Musculoskeletal: Negative for arthralgias and myalgias.   Skin: Negative for color change and rash.   Neurological: Negative for seizures and headaches.   Hematological: Does not bruise/bleed easily.   Psychiatric/Behavioral: Negative for confusion. The patient is not nervous/anxious.          OBJECTIVE:      Vital Signs (Most Recent)  Pulse: 94 (11/26/18 1316)  BP: (!) 157/63 (11/26/18 1316)  5' 6" (1.676 m)  74 kg (163 lb 1.6 oz)      Physical Exam:  Physical Exam   Constitutional: She is oriented to person, place, and time. She appears well-developed and well-nourished. No distress.   HENT:   Head: Normocephalic and atraumatic.   Eyes: Conjunctivae are normal. Pupils are equal, round, and reactive to light.   Neck: Normal range of motion. Neck supple.   Cardiovascular: Normal rate and regular rhythm.   Pulmonary/Chest: Effort normal. No respiratory distress.   Abdominal: Soft. She exhibits no distension. There is no tenderness.   Musculoskeletal: Normal range of motion. She exhibits no edema.   Neurological: She is alert and oriented to person, place, and time.   Skin: Skin is warm and dry.   Psychiatric: She has a normal mood and affect. Her behavior is normal. Judgment normal.         Laboratory  Labs Reviewed.     Diagnostic Results:  Diagnostics Reviewed.   Colonoscopy (10/10/2018); Impression:             - Large perianal Crohn's skin tags with severe anal canal stenosis dilated with KARON.   - The entire examined colon is normal. Surveillance biopsies taken to rule out dysplasia   - Severe ilecolonic anastomotic stricture that was ulcerated and unable to traverse due to severity of stenosis.     ASSESSMENT/PLAN:      Ms. Mireles " Jonny is a 59 yo female who presents for evaluation of a severe ilecolonic anastomotic stricture noted on recent colonoscopic exam. Patient previously had issue with difficult to pass bowel movements and constipation. However, she now notes easy bowel movements after making dietary changes.     PLAN:  -Will plan for future EUA, anal biopsy as per GI recommendations  -scheduled for surgery 4/10/19     Preop teaching done to include:  Details explained about planned surgery.  Expected discharge to home same days as surgery.  NPO after MN.    Advised on Pre op process: use of ASHWINI hose and SCD;s. IV and IVF.   Advised on Post op process: Expected wound care, use of oral pain medication, must have adult drive home and follow up appt in 2 weeks    All questions by pt and family answered to their satisfaction.    Admission paperwork was accomplished including operative consent and consent for blood and blood product administration.    The procedure was explained to the patient including indications, risks and alternatives. The patient verbalized understanding and has given informed consent.    Plan:  Proceed with operative procedure as planned.    Follow Up:  After hospitalization.

## 2019-04-09 NOTE — PROGRESS NOTES
History & Physical     SUBJECTIVE:      History of Present Illness:  Patient is a 58 y.o. female with PMHx significant for Crohns Disease, anemia, allergic rhinitis, arthritis, dysphagia, and ocular HTN who presents for evaluation of a severe ilecolonic anastomotic stricture noted on recent colonoscopic exam. Last seen Nov 2018. Patient notes that at the beginning of October she was having problems with constipation, such that although she was having regular bowel movements she was having to strain and work much harder to pass stool compared to usual. She notes that at the beginning of this time period she was taking dulcolax which helped some, but then she transitioned to drinking prune juice and eating prunes which helped regular her bowel movements and helped make her bowel movements more soft. She now no longer has issues with constipation or difficult bowel movements. She denies BRBPR, abdominal pain or cramping. Also denies fevers, chills, or recent unintentional weight loss. Does note a weight gain of 20 lbs over the past year, but states she has already had a workup by her endocrinologist to look for source of weight gain which has turned up negative. States she has had a personal history of external hemorrhoids and anal fissures but neigther have given her much issue over the past few months. Is currently on Remicade every 6 weeks for control of Crohns disease. Is not on ASA or blood thinners. Last dose of Remicade almost 6 weeks ago         Chief Complaint   Patient presents with    Anal Stricture               Review of patient's allergies indicates:   Allergen Reactions    Sulfa (sulfonamide antibiotics)         Other reaction(s): Rash         Current Medications          Current Outpatient Medications   Medication Sig Dispense Refill    bisacodyl (DULCOLAX) 5 mg EC tablet Take 5 mg by mouth daily as needed for Constipation.        cholecalciferol, vitamin D3, 5,000 unit capsule Take 2,000 Units by  mouth. 1 Capsule Oral Every day        cyanocobalamin 1,000 mcg/mL injection 1,000 mcg every 30 days.    1    fluticasone (FLONASE) 50 mcg/actuation nasal spray 1 spray by Each Nare route daily as needed.    0    INFLIXIMAB (REMICADE IV) Inject 10 mg/kg into the vein every 6 weeks.         inFLIXimab (REMICADE) 100 mg injection Inject into the vein.        mupirocin (BACTROBAN) 2 % ointment          predniSONE (DELTASONE) 10 MG tablet          tamoxifen (NOLVADEX) 20 MG Tab Take 20 mg by mouth once daily.        triamcinolone acetonide 0.1% (KENALOG) 0.1 % cream            No current facility-administered medications for this visit.                  Past Medical History:   Diagnosis Date    Allergic rhinitis      Anemia      Arthritis in Crohn's disease      Breast cancer dxed 4/2014 4/2014 diagnosed, Chemo/XRT completed Nov/Dec 2014, on tamoxifen    Colon polyp      Crohn's disease      Dysphagia, pharyngoesophageal phase      Ocular hypertension      Personal history of colonic polyps      Vitamin B deficiency              Past Surgical History:   Procedure Laterality Date    APPENDECTOMY        BREAST LUMPECTOMY Right      CHOLECYSTECTOMY        colon resections        COLON SURGERY   1972 or 1973, 1979    COLONOSCOPY        COLONOSCOPY N/A 10/24/2016     Procedure: COLONOSCOPY;  Surgeon: Jamie Polo MD;  Location: [address]);  Service: Endoscopy;  Laterality: N/A;    COLONOSCOPY N/A 8/29/2017     Procedure: COLONOSCOPY;  Surgeon: Diamond Barbour MD;  Location: [address]);  Service: Endoscopy;  Laterality: N/A;  schedule as 45 minute case    COLONOSCOPY N/A 10/10/2018     Procedure: COLONOSCOPY;  Surgeon: Diamond Barbour MD;  Location: [address]);  Service: Endoscopy;  Laterality: N/A;    COLONOSCOPY N/A 10/10/2018     Performed by Diamond Barbour MD at [address])    COLONOSCOPY N/A 8/29/2017     Performed by [facility] MD at [facility]  ENDO (4TH FLR)    COLONOSCOPY N/A 10/24/2016     Performed by Jamie [facility]o MD at [facility] ENDO (4TH FLR)    COLONOSCOPY N/A 4/7/2015     Performed by Jamie Polo MD at [facility] ENDO (4TH FLR)    EGD (ESOPHAGOGASTRODUODENOSCOPY) N/A 10/10/2018     Performed by Diamond Barbour MD at [facility] ENDO (4TH FLR)    EGD (ESOPHAGOGASTRODUODENOSCOPY) N/A 5/23/2013     Performed by Jamie Polo MD at [facility] ENDO (4TH FLR)    ESOPHAGOGASTRODUODENOSCOPY        ESOPHAGOGASTRODUODENOSCOPY N/A 10/10/2018     Procedure: EGD (ESOPHAGOGASTRODUODENOSCOPY);  Surgeon: Diamond Barbour MD;  Location: [facility] ENDO (4TH FLR);  Service: Endoscopy;  Laterality: N/A;    ESOPHAGOGASTRODUODENOSCOPY (EGD) N/A 10/24/2016     Performed by Jamie Polo MD at [facility] ENDO (4TH FLR)    fistula repairs        KNEE SURGERY   left    SMALL INTESTINE SURGERY         1997    UPPER GASTROINTESTINAL ENDOSCOPY                Family History   Problem Relation Age of Onset    Colon cancer Mother 60    Heart attack Father      Breast cancer Sister      Irritable bowel syndrome Sister      Heart disease Brother      Ovarian cancer Maternal Aunt      Stomach cancer Maternal Uncle 68    Heart disease Paternal Uncle      Pancreatic cancer Maternal Grandmother      Celiac disease Neg Hx      Cirrhosis Neg Hx      Colon polyps Neg Hx      Crohn's disease Neg Hx      Cystic fibrosis Neg Hx      Esophageal cancer Neg Hx      Hemochromatosis Neg Hx      Inflammatory bowel disease Neg Hx      Liver cancer Neg Hx      Liver disease Neg Hx      Rectal cancer Neg Hx      Ulcerative colitis Neg Hx      Gadiel's disease Neg Hx        Social History           Tobacco Use    Smoking status: Never Smoker    Smokeless tobacco: Never Used   Substance Use Topics    Alcohol use: No    Drug use: No         Review of Systems:  Review of Systems   Constitutional: Negative for chills, fatigue, fever and unexpected weight change.   Respiratory:  "Negative for cough and shortness of breath.    Cardiovascular: Negative for chest pain and palpitations.   Gastrointestinal: Positive for constipation (improved over past few weeks). Negative for abdominal distention, abdominal pain, blood in stool, diarrhea, nausea and vomiting.   Musculoskeletal: Negative for arthralgias and myalgias.   Skin: Negative for color change and rash.   Neurological: Negative for seizures and headaches.   Hematological: Does not bruise/bleed easily.   Psychiatric/Behavioral: Negative for confusion. The patient is not nervous/anxious.          OBJECTIVE:      Vital Signs (Most Recent)  Pulse: 94 (11/26/18 1316)  BP: (!) 157/63 (11/26/18 1316)  5' 6" (1.676 m)  74 kg (163 lb 1.6 oz)      Physical Exam:  Physical Exam   Constitutional: She is oriented to person, place, and time. She appears well-developed and well-nourished. No distress.   HENT:   Head: Normocephalic and atraumatic.   Eyes: Conjunctivae are normal. Pupils are equal, round, and reactive to light.   Neck: Normal range of motion. Neck supple.   Cardiovascular: Normal rate and regular rhythm.   Pulmonary/Chest: Effort normal. No respiratory distress.   Abdominal: Soft. She exhibits no distension. There is no tenderness.   Musculoskeletal: Normal range of motion. She exhibits no edema.   Neurological: She is alert and oriented to person, place, and time.   Skin: Skin is warm and dry.   Psychiatric: She has a normal mood and affect. Her behavior is normal. Judgment normal.         Laboratory  Labs Reviewed.     Diagnostic Results:  Diagnostics Reviewed.   Colonoscopy (10/10/2018); Impression:             - Large perianal Crohn's skin tags with severe anal canal stenosis dilated with KARON.   - The entire examined colon is normal. Surveillance biopsies taken to rule out dysplasia   - Severe ilecolonic anastomotic stricture that was ulcerated and unable to traverse due to severity of stenosis.     ASSESSMENT/PLAN:      Ms. Mireles " Jonny is a 57 yo female who presents for evaluation of a severe ilecolonic anastomotic stricture noted on recent colonoscopic exam. Patient previously had issue with difficult to pass bowel movements and constipation. However, she now notes easy bowel movements after making dietary changes.     PLAN:  -Will plan for future EUA, anal biopsy as per GI recommendations  -scheduled for surgery 4/10/19     Preop teaching done to include:  Details explained about planned surgery.  Expected discharge to home same days as surgery.  NPO after MN.    Advised on Pre op process: use of ASHWINI hose and SCD;s. IV and IVF.   Advised on Post op process: Expected wound care, use of oral pain medication, must have adult drive home and follow up appt in 2 weeks    All questions by pt and family answered to their satisfaction.    Admission paperwork was accomplished including operative consent and consent for blood and blood product administration.    The procedure was explained to the patient including indications, risks and alternatives. The patient verbalized understanding and has given informed consent.    Plan:  Proceed with operative procedure as planned.    Follow Up:  After hospitalization.

## 2019-04-10 ENCOUNTER — ANESTHESIA (OUTPATIENT)
Dept: SURGERY | Facility: HOSPITAL | Age: 59
End: 2019-04-10
Payer: COMMERCIAL

## 2019-04-10 ENCOUNTER — HOSPITAL ENCOUNTER (OUTPATIENT)
Facility: HOSPITAL | Age: 59
Discharge: HOME | End: 2019-04-10
Attending: COLON & RECTAL SURGERY | Admitting: COLON & RECTAL SURGERY
Payer: COMMERCIAL

## 2019-04-10 VITALS
RESPIRATION RATE: 16 BRPM | SYSTOLIC BLOOD PRESSURE: 136 MMHG | DIASTOLIC BLOOD PRESSURE: 74 MMHG | BODY MASS INDEX: 27 KG/M2 | HEIGHT: 66 IN | WEIGHT: 168 LBS | HEART RATE: 85 BPM | TEMPERATURE: 98 F | OXYGEN SATURATION: 98 %

## 2019-04-10 DIAGNOSIS — K62.4 ANAL STRICTURE: Primary | ICD-10-CM

## 2019-04-10 DIAGNOSIS — K50.812 CROHN'S DISEASE OF BOTH SMALL AND LARGE INTESTINE WITH INTESTINAL OBSTRUCTION: ICD-10-CM

## 2019-04-10 PROCEDURE — 63600175 PHARM REV CODE 636 W HCPCS: Performed by: COLON & RECTAL SURGERY

## 2019-04-10 PROCEDURE — D9220A PRA ANESTHESIA: ICD-10-PCS | Mod: ANES,,, | Performed by: ANESTHESIOLOGY

## 2019-04-10 PROCEDURE — 88305 TISSUE EXAM BY PATHOLOGIST: CPT | Performed by: PATHOLOGY

## 2019-04-10 PROCEDURE — 37000008 HC ANESTHESIA 1ST 15 MINUTES: Performed by: COLON & RECTAL SURGERY

## 2019-04-10 PROCEDURE — 25000003 PHARM REV CODE 250: Performed by: COLON & RECTAL SURGERY

## 2019-04-10 PROCEDURE — 45905 DILATION OF ANAL SPHINCTER: CPT | Mod: ,,, | Performed by: COLON & RECTAL SURGERY

## 2019-04-10 PROCEDURE — 63600175 PHARM REV CODE 636 W HCPCS: Performed by: NURSE ANESTHETIST, CERTIFIED REGISTERED

## 2019-04-10 PROCEDURE — 71000039 HC RECOVERY, EACH ADD'L HOUR: Performed by: COLON & RECTAL SURGERY

## 2019-04-10 PROCEDURE — 88305 TISSUE EXAM BY PATHOLOGIST: CPT | Mod: 26,,, | Performed by: PATHOLOGY

## 2019-04-10 PROCEDURE — 27000221 HC OXYGEN, UP TO 24 HOURS

## 2019-04-10 PROCEDURE — C9290 INJ, BUPIVACAINE LIPOSOME: HCPCS | Performed by: COLON & RECTAL SURGERY

## 2019-04-10 PROCEDURE — D9220A PRA ANESTHESIA: Mod: ANES,,, | Performed by: ANESTHESIOLOGY

## 2019-04-10 PROCEDURE — 63600175 PHARM REV CODE 636 W HCPCS: Performed by: ANESTHESIOLOGY

## 2019-04-10 PROCEDURE — 46999 PR BIOPSY OF ANAL/PERIANAL LESIONS: ICD-10-PCS | Mod: ,,, | Performed by: COLON & RECTAL SURGERY

## 2019-04-10 PROCEDURE — 25000003 PHARM REV CODE 250: Performed by: NURSE PRACTITIONER

## 2019-04-10 PROCEDURE — 25000003 PHARM REV CODE 250: Performed by: SURGERY

## 2019-04-10 PROCEDURE — D9220A PRA ANESTHESIA: Mod: CRNA,,, | Performed by: NURSE ANESTHETIST, CERTIFIED REGISTERED

## 2019-04-10 PROCEDURE — 71000033 HC RECOVERY, INTIAL HOUR: Performed by: COLON & RECTAL SURGERY

## 2019-04-10 PROCEDURE — 37000009 HC ANESTHESIA EA ADD 15 MINS: Performed by: COLON & RECTAL SURGERY

## 2019-04-10 PROCEDURE — 45905 PR DILATION ANAL SPHINCTER W ANESTH: ICD-10-PCS | Mod: ,,, | Performed by: COLON & RECTAL SURGERY

## 2019-04-10 PROCEDURE — 36000706: Performed by: COLON & RECTAL SURGERY

## 2019-04-10 PROCEDURE — 71000015 HC POSTOP RECOV 1ST HR: Performed by: COLON & RECTAL SURGERY

## 2019-04-10 PROCEDURE — 46999 UNLISTED PROCEDURE ANUS: CPT | Mod: ,,, | Performed by: COLON & RECTAL SURGERY

## 2019-04-10 PROCEDURE — D9220A PRA ANESTHESIA: ICD-10-PCS | Mod: CRNA,,, | Performed by: NURSE ANESTHETIST, CERTIFIED REGISTERED

## 2019-04-10 PROCEDURE — 88305 TISSUE SPECIMEN TO PATHOLOGY - SURGERY: ICD-10-PCS | Mod: 26,,, | Performed by: PATHOLOGY

## 2019-04-10 PROCEDURE — 25000003 PHARM REV CODE 250: Performed by: NURSE ANESTHETIST, CERTIFIED REGISTERED

## 2019-04-10 PROCEDURE — 36000707: Performed by: COLON & RECTAL SURGERY

## 2019-04-10 RX ORDER — ONDANSETRON 2 MG/ML
INJECTION INTRAMUSCULAR; INTRAVENOUS
Status: DISCONTINUED | OUTPATIENT
Start: 2019-04-10 | End: 2019-04-10

## 2019-04-10 RX ORDER — OXYCODONE AND ACETAMINOPHEN 5; 325 MG/1; MG/1
1 TABLET ORAL EVERY 4 HOURS PRN
Qty: 21 TABLET | Refills: 0 | Status: SHIPPED | OUTPATIENT
Start: 2019-04-10 | End: 2019-07-19

## 2019-04-10 RX ORDER — SODIUM CHLORIDE 9 MG/ML
INJECTION, SOLUTION INTRAVENOUS CONTINUOUS
Status: DISCONTINUED | OUTPATIENT
Start: 2019-04-10 | End: 2020-12-14

## 2019-04-10 RX ORDER — SODIUM CHLORIDE 9 MG/ML
INJECTION, SOLUTION INTRAVENOUS CONTINUOUS
Status: DISCONTINUED | OUTPATIENT
Start: 2019-04-10 | End: 2019-04-10 | Stop reason: HOSPADM

## 2019-04-10 RX ORDER — POLYETHYLENE GLYCOL 3350 17 G/17G
17 POWDER, FOR SOLUTION ORAL DAILY
Qty: 30 EACH | Refills: 0 | Status: SHIPPED | OUTPATIENT
Start: 2019-04-10 | End: 2019-07-19

## 2019-04-10 RX ORDER — OXYCODONE AND ACETAMINOPHEN 5; 325 MG/1; MG/1
1 TABLET ORAL EVERY 4 HOURS PRN
Status: DISCONTINUED | OUTPATIENT
Start: 2019-04-10 | End: 2019-04-10 | Stop reason: HOSPADM

## 2019-04-10 RX ORDER — BUPIVACAINE HYDROCHLORIDE 2.5 MG/ML
INJECTION, SOLUTION EPIDURAL; INFILTRATION; INTRACAUDAL
Status: DISCONTINUED | OUTPATIENT
Start: 2019-04-10 | End: 2019-04-10 | Stop reason: HOSPADM

## 2019-04-10 RX ORDER — MEPERIDINE HYDROCHLORIDE 50 MG/ML
12.5 INJECTION INTRAMUSCULAR; INTRAVENOUS; SUBCUTANEOUS ONCE AS NEEDED
Status: DISCONTINUED | OUTPATIENT
Start: 2019-04-10 | End: 2019-04-10 | Stop reason: HOSPADM

## 2019-04-10 RX ORDER — MUPIROCIN 20 MG/G
OINTMENT TOPICAL
Status: DISPENSED | OUTPATIENT
Start: 2019-04-10

## 2019-04-10 RX ORDER — MIDAZOLAM HYDROCHLORIDE 1 MG/ML
INJECTION, SOLUTION INTRAMUSCULAR; INTRAVENOUS
Status: DISCONTINUED | OUTPATIENT
Start: 2019-04-10 | End: 2019-04-10

## 2019-04-10 RX ORDER — HYDROMORPHONE HYDROCHLORIDE 1 MG/ML
0.2 INJECTION, SOLUTION INTRAMUSCULAR; INTRAVENOUS; SUBCUTANEOUS EVERY 5 MIN PRN
Status: DISCONTINUED | OUTPATIENT
Start: 2019-04-10 | End: 2019-04-10 | Stop reason: HOSPADM

## 2019-04-10 RX ORDER — LIDOCAINE HYDROCHLORIDE 10 MG/ML
1 INJECTION, SOLUTION EPIDURAL; INFILTRATION; INTRACAUDAL; PERINEURAL ONCE
Status: DISPENSED | OUTPATIENT
Start: 2019-04-10

## 2019-04-10 RX ORDER — LIDOCAINE HCL/PF 100 MG/5ML
SYRINGE (ML) INTRAVENOUS
Status: DISCONTINUED | OUTPATIENT
Start: 2019-04-10 | End: 2019-04-10

## 2019-04-10 RX ORDER — LORAZEPAM 2 MG/ML
0.25 INJECTION INTRAMUSCULAR ONCE AS NEEDED
Status: DISCONTINUED | OUTPATIENT
Start: 2019-04-10 | End: 2019-04-10

## 2019-04-10 RX ORDER — FENTANYL CITRATE 50 UG/ML
INJECTION, SOLUTION INTRAMUSCULAR; INTRAVENOUS
Status: DISCONTINUED | OUTPATIENT
Start: 2019-04-10 | End: 2019-04-10

## 2019-04-10 RX ORDER — PROPOFOL 10 MG/ML
VIAL (ML) INTRAVENOUS
Status: DISCONTINUED | OUTPATIENT
Start: 2019-04-10 | End: 2019-04-10

## 2019-04-10 RX ORDER — KETAMINE HYDROCHLORIDE 10 MG/ML
INJECTION, SOLUTION INTRAMUSCULAR; INTRAVENOUS
Status: DISCONTINUED | OUTPATIENT
Start: 2019-04-10 | End: 2019-04-10

## 2019-04-10 RX ORDER — SUCCINYLCHOLINE CHLORIDE 20 MG/ML
INJECTION INTRAMUSCULAR; INTRAVENOUS
Status: DISCONTINUED | OUTPATIENT
Start: 2019-04-10 | End: 2019-04-10

## 2019-04-10 RX ADMIN — HYDROMORPHONE HYDROCHLORIDE 0.2 MG: 1 INJECTION, SOLUTION INTRAMUSCULAR; INTRAVENOUS; SUBCUTANEOUS at 09:04

## 2019-04-10 RX ADMIN — FENTANYL CITRATE 100 MCG: 50 INJECTION, SOLUTION INTRAMUSCULAR; INTRAVENOUS at 08:04

## 2019-04-10 RX ADMIN — MUPIROCIN: 20 OINTMENT TOPICAL at 06:04

## 2019-04-10 RX ADMIN — MIDAZOLAM HYDROCHLORIDE 2 MG: 1 INJECTION, SOLUTION INTRAMUSCULAR; INTRAVENOUS at 07:04

## 2019-04-10 RX ADMIN — KETAMINE HYDROCHLORIDE 20 MG: 10 INJECTION, SOLUTION INTRAMUSCULAR; INTRAVENOUS at 08:04

## 2019-04-10 RX ADMIN — SUCCINYLCHOLINE CHLORIDE 180 MG: 20 INJECTION, SOLUTION INTRAMUSCULAR; INTRAVENOUS at 08:04

## 2019-04-10 RX ADMIN — SODIUM CHLORIDE: 0.9 INJECTION, SOLUTION INTRAVENOUS at 06:04

## 2019-04-10 RX ADMIN — OXYCODONE HYDROCHLORIDE AND ACETAMINOPHEN 1 TABLET: 5; 325 TABLET ORAL at 09:04

## 2019-04-10 RX ADMIN — ONDANSETRON 4 MG: 2 INJECTION INTRAMUSCULAR; INTRAVENOUS at 08:04

## 2019-04-10 RX ADMIN — SODIUM CHLORIDE, SODIUM GLUCONATE, SODIUM ACETATE, POTASSIUM CHLORIDE, MAGNESIUM CHLORIDE, SODIUM PHOSPHATE, DIBASIC, AND POTASSIUM PHOSPHATE: .53; .5; .37; .037; .03; .012; .00082 INJECTION, SOLUTION INTRAVENOUS at 08:04

## 2019-04-10 RX ADMIN — PROPOFOL 200 MG: 10 INJECTION, EMULSION INTRAVENOUS at 08:04

## 2019-04-10 RX ADMIN — LIDOCAINE HYDROCHLORIDE 60 MG: 20 INJECTION, SOLUTION INTRAVENOUS at 08:04

## 2019-04-10 NOTE — PROGRESS NOTES
Notified Dr. Reyes patient states she drank pineapple orange juice with no pulp at 0500. Okay to proceed.

## 2019-04-10 NOTE — PLAN OF CARE
Discharge instructions given and explained to patient and family with verbalization of understanding all instructions. Prescriptions sent to patients pharmacy per MD and explained next time and doses of each medication. Patients v/s stable, denies n/v and tolerating po, rates pain level tolerable, IV removed, and family at bedside for patient discharge home.

## 2019-04-10 NOTE — INTERVAL H&P NOTE
The patient has been examined and the H&P has been reviewed:    I concur with the findings and no changes have occurred since H&P was written.    Anesthesia/Surgery risks, benefits and alternative options discussed and understood by patient/family.          Active Hospital Problems    Diagnosis  POA    Anal stricture [K62.4]  Yes      Resolved Hospital Problems   No resolved problems to display.

## 2019-04-10 NOTE — TRANSFER OF CARE
"Anesthesia Transfer of Care Note    Patient: Aline Fuller    Procedure(s) Performed: Procedure(s) (LRB):  Exam under anesthesia botox (Left)  BIOPSY, ANUS  DILATATION    Patient location: PACU    Anesthesia Type: general    Transport from OR: Transported from OR on 6-10 L/min O2 by face mask with adequate spontaneous ventilation    Post assessment: no apparent anesthetic complications    Post vital signs: stable    Level of consciousness: sedated and responds to stimulation    Nausea/Vomiting: no nausea/vomiting    Complications: none    Transfer of care protocol was followed      Last vitals:   Visit Vitals  BP (!) 176/102   Pulse 92   Temp 36.4 °C (97.5 °F) (Temporal)   Resp 20   Ht 5' 6" (1.676 m)   Wt 76.2 kg (168 lb)   SpO2 100%   Breastfeeding? No   BMI 27.12 kg/m²     "

## 2019-04-10 NOTE — ANESTHESIA POSTPROCEDURE EVALUATION
Anesthesia Post Evaluation    Patient: Aline Fuller    Procedure(s) Performed: Procedure(s) (LRB):  Exam under anesthesia botox (Left)  BIOPSY, ANUS  DILATATION    Final Anesthesia Type: general  Patient location during evaluation: PACU  Patient participation: Yes- Able to Participate  Level of consciousness: awake and alert  Post-procedure vital signs: reviewed and stable  Pain management: adequate  Airway patency: patent  PONV status at discharge: No PONV  Anesthetic complications: no      Cardiovascular status: blood pressure returned to baseline  Respiratory status: spontaneous ventilation and room air  Hydration status: euvolemic  Follow-up not needed.          Vitals Value Taken Time   /75 4/10/2019 10:17 AM   Temp 36.4 °C (97.5 °F) 4/10/2019  8:51 AM   Pulse 83 4/10/2019 10:17 AM   Resp 12 4/10/2019 10:17 AM   SpO2 92 % 4/10/2019 10:17 AM   Vitals shown include unvalidated device data.      No case tracking events are documented in the log.      Pain/Jil Score: Pain Rating Prior to Med Admin: 6 (4/10/2019  9:40 AM)  Pain Rating Post Med Admin: 5 (4/10/2019 10:00 AM)  Jil Score: 7 (4/10/2019  8:55 AM)

## 2019-04-10 NOTE — PROGRESS NOTES
"Called Dr. Aparicio's office and spoke to  regarding Oxycodone prescription left in chart.  states "will relay message to Dr. Aparicio's nurse."  "

## 2019-04-10 NOTE — DISCHARGE SUMMARY
[facility]  Brief Operative Note     SUMMARY     Surgery Date: 4/10/2019     Surgeon(s) and Role:     * Rhett Aparicio MD - Primary     * Blanca Hernandez MD - Resident - Assisting        Pre-op Diagnosis:  Anal stricture [K62.4]    Post-op Diagnosis:  Post-Op Diagnosis Codes:     * Anal stricture [K62.4]    Procedure(s) (LRB):  Exam under anesthesia botox (Left)  BIOPSY, ANUS  DILATATION    Anesthesia: General    Description of the findings of the procedure: digital dilation anal stricture, random biopsies anal canal, gel foam placed in rectum, perianal block with Exparel/marcaine    Findings/Key Components: see op note    Estimated Blood Loss: 20 mL         Specimens:   Specimen (12h ago, onward)    Start     Ordered    04/10/19 0844  Specimen to Pathology - Surgery  Once     Comments:  1. Anal Biopsy - Crohn's disease - Rule out Diplasia - Permanent     Start Status     04/10/19 0844 Collected (04/10/19 0844) Order ID: [id number]       04/10/19 0844          Discharge Note    SUMMARY     Admit Date: 4/10/2019    Discharge Date and Time:  04/10/2019 8:44 AM    Hospital Course (synopsis of major diagnoses, care, treatment, and services provided during the course of the hospital stay): 57 yo F here for outpatient procedure She tolerated the procedure well and was discharged to home.      Final Diagnosis: Post-Op Diagnosis Codes:     * Anal stricture [K62.4]    Disposition: Home or Self Care    Follow Up/Patient Instructions:     Medications:  Reconciled Home Medications:      Medication List      START taking these medications    oxyCODONE-acetaminophen 5-325 mg per tablet  Commonly known as:  PERCOCET  Take 1 tablet by mouth every 4 (four) hours as needed for Pain.     polyethylene glycol 17 gram Pwpk  Commonly known as:  GLYCOLAX  Take 17 g by mouth once daily.        CONTINUE taking these medications    bisacodyl 5 mg EC tablet  Commonly known as:  DULCOLAX  Take 5 mg by mouth daily as needed  for Constipation.     cholecalciferol (vitamin D3) 5,000 unit capsule  Take 2,000 Units by mouth. 1 Capsule Oral Every day     cyanocobalamin 1,000 mcg/mL injection  1,000 mcg every 30 days.     fluticasone 50 mcg/actuation nasal spray  Commonly known as:  FLONASE  1 spray by Each Nare route daily as needed.     mupirocin 2 % ointment  Commonly known as:  BACTROBAN     predniSONE 10 MG tablet  Commonly known as:  DELTASONE     * REMICADE IV  Inject 10 mg/kg into the vein every 6 weeks.     * REMICADE 100 mg injection  Generic drug:  inFLIXimab  Inject into the vein.     tamoxifen 20 MG Tab  Commonly known as:  NOLVADEX  Take 20 mg by mouth every evening .     triamcinolone acetonide 0.1% 0.1 % cream  Commonly known as:  KENALOG         * This list has 2 medication(s) that are the same as other medications prescribed for you. Read the directions carefully, and ask your doctor or other care provider to review them with you.              Discharge Procedure Orders   Diet Adult Regular     Call MD for:  temperature >100.4     Call MD for:  persistent nausea and vomiting or diarrhea     Call MD for:  severe uncontrolled pain     Call MD for:  persistent dizziness, light-headedness, or visual disturbances     Call MD for:  increased confusion or weakness     No dressing needed   Order Comments: Gauze as needed for drainage. There is gauze in the rectum which you will pass with a bowel movement.     Activity as tolerated     Follow-up Information     Rhett Aparicio MD.    Specialty:  Colon and Rectal Surgery  Why:  As needed  Contact information:  [address] [facility] [address]  [phone]

## 2019-04-11 NOTE — OP NOTE
DATE OF PROCEDURE:  04/10/2019.    PREOPERATIVE DIAGNOSIS:  Perianal Crohn disease with stricturing.    POSTOPERATIVE DIAGNOSIS:  Perianal Crohn disease with stricturing.    PROCEDURE PERFORMED:  Anorectal examination under anesthesia with anal dilation   and anal biopsies.    SURGEON:  Rhett Aparicio M.D.    ASSISTANT:  Blanca Hernandez M.D. (RES).     ANESTHESIA:  General endotracheal.    IV FLUIDS:  1 L crystalloid.    ESTIMATED BLOOD LOSS:  Less than 10 mL.    DRAINS:  None.    SPECIMENS:  Anal canal biopsies to Pathology.    COMPLICATIONS:  None.    OPERATIVE FINDINGS:  Severe perianal Crohn's related changes with large   hypertrophic skin tags, healed fistula tracts, and marked stenosis at the anal   verge.    INDICATIONS:  The patient is a 58-year-old female with severe anorectal Crohn   disease with stricturing.  She presents today for examination under anesthesia   with dilation and biopsies to exclude dysplasia.    DESCRIPTION OF PROCEDURE:  The patient was identified, brought to the Operating   Room, placed on the table in a supine position after obtaining informed consent.    Venous sequential stockings were placed.  After induction of general   endotracheal anesthesia, she was repositioned on the operating table in a prone   position using chest rolls and adequate padding of all pressure points.  The   buttocks were taped apart to efface the anal verge and the perianal region was   prepped and draped in the usual sterile fashion.    On initial inspection, she had severe Crohn's related changes of the anus with   marked hypertrophic edematous skin tags surrounding the anal verge.  There were   multiple healed old fistula tracts present as well.  There was marked stenosis   at the level of the anal verge, which was gently dilated digitally until I was   able to insert my index finger completely.  Once we fully dilated the anal   stricture, we then evaluated the anal canal with lighted Parson anal  retractors.    The mucosa proximal to the stricture actually appeared to be quite normal.  We   then performed biopsies of the diseased, strictured mucosa at the anal verge   using tenotomy scissors.  Random biopsies were taken from the circumference of   the stenosis and sent to Pathology to be evaluated for dysplasia.    The anal canal was then irrigated.  Hemostasis was noted to be adequate.    Gelfoam gauze was placed within the anal canal.  An anal block was performed   using a combination of Exparel and 0.25% Marcaine for postoperative analgesia,   and sterile dressings were applied.    The patient tolerated the procedure well.  There were no complications.  She was   extubated in the Operating Room and taken to Recovery in satisfactory   condition.  All needle, instrument and sponge counts were correct at the end of   the case.      AZAR/IN  dd: 04/11/2019 06:45:01 (CDT)  td: 04/11/2019 07:10:42 (CDT)  Doc ID   #[id number]  Job ID #[id number]    CC:

## 2019-04-15 ENCOUNTER — TELEPHONE (OUTPATIENT)
Dept: SURGERY | Facility: CLINIC | Age: 59
End: 2019-04-15

## 2019-04-22 ENCOUNTER — TELEPHONE (OUTPATIENT)
Dept: SURGERY | Facility: CLINIC | Age: 59
End: 2019-04-22

## 2019-04-22 NOTE — TELEPHONE ENCOUNTER
Spoke with patient and informed her that th biopsies showed no dysplasia.  She does not need to schedule a follow-up/post-op appointment with Dr. Aparicio if she is doing well. He can see her back as needed.  She should continue to follow up with Dr. Barbour. Patient reports dull ache in the rectal area but is taking Tylenol as needed.

## 2019-04-22 NOTE — TELEPHONE ENCOUNTER
----- Message from Rhett Aparicio MD sent at 4/17/2019  1:11 PM CDT -----  Please let patient know that her biopsies showed no dysplasia.  She does not need to schedule a follow-up/post-op appointment with me if she is doing well. I can see her back as needed.  She should continue to follow up with Dr. Barbour.

## 2019-07-19 ENCOUNTER — OFFICE VISIT (OUTPATIENT)
Dept: GASTROENTEROLOGY | Facility: CLINIC | Age: 59
End: 2019-07-19
Payer: COMMERCIAL

## 2019-07-19 VITALS
HEIGHT: 66 IN | HEART RATE: 98 BPM | BODY MASS INDEX: 26.93 KG/M2 | DIASTOLIC BLOOD PRESSURE: 85 MMHG | SYSTOLIC BLOOD PRESSURE: 144 MMHG | WEIGHT: 167.56 LBS

## 2019-07-19 DIAGNOSIS — K50.819 CROHN'S DISEASE OF SMALL AND LARGE INTESTINES WITH COMPLICATION: Primary | ICD-10-CM

## 2019-07-19 DIAGNOSIS — R13.19 ESOPHAGEAL DYSPHAGIA: ICD-10-CM

## 2019-07-19 PROCEDURE — 99214 OFFICE O/P EST MOD 30 MIN: CPT | Mod: S$GLB,,, | Performed by: INTERNAL MEDICINE

## 2019-07-19 PROCEDURE — 99214 PR OFFICE/OUTPT VISIT, EST, LEVL IV, 30-39 MIN: ICD-10-PCS | Mod: S$GLB,,, | Performed by: INTERNAL MEDICINE

## 2019-07-19 PROCEDURE — 99999 PR PBB SHADOW E&M-EST. PATIENT-LVL III: ICD-10-PCS | Mod: PBBFAC,,, | Performed by: INTERNAL MEDICINE

## 2019-07-19 PROCEDURE — 99999 PR PBB SHADOW E&M-EST. PATIENT-LVL III: CPT | Mod: PBBFAC,,, | Performed by: INTERNAL MEDICINE

## 2019-07-19 NOTE — PROGRESS NOTES
REASON FOR VISIT:  Fistulizing Crohn disease.     HPI:   Ms. Fuller has longstanding history of perianal Crohn's disease.  She has been doing really   well clinically since she was transitioned from 5 mg/kg body weight every six to   10 mg/kg body weight every six weeks.  This titration was based on infliximab   titers and negative antibodies.   October 2018 she had repeat infliximab levels with antibodies revealing steady levels of infliximab trough with no antibodies.  She has not been having any perianal fistula   drainage.  Her bowels have been moving better, although she was having some   constipation which she manages with increased water intake.  She is not currently using MiraLax.  She underwent an exam under anesthesia with Dr. Aparicio and an anal dilation was done and anal biopsies were negative for dysplasia.    She does have intermittent solid food dysphagia and has previously undergone esophageal dilation in 2018.  Today, we discussed about   proceeding with a repeat upper endoscopy with dilation and colonoscopy for   surveillance towards the end of 2019 with Dr. Barbour.  She will be getting an anal dilation at the same time.  She does   have chronic fatigue and anemia of chronic disease with her hemoglobin being   stable.  She follows up with her primary care physician who keeps up with her influenza and pneumococcal vaccines.  Her endocrinologist monitors her vitamin-D level.  She gets q.6 months eye exams and has no history of iritis or uveitis.  She has also been seen dermatologist once a year with no evidence of skin cancer.     PAST MEDICAL, SURGICAL, SOCIAL AND FAMILY HISTORY:  Reviewed.     MEDICATIONS AND ALLERGIES:  Reviewed.     REVIEW OF SYSTEMS:  CONSTITUTIONAL:  No fever, no chills.  Weight gain.  Appetite is normal.  EYES:  No visual changes, no red, painful eyes.  ENT:  No odynophagia or hoarseness of voice.  Does have ear fullness bilaterally   and is seeing a local ENT  physician.  CARDIOVASCULAR:  No angina or palpitation.  RESPIRATORY:  No shortness of breath or wheezing.  GENITOURINARY:  No dysuria.  MUSCULOSKELETAL:  No myalgias.  Some arthralgia around her knees and ankle.  SKIN:  No pruritus or eczema.  NEUROLOGIC:  No headache, no seizures.  PSYCHIATRIC:  No anxiety or depression.  GASTROINTESTINAL:  See HPI.  No blood in the stool.     PHYSICAL EXAMINATION:  VITAL SIGNS:  See EPIC.  GENERAL:  Awake, alert and oriented x3, no acute distress.  NECK:  Supple, no carotid bruit.  No cervical adenopathy.  ABDOMEN:  Obese, soft, nontender, nondistended.  No masses palpable.  No   hepatosplenomegaly appreciated.  Bowel sounds are normal.  No abdominal bruits   heard.  EYES:  Conjunctivae anicteric.  ENT:  Oropharynx, mucosa moist.  Mallampati 3.  CARDIOVASCULAR:  S1, S2 normal.  RESPIRATORY:  Bilateral air entry equal.  SKIN:  No palmar erythema or spider angioma.  NEUROLOGIC:  No asterixis.  PSYCHIATRY:  Affect appropriate.  LOWER EXTREMITY:  No pedal edema.     IMPRESSION:  1.  Perianal Crohn disease - currently inactive with anal stenosis (s/p dilation under anesthesia) and stenosis   at the ileocolic anastomosis (stable per recent MRE) in clinical remission on   Remicade 10 mg/kg every six weeks.  2.  Intermittent solid food dysphagia.       RECOMMENDATIONS:  1.  Schedule EGD and colonoscopy in fall.  2. QuantiFERON Gold TB test, CRP level, CBC and CMP.

## 2019-08-19 ENCOUNTER — TELEPHONE (OUTPATIENT)
Dept: GASTROENTEROLOGY | Facility: CLINIC | Age: 59
End: 2019-08-19

## 2019-08-19 NOTE — TELEPHONE ENCOUNTER
----- Message from Nicky Gupta sent at 8/19/2019 10:11 AM CDT -----  Contact: Savita Harris [phone]  .Needs Advice    Reason for call:        Communication Preference:phone    Additional Information:requesting pt chart notes would like an update on how pt is doing please fax chart notes below     Fax number [phone]

## 2019-10-03 ENCOUNTER — TELEPHONE (OUTPATIENT)
Dept: ENDOSCOPY | Facility: HOSPITAL | Age: 59
End: 2019-10-03

## 2019-10-03 NOTE — TELEPHONE ENCOUNTER
Called pt. To schedule EGD COLON. Awaiting her sister to give availability to come with her. Phone number provided.EC

## 2019-10-29 DIAGNOSIS — Z12.11 SPECIAL SCREENING FOR MALIGNANT NEOPLASMS, COLON: Primary | ICD-10-CM

## 2019-10-29 RX ORDER — POLYETHYLENE GLYCOL 3350, SODIUM SULFATE ANHYDROUS, SODIUM BICARBONATE, SODIUM CHLORIDE, POTASSIUM CHLORIDE 236; 22.74; 6.74; 5.86; 2.97 G/4L; G/4L; G/4L; G/4L; G/4L
4 POWDER, FOR SOLUTION ORAL ONCE
Qty: 4000 ML | Refills: 0 | Status: SHIPPED | OUTPATIENT
Start: 2019-10-29 | End: 2019-10-29

## 2019-11-06 NOTE — TELEPHONE ENCOUNTER
Pt called in and was transferred to me  She stated she was told she needed to contact us once her procedure was scheduled so we could schedule follow up on the same day.  Her procedure is for 10:00 on 08/29 so I scheduled her follow up same day at 1:00    Appoint reminder mailed     We also discussed the situation with her infusion and I told her I was working on getting it taking care of and I would be in touch once I knew more.  She expressed understanding and appreciated my help with this.       Purse String (Intermediate) Text: Given the location of the defect and the characteristics of the surrounding skin a purse string intermediate closure was deemed most appropriate.  Undermining was performed circumfirentially around the surgical defect.  A purse string suture was then placed and tightened.

## 2019-12-19 ENCOUNTER — ANESTHESIA EVENT (OUTPATIENT)
Dept: ENDOSCOPY | Facility: HOSPITAL | Age: 59
End: 2019-12-19
Payer: COMMERCIAL

## 2019-12-19 ENCOUNTER — HOSPITAL ENCOUNTER (OUTPATIENT)
Facility: HOSPITAL | Age: 59
Discharge: HOME | End: 2019-12-19
Attending: INTERNAL MEDICINE | Admitting: INTERNAL MEDICINE
Payer: COMMERCIAL

## 2019-12-19 ENCOUNTER — ANESTHESIA (OUTPATIENT)
Dept: ENDOSCOPY | Facility: HOSPITAL | Age: 59
End: 2019-12-19
Payer: COMMERCIAL

## 2019-12-19 VITALS
HEART RATE: 68 BPM | HEIGHT: 66 IN | BODY MASS INDEX: 26.84 KG/M2 | RESPIRATION RATE: 17 BRPM | OXYGEN SATURATION: 98 % | TEMPERATURE: 98 F | SYSTOLIC BLOOD PRESSURE: 177 MMHG | WEIGHT: 167 LBS | DIASTOLIC BLOOD PRESSURE: 82 MMHG

## 2019-12-19 DIAGNOSIS — R13.10 DYSPHAGIA, UNSPECIFIED TYPE: Primary | ICD-10-CM

## 2019-12-19 DIAGNOSIS — K50.819 CROHN'S DISEASE OF SMALL AND LARGE INTESTINES WITH COMPLICATION: ICD-10-CM

## 2019-12-19 DIAGNOSIS — K50.812 CROHN'S DISEASE OF BOTH SMALL AND LARGE INTESTINE WITH INTESTINAL OBSTRUCTION: ICD-10-CM

## 2019-12-19 DIAGNOSIS — B37.81 ESOPHAGEAL CANDIDIASIS: Primary | ICD-10-CM

## 2019-12-19 PROCEDURE — E9220 PRA ENDO ANESTHESIA: HCPCS | Mod: ,,, | Performed by: NURSE ANESTHETIST, CERTIFIED REGISTERED

## 2019-12-19 PROCEDURE — 45378 DIAGNOSTIC COLONOSCOPY: CPT | Performed by: INTERNAL MEDICINE

## 2019-12-19 PROCEDURE — 25000003 PHARM REV CODE 250: Performed by: NURSE ANESTHETIST, CERTIFIED REGISTERED

## 2019-12-19 PROCEDURE — C1726 CATH, BAL DIL, NON-VASCULAR: HCPCS | Performed by: INTERNAL MEDICINE

## 2019-12-19 PROCEDURE — 43220 PR ESOPHAGOSCOPY,FLEXIBLE, TRANSORAL, DILATION,<30MM: ICD-10-PCS | Mod: 51,,, | Performed by: INTERNAL MEDICINE

## 2019-12-19 PROCEDURE — 63600175 PHARM REV CODE 636 W HCPCS: Performed by: NURSE ANESTHETIST, CERTIFIED REGISTERED

## 2019-12-19 PROCEDURE — 43220 ESOPHAGOSCOPY BALLOON <30MM: CPT | Performed by: INTERNAL MEDICINE

## 2019-12-19 PROCEDURE — 63600175 PHARM REV CODE 636 W HCPCS: Performed by: NURSE PRACTITIONER

## 2019-12-19 PROCEDURE — 43220 ESOPHAGOSCOPY BALLOON <30MM: CPT | Mod: 51,,, | Performed by: INTERNAL MEDICINE

## 2019-12-19 PROCEDURE — 25000003 PHARM REV CODE 250: Performed by: ANESTHESIOLOGY

## 2019-12-19 PROCEDURE — 45378 DIAGNOSTIC COLONOSCOPY: CPT | Mod: 53,,, | Performed by: INTERNAL MEDICINE

## 2019-12-19 PROCEDURE — 37000009 HC ANESTHESIA EA ADD 15 MINS: Performed by: INTERNAL MEDICINE

## 2019-12-19 PROCEDURE — 63600175 PHARM REV CODE 636 W HCPCS: Performed by: ANESTHESIOLOGY

## 2019-12-19 PROCEDURE — 45378 PR COLONOSCOPY,DIAGNOSTIC: ICD-10-PCS | Mod: 53,,, | Performed by: INTERNAL MEDICINE

## 2019-12-19 PROCEDURE — E9220 PRA ENDO ANESTHESIA: ICD-10-PCS | Mod: ,,, | Performed by: NURSE ANESTHETIST, CERTIFIED REGISTERED

## 2019-12-19 PROCEDURE — 37000008 HC ANESTHESIA 1ST 15 MINUTES: Performed by: INTERNAL MEDICINE

## 2019-12-19 RX ORDER — PROPOFOL 10 MG/ML
VIAL (ML) INTRAVENOUS CONTINUOUS PRN
Status: DISCONTINUED | OUTPATIENT
Start: 2019-12-19 | End: 2019-12-19

## 2019-12-19 RX ORDER — PROPOFOL 10 MG/ML
VIAL (ML) INTRAVENOUS
Status: DISCONTINUED | OUTPATIENT
Start: 2019-12-19 | End: 2019-12-19

## 2019-12-19 RX ORDER — SODIUM CHLORIDE 0.9 % (FLUSH) 0.9 %
3 SYRINGE (ML) INJECTION
Status: DISCONTINUED | OUTPATIENT
Start: 2019-12-19 | End: 2019-12-19 | Stop reason: HOSPADM

## 2019-12-19 RX ORDER — OXYCODONE AND ACETAMINOPHEN 5; 325 MG/1; MG/1
1 TABLET ORAL ONCE
Status: COMPLETED | OUTPATIENT
Start: 2019-12-19 | End: 2019-12-19

## 2019-12-19 RX ORDER — FENTANYL CITRATE 50 UG/ML
25 INJECTION, SOLUTION INTRAMUSCULAR; INTRAVENOUS EVERY 5 MIN PRN
Status: DISCONTINUED | OUTPATIENT
Start: 2019-12-19 | End: 2019-12-19 | Stop reason: HOSPADM

## 2019-12-19 RX ORDER — LIDOCAINE HCL/PF 100 MG/5ML
SYRINGE (ML) INTRAVENOUS
Status: DISCONTINUED | OUTPATIENT
Start: 2019-12-19 | End: 2019-12-19

## 2019-12-19 RX ORDER — GLYCOPYRROLATE 0.2 MG/ML
INJECTION INTRAMUSCULAR; INTRAVENOUS
Status: DISCONTINUED | OUTPATIENT
Start: 2019-12-19 | End: 2019-12-19

## 2019-12-19 RX ORDER — AMLODIPINE BESYLATE 2.5 MG/1
2.5 TABLET ORAL DAILY
COMMUNITY
End: 2022-02-15

## 2019-12-19 RX ORDER — SODIUM CHLORIDE 9 MG/ML
INJECTION, SOLUTION INTRAVENOUS CONTINUOUS
Status: DISCONTINUED | OUTPATIENT
Start: 2019-12-19 | End: 2019-12-19 | Stop reason: HOSPADM

## 2019-12-19 RX ORDER — FLUCONAZOLE 200 MG/1
200 TABLET ORAL DAILY
Qty: 21 TABLET | Refills: 0 | Status: SHIPPED | OUTPATIENT
Start: 2019-12-19 | End: 2020-01-09

## 2019-12-19 RX ORDER — PHENYLEPHRINE HYDROCHLORIDE 10 MG/ML
INJECTION INTRAVENOUS
Status: DISCONTINUED | OUTPATIENT
Start: 2019-12-19 | End: 2019-12-19

## 2019-12-19 RX ADMIN — LIDOCAINE HYDROCHLORIDE 100 MG: 20 INJECTION, SOLUTION INTRAVENOUS at 02:12

## 2019-12-19 RX ADMIN — PROPOFOL 175 MCG/KG/MIN: 10 INJECTION, EMULSION INTRAVENOUS at 02:12

## 2019-12-19 RX ADMIN — GLYCOPYRROLATE 0.2 MG: 0.2 INJECTION, SOLUTION INTRAMUSCULAR; INTRAVENOUS at 02:12

## 2019-12-19 RX ADMIN — PROPOFOL 70 MG: 10 INJECTION, EMULSION INTRAVENOUS at 02:12

## 2019-12-19 RX ADMIN — FENTANYL CITRATE 25 MCG: 50 INJECTION INTRAMUSCULAR; INTRAVENOUS at 04:12

## 2019-12-19 RX ADMIN — PHENYLEPHRINE HYDROCHLORIDE 100 MCG: 10 INJECTION INTRAVENOUS at 03:12

## 2019-12-19 RX ADMIN — OXYCODONE HYDROCHLORIDE AND ACETAMINOPHEN 1 TABLET: 5; 325 TABLET ORAL at 04:12

## 2019-12-19 RX ADMIN — SODIUM CHLORIDE: 0.9 INJECTION, SOLUTION INTRAVENOUS at 02:12

## 2019-12-19 NOTE — [employer] PATIENT INSTRUCTIONS
Discharge Summary/Instructions after an Endoscopic Procedure  Patient Name: Aline Fuller  Patient MRN: [id number]  Patient YOB: 1960 Thursday, December 19, 2019  Diamond Barbour MD  RESTRICTIONS:  During your procedure today, you received medications for sedation.  These   medications may affect your judgment, balance and coordination.  Therefore,   for 24 hours, you have the following restrictions:   - DO NOT drive a car, operate machinery, make legal/financial decisions,   sign important papers or drink alcohol.    ACTIVITY:  Today: no heavy lifting, straining or running due to procedural   sedation/anesthesia.  The following day: return to full activity including work.  DIET:  Eat and drink normally unless instructed otherwise.     TREATMENT FOR COMMON SIDE EFFECTS:  - Mild abdominal pain, nausea, belching, bloating or excessive gas:  rest,   eat lightly and use a heating pad.  - Sore Throat: treat with throat lozenges and/or gargle with warm salt   water.  - Because air was used during the procedure, expelling large amounts of air   from your rectum or belching is normal.  - If a bowel prep was taken, you may not have a bowel movement for 1-3 days.    This is normal.  SYMPTOMS TO WATCH FOR AND REPORT TO YOUR PHYSICIAN:  1. Abdominal pain or bloating, other than gas cramps.  2. Chest pain.  3. Back pain.  4. Signs of infection such as: chills or fever occurring within 24 hours   after the procedure.  5. Rectal bleeding, which would show as bright red, maroon, or black stools.   (A tablespoon of blood from the rectum is not serious, especially if   hemorrhoids are present.)  6. Vomiting.  7. Weakness or dizziness.  GO DIRECTLY TO THE NEAREST EMERGENCY ROOM IF YOU HAVE ANY OF THE FOLLOWING:      Difficulty breathing              Chills and/or fever over 101 F   Persistent vomiting and/or vomiting blood   Severe abdominal pain   Severe chest pain   Black, tarry stools   Bleeding- more than one  tablespoon   Any other symptom or condition that you feel may need urgent attention  Your doctor recommends these additional instructions:  If any biopsies were taken, your doctors clinic will contact you in 1 to 2   weeks with any results.  - Patient has a contact number available for emergencies.  The signs and   symptoms of potential delayed complications were discussed with the   patient.  Return to normal activities tomorrow.  Written discharge   instructions were provided to the patient.   - Resume previous diet.   - Continue present medications.   - Diflucan (fluconazole) 200 mg PO daily for 21 days. Prescription has been   sent to your pharmacy- [facility].   - Discharge patient to home.   - Resume anticoagulant at prior dose.  For questions, problems or results please call your physician - Diamond Barbour MD at Work:  [phone].  [facility], EMERGENCY ROOM PHONE NUMBER: [phone]  IF A COMPLICATION OR EMERGENCY SITUATION ARISES AND YOU ARE UNABLE TO REACH   YOUR PHYSICIAN - GO DIRECTLY TO THE EMERGENCY ROOM.  Diamond Barbour MD  12/19/2019 4:43:07 PM  This report has been verified and signed electronically.  [employer]

## 2019-12-19 NOTE — ANESTHESIA POSTPROCEDURE EVALUATION
Anesthesia Post Evaluation    Patient: Aline Fuller    Procedure(s) Performed: Procedure(s) (LRB):  EGD (ESOPHAGOGASTRODUODENOSCOPY) (N/A)  COLONOSCOPY (N/A)    Final Anesthesia Type: general    Patient location during evaluation: GI PACU  Patient participation: Yes- Able to Participate  Level of consciousness: awake and alert  Post-procedure vital signs: reviewed and stable  Pain management: adequate  Airway patency: patent    PONV status at discharge: No PONV  Anesthetic complications: no      Cardiovascular status: blood pressure returned to baseline  Respiratory status: unassisted  Hydration status: euvolemic  Follow-up not needed.          Vitals Value Taken Time   /82 12/19/2019  4:44 PM   Temp 36.5 °C (97.7 °F) 12/19/2019  3:54 PM   Pulse 68 12/19/2019  4:44 PM   Resp 17 12/19/2019  4:44 PM   SpO2 98 % 12/19/2019  4:44 PM         No case tracking events are documented in the log.      Pain/Jil Score: Pain Rating Prior to Med Admin: 0 (12/19/2019  4:51 PM)  Jil Score: 9 (12/19/2019  4:38 PM)

## 2019-12-19 NOTE — H&P
Short Stay Endoscopy History and Physical    PCP - Jamie Polo MD  Referring Physician - Jamie Polo MD  [address] [place] [address]    Procedure - EGD and Colonoscopy  ASA - per anesthesia  Mallampati - per anesthesia  History of Anesthesia problems - no  Family history Anesthesia problems -  no   Plan of anesthesia - General    HPI  59 y.o. 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    Surgical History:  has a past surgical history that includes Colonoscopy; Esophagogastroduodenoscopy; colon resections; Appendectomy; Knee surgery (left); Breast lumpectomy (Right); fistula repairs; Colonoscopy (N/A, 10/24/2016); Upper gastrointestinal endoscopy; Cholecystectomy; Small intestine surgery; Colon surgery (1972 or 1973, 1979); Colonoscopy (N/A, 8/29/2017); Esophagogastroduodenoscopy (N/A, 10/10/2018); Colonoscopy (N/A, 10/10/2018); Examination under anesthesia (Left, 4/10/2019); and Biopsy of anus (4/10/2019).    Family History: family history includes Anesthesia problems in her sister; Breast cancer in her sister; Colon cancer (age of onset: 60) in her mother; Heart attack in her father; Heart disease in her brother and paternal uncle; Irritable bowel syndrome in her sister; Ovarian cancer in her maternal aunt; Pancreatic cancer in her maternal grandmother; Stomach cancer (age of onset: 68) in her maternal uncle., see HPI    Social History:  reports that she has never smoked. She has never used smokeless tobacco. She reports that she does not drink alcohol or use  drugs.    Review of patient's allergies indicates:   Allergen Reactions    Sulfa (sulfonamide antibiotics)      Other reaction(s): Rash       Medications:   Medications Prior to Admission   Medication Sig Dispense Refill Last Dose    amLODIPine (NORVASC) 2.5 MG tablet Take 2.5 mg by mouth once daily.   12/18/2019 at Unknown time    bisacodyl (DULCOLAX) 5 mg EC tablet Take 5 mg by mouth daily as needed for Constipation.   12/18/2019 at Unknown time    cholecalciferol, vitamin D3, 5,000 unit capsule Take 5,000 Units by mouth 2 (two) times daily. 1 Capsule Oral Every day   Past Week at Unknown time    cyanocobalamin 1,000 mcg/mL injection 1,000 mcg every 30 days.   1 Past Month at Unknown time    tamoxifen (NOLVADEX) 20 MG Tab Take 20 mg by mouth every evening .   12/18/2019 at Unknown time    triamcinolone acetonide 0.1% (KENALOG) 0.1 % cream    Past Week at Unknown time    fluticasone (FLONASE) 50 mcg/actuation nasal spray 1 spray by Each Nare route daily as needed.   0 More than a month at Unknown time    INFLIXIMAB (REMICADE IV) Inject 10 mg/kg into the vein every 6 weeks.    More than a month at Unknown time    inFLIXimab (REMICADE) 100 mg injection Inject into the vein.   More than a month at Unknown time    mupirocin (BACTROBAN) 2 % ointment    Taking    predniSONE (DELTASONE) 10 MG tablet    More than a month       Physical Exam:    Vital Signs:   Vitals:    12/19/19 1404   BP: (!) 146/65   Pulse: 95   Resp: 17   Temp: 98.1 °F (36.7 °C)       General Appearance: Well appearing in no acute distress  Abdomen: Soft, non tender, non distended with normal bowel sounds, no masses    Labs:  Lab Results   Component Value Date    WBC 7.28 06/15/2017    HGB 10.6 (L) 06/15/2017    HCT 34.6 (L) 06/15/2017     06/15/2017    ALT 21 06/15/2017    AST 20 06/15/2017     04/09/2019    K 3.8 04/09/2019     04/09/2019    CREATININE 1.1 04/09/2019    BUN 12 04/09/2019    CO2 27 04/09/2019       I have  explained the risks and benefits of this endoscopic procedure to the patient including but not limited to bleeding, inflammation, infection, perforation, and death.      Diamond Barbour MD

## 2019-12-19 NOTE — ANESTHESIA PREPROCEDURE EVALUATION
12/19/2019  Aline Fuller is a 59 y.o., female.    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     Breast cancer dxed 4/2014 4/2014 diagnosed, Chemo/XRT completed Nov/Dec 2014, on tamoxifen    Colon polyp     Crohn's disease     Dysphagia, pharyngoesophageal phase     Ocular hypertension     Personal history of colonic polyps     Vitamin B deficiency      Patient Active Problem List   Diagnosis    Breast cancer    Crohn's disease of both small and large intestine with intestinal obstruction    Vitamin D deficiency    High risk medication use    Crohn's disease of small intestine    Anal stricture    Dysphagia    Crohn's disease of small and large intestines with complication     Past Surgical History:   Procedure Laterality Date    APPENDECTOMY      BIOPSY OF ANUS  4/10/2019    Procedure: BIOPSY, ANUS;  Surgeon: Rhett Aparicio MD;  Location: [facility] OR [address] [place] [address];  Service: Colon and Rectal;;    BREAST LUMPECTOMY Right     CHOLECYSTECTOMY      colon resections      COLON SURGERY  1972 or 1973, 1979    COLONOSCOPY      COLONOSCOPY N/A 10/24/2016    Procedure: COLONOSCOPY;  Surgeon: Jamie Polo MD;  Location: [facility] ([address] [place] [address]);  Service: Endoscopy;  Laterality: N/A;    COLONOSCOPY N/A 8/29/2017    Procedure: COLONOSCOPY;  Surgeon: Diamond Barbour MD;  Location: [facility] ([address] [place] [address]);  Service: Endoscopy;  Laterality: N/A;  schedule as 45 minute case    COLONOSCOPY N/A 10/10/2018    Procedure: COLONOSCOPY;  Surgeon: Diamond Barbour MD;  Location: [facility] (4TH FLR);  Service: Endoscopy;  Laterality: N/A;    ESOPHAGOGASTRODUODENOSCOPY      ESOPHAGOGASTRODUODENOSCOPY N/A 10/10/2018    Procedure: EGD (ESOPHAGOGASTRODUODENOSCOPY);  Surgeon: Diamond Barbour MD;  Location: [address]);  Service: Endoscopy;   Laterality: N/A;    EXAMINATION UNDER ANESTHESIA Left 4/10/2019    Procedure: Exam under anesthesia botox;  Surgeon: Rhett Aparicio MD;  Location: [facility] OR [address] [place] [address];  Service: Colon and Rectal;  Laterality: Left;  S/P- Medi port placed to upper left chest    fistula repairs      KNEE SURGERY  left    SMALL INTESTINE SURGERY      1997    UPPER GASTROINTESTINAL ENDOSCOPY           Anesthesia Evaluation    I have reviewed the Patient Summary Reports.     I have reviewed the Medications.     Review of Systems  Anesthesia Hx:  Denies Family Hx of Anesthesia complications.   Denies Personal Hx of Anesthesia complications.   Hematology/Oncology:  Hematology Normal   Oncology Normal     EENT/Dental:EENT/Dental Normal   Cardiovascular:  Cardiovascular Normal     Pulmonary:  Pulmonary Normal    Renal/:  Renal/ Normal     Hepatic/GI:  Hepatic/GI Normal    Musculoskeletal:  Musculoskeletal Normal    Neurological:  Neurology Normal    Endocrine:  Endocrine Normal    Dermatological:  Skin Normal    Psych:  Psychiatric Normal           Physical Exam  General:  Well nourished    Airway/Jaw/Neck:  Airway Findings: Mouth Opening: Normal Tongue: Normal  General Airway Assessment: Adult  Mallampati: I  TM Distance: Normal, at least 6 cm  Jaw/Neck Findings:  Neck ROM: Normal ROM  Neck Findings: Normal     Dental:  Dental Findings: In tact   Chest/Lungs:  Chest/Lungs Clear    Heart/Vascular:  Heart Findings: Normal    Abdomen:  Abdomen Findings: Normal      Mental Status:  Mental Status Findings: Normal        Anesthesia Plan  Type of Anesthesia, risks & benefits discussed:  Anesthesia Type:  general  Patient's Preference:   Intra-op Monitoring Plan: standard ASA monitors  Intra-op Monitoring Plan Comments:   Post Op Pain Control Plan:   Post Op Pain Control Plan Comments:   Induction:   IV  Beta Blocker:  Patient is not currently on a Beta-Blocker (No further documentation required).       Informed Consent: Patient understands  risks and agrees with Anesthesia plan.  Questions answered. Anesthesia consent signed with patient.  ASA Score: 2     Day of Surgery Review of History & Physical:    H&P update referred to the provider.         Ready For Surgery From Anesthesia Perspective.

## 2019-12-19 NOTE — [employer] PATIENT INSTRUCTIONS
Discharge Summary/Instructions after an Endoscopic Procedure  Patient Name: Aline Fuller  Patient MRN: [id number]  Patient YOB: 1960 Thursday, December 19, 2019  Diamond Barbour MD  RESTRICTIONS:  During your procedure today, you received medications for sedation.  These   medications may affect your judgment, balance and coordination.  Therefore,   for 24 hours, you have the following restrictions:   - DO NOT drive a car, operate machinery, make legal/financial decisions,   sign important papers or drink alcohol.    ACTIVITY:  Today: no heavy lifting, straining or running due to procedural   sedation/anesthesia.  The following day: return to full activity including work.  DIET:  Eat and drink normally unless instructed otherwise.     TREATMENT FOR COMMON SIDE EFFECTS:  - Mild abdominal pain, nausea, belching, bloating or excessive gas:  rest,   eat lightly and use a heating pad.  - Sore Throat: treat with throat lozenges and/or gargle with warm salt   water.  - Because air was used during the procedure, expelling large amounts of air   from your rectum or belching is normal.  - If a bowel prep was taken, you may not have a bowel movement for 1-3 days.    This is normal.  SYMPTOMS TO WATCH FOR AND REPORT TO YOUR PHYSICIAN:  1. Abdominal pain or bloating, other than gas cramps.  2. Chest pain.  3. Back pain.  4. Signs of infection such as: chills or fever occurring within 24 hours   after the procedure.  5. Rectal bleeding, which would show as bright red, maroon, or black stools.   (A tablespoon of blood from the rectum is not serious, especially if   hemorrhoids are present.)  6. Vomiting.  7. Weakness or dizziness.  GO DIRECTLY TO THE NEAREST EMERGENCY ROOM IF YOU HAVE ANY OF THE FOLLOWING:      Difficulty breathing              Chills and/or fever over 101 F   Persistent vomiting and/or vomiting blood   Severe abdominal pain   Severe chest pain   Black, tarry stools   Bleeding- more than one  tablespoon   Any other symptom or condition that you feel may need urgent attention  Your doctor recommends these additional instructions:  If any biopsies were taken, your doctors clinic will contact you in 1 to 2   weeks with any results.  - Discharge patient to home.   - Patient has a contact number available for emergencies.  The signs and   symptoms of potential delayed complications were discussed with the   patient.  Return to normal activities tomorrow.  Written discharge   instructions were provided to the patient.   - Resume previous diet.   - Continue present medications.   - Await pathology results.   - Return to GI clinic.   - Repeat colonoscopy (date not yet determined).  For questions, problems or results please call your physician - Diamond Barbour MD at Work:  [phone].  [facility], EMERGENCY ROOM PHONE NUMBER: [phone]  IF A COMPLICATION OR EMERGENCY SITUATION ARISES AND YOU ARE UNABLE TO REACH   YOUR PHYSICIAN - GO DIRECTLY TO THE EMERGENCY ROOM.  Diamond Barbour MD  12/19/2019 4:08:04 PM  This report has been verified and signed electronically.  [employer]

## 2019-12-20 ENCOUNTER — TELEPHONE (OUTPATIENT)
Dept: ENDOSCOPY | Facility: HOSPITAL | Age: 59
End: 2019-12-20

## 2019-12-20 ENCOUNTER — TELEPHONE (OUTPATIENT)
Dept: GASTROENTEROLOGY | Facility: CLINIC | Age: 59
End: 2019-12-20

## 2019-12-20 DIAGNOSIS — B37.81 ESOPHAGEAL CANDIDIASIS: Primary | ICD-10-CM

## 2019-12-20 DIAGNOSIS — K22.2 ESOPHAGEAL STRICTURE: ICD-10-CM

## 2019-12-20 NOTE — TELEPHONE ENCOUNTER
Called and spoke to patient she was in the middle of getting remicade infusion. She stated she will call endoscopy scheduling staff on Monday to schedule EGD. Once she schedule appointments she would call me back to schedule follow up with Dr Barbour.     Reminder set to follow up with patient on Tuesday to make sure appointments are schedule.

## 2019-12-20 NOTE — TELEPHONE ENCOUNTER
----- Message from Diamond Barbour MD sent at 12/20/2019  8:14 AM CST -----  IBD team- Please coordinate EGD in the afternoon with appt with Dr Lloyd in the morning on the same day in 1 month. 1 month time frame is because she is getting 21 days of diflucan for esophageal candidiasis.     She needs f/u with me about 3-4 weeks after seeing them.      Ernestine- this is the patient I spoke to you about that needs EUA that had seen Rhett in the past    Jamie- pt has esophageal candidiasis and I gave 21 days of diflucan and I was not aggressive about stricture dilation due to inflammation so hoping you can help with her EGD- know pt to you.  She had incomplete colonoscopy due to significant resistance with the anal stricture which was basically completely close. I am still always worried we are missing anal cancer in these anal stricture patients so am referring to Ernestine for EUA and biopsies. Pt knows game plan. The colon that I evaluated was normal but again incomplete.

## 2019-12-26 ENCOUNTER — TELEPHONE (OUTPATIENT)
Dept: ENDOSCOPY | Facility: HOSPITAL | Age: 59
End: 2019-12-26

## 2019-12-26 NOTE — TELEPHONE ENCOUNTER
Patient upset and does not recall or understand plan of care outline after her scopes with her and her sister.     I called patient and discussed that I feel that with the tight anal stricture she needs surgery with colostomy. She does not wish to make appt with [facility] to schedule this at this time. She understands risk of severe anal stricture including risk of cancer with this and risk of obstruction. EGD showed esophageal candidiasis and she is taking diflucan for 3 weeks. Plans for repeat EGD with Dr. Polo.  Pt inadvertently scheduled with me but I prefer since esophageal stricture and primary GI is Dr. Polo that this remains with Dr. Polo. I have recommended she f/u with Dr. Polo and discuss risks/benefits of colostomy, make sure labs are done regularly and will need repeat colonoscopy- given last one was incomplete due to anal stricture. This can also be with Dr. Polo. She needs anal stricture dilation every 3-6 mos.     Plan  - Kacey Yan (endo ) to cancel procedure with me and schedule with Dr. Polo  - pt needs f/u with Dr Polo  - pt does not want to see CRS again at this time

## 2019-12-26 NOTE — TELEPHONE ENCOUNTER
Called patient regarding scheduling EGD procedure ordered. No answer. Left message with my direct contact number for patient to return my phone call to schedule procedure.

## 2019-12-27 ENCOUNTER — TELEPHONE (OUTPATIENT)
Dept: ENDOSCOPY | Facility: HOSPITAL | Age: 59
End: 2019-12-27

## 2019-12-27 NOTE — TELEPHONE ENCOUNTER
Dr. Barbour,    I spoke with patient today and scheduled her for EGD procedure with Dr. Polo on 1/29/20 at 2:00 pm.    Thanks,  Ashlie

## 2020-01-03 ENCOUNTER — TELEPHONE (OUTPATIENT)
Dept: GASTROENTEROLOGY | Facility: CLINIC | Age: 60
End: 2020-01-03

## 2020-01-03 NOTE — TELEPHONE ENCOUNTER
----- Message from Jamie Polo MD sent at 1/3/2020  8:04 AM CST -----  Please schedule a follow up with me in February.

## 2020-01-03 NOTE — TELEPHONE ENCOUNTER
Samantha,   Please schedule pt on 2/13/2020 with Dr. Polo at 1pm  Pt notified and  confirm  Thanks

## 2020-01-18 NOTE — TRANSFER OF CARE
"Anesthesia Transfer of Care Note    Patient: Aline Fuller    Procedure(s) Performed: Procedure(s) (LRB):  EGD (ESOPHAGOGASTRODUODENOSCOPY) (N/A)  COLONOSCOPY (N/A)    Patient location: PACU    Anesthesia Type: general    Transport from OR: Transported from OR on room air with adequate spontaneous ventilation    Post pain: adequate analgesia    Post assessment: no apparent anesthetic complications    Post vital signs: stable    Level of consciousness: sedated    Nausea/Vomiting: no nausea/vomiting    Complications: none    Transfer of care protocol was followed      Last vitals:   Visit Vitals  BP (!) 146/65   Pulse 95   Temp 36.7 °C (98.1 °F)   Resp 17   Ht 5' 6" (1.676 m)   Wt 75.8 kg (167 lb)   SpO2 95%   Breastfeeding? No   BMI 26.95 kg/m²     " Madeleine Colon   [phone]

## 2020-01-29 ENCOUNTER — TELEPHONE (OUTPATIENT)
Dept: SURGERY | Facility: CLINIC | Age: 60
End: 2020-01-29

## 2020-01-29 ENCOUNTER — TELEPHONE (OUTPATIENT)
Dept: GASTROENTEROLOGY | Facility: CLINIC | Age: 60
End: 2020-01-29

## 2020-01-29 ENCOUNTER — HOSPITAL ENCOUNTER (OUTPATIENT)
Facility: HOSPITAL | Age: 60
Discharge: HOME | End: 2020-01-29
Attending: INTERNAL MEDICINE | Admitting: INTERNAL MEDICINE
Payer: COMMERCIAL

## 2020-01-29 ENCOUNTER — ANESTHESIA (OUTPATIENT)
Dept: ENDOSCOPY | Facility: HOSPITAL | Age: 60
End: 2020-01-29
Payer: COMMERCIAL

## 2020-01-29 ENCOUNTER — ANESTHESIA EVENT (OUTPATIENT)
Dept: ENDOSCOPY | Facility: HOSPITAL | Age: 60
End: 2020-01-29
Payer: COMMERCIAL

## 2020-01-29 VITALS
TEMPERATURE: 98 F | SYSTOLIC BLOOD PRESSURE: 145 MMHG | BODY MASS INDEX: 27.99 KG/M2 | OXYGEN SATURATION: 99 % | HEIGHT: 65 IN | DIASTOLIC BLOOD PRESSURE: 73 MMHG | RESPIRATION RATE: 20 BRPM | HEART RATE: 88 BPM | WEIGHT: 168 LBS

## 2020-01-29 DIAGNOSIS — R13.19 ESOPHAGEAL DYSPHAGIA: Primary | ICD-10-CM

## 2020-01-29 DIAGNOSIS — K22.2 ESOPHAGEAL STRICTURE: ICD-10-CM

## 2020-01-29 PROCEDURE — 43249 PR EGD, FLEX, W/BALL DILATION, < 30MM: ICD-10-PCS | Mod: ,,, | Performed by: INTERNAL MEDICINE

## 2020-01-29 PROCEDURE — E9220 PRA ENDO ANESTHESIA: HCPCS | Mod: ,,, | Performed by: NURSE ANESTHETIST, CERTIFIED REGISTERED

## 2020-01-29 PROCEDURE — 63600175 PHARM REV CODE 636 W HCPCS: Performed by: NURSE ANESTHETIST, CERTIFIED REGISTERED

## 2020-01-29 PROCEDURE — 37000009 HC ANESTHESIA EA ADD 15 MINS: Performed by: INTERNAL MEDICINE

## 2020-01-29 PROCEDURE — C1726 CATH, BAL DIL, NON-VASCULAR: HCPCS | Performed by: INTERNAL MEDICINE

## 2020-01-29 PROCEDURE — E9220 PRA ENDO ANESTHESIA: ICD-10-PCS | Mod: ,,, | Performed by: NURSE ANESTHETIST, CERTIFIED REGISTERED

## 2020-01-29 PROCEDURE — 43249 ESOPH EGD DILATION <30 MM: CPT | Performed by: INTERNAL MEDICINE

## 2020-01-29 PROCEDURE — 37000008 HC ANESTHESIA 1ST 15 MINUTES: Performed by: INTERNAL MEDICINE

## 2020-01-29 PROCEDURE — 43249 ESOPH EGD DILATION <30 MM: CPT | Mod: ,,, | Performed by: INTERNAL MEDICINE

## 2020-01-29 RX ORDER — SODIUM CHLORIDE 9 MG/ML
INJECTION, SOLUTION INTRAVENOUS CONTINUOUS
Status: DISCONTINUED | OUTPATIENT
Start: 2020-01-29 | End: 2020-01-29 | Stop reason: HOSPADM

## 2020-01-29 RX ORDER — PROPOFOL 10 MG/ML
VIAL (ML) INTRAVENOUS CONTINUOUS PRN
Status: DISCONTINUED | OUTPATIENT
Start: 2020-01-29 | End: 2020-01-29

## 2020-01-29 RX ORDER — PROPOFOL 10 MG/ML
VIAL (ML) INTRAVENOUS
Status: DISCONTINUED | OUTPATIENT
Start: 2020-01-29 | End: 2020-01-29

## 2020-01-29 RX ORDER — LIDOCAINE HCL/PF 100 MG/5ML
SYRINGE (ML) INTRAVENOUS
Status: DISCONTINUED | OUTPATIENT
Start: 2020-01-29 | End: 2020-01-29

## 2020-01-29 RX ADMIN — PROPOFOL 100 MCG/KG/MIN: 10 INJECTION, EMULSION INTRAVENOUS at 01:01

## 2020-01-29 RX ADMIN — PROPOFOL 90 MG: 10 INJECTION, EMULSION INTRAVENOUS at 01:01

## 2020-01-29 RX ADMIN — Medication 60 MG: at 01:01

## 2020-01-29 NOTE — H&P
Short Stay Endoscopy History and Physical    PCP - Jamie Polo MD    Procedure - EGD  Sedation: GA  ASA - per anesthesia  Mallampati - per anesthesia  History of Anesthesia problems - no  Family history Anesthesia problems -  no     HPI:  This is a 59 y.o. female here for evaluation of : Esophageal stricture    Reflux - no  Dysphagia - yes  Abdominal pain - no  Diarrhea - no    ROS:  Constitutional: No fevers, chills, No weight loss  ENT: No allergies  CV: No chest pain  Pulm: No cough, No shortness of breath  Ophtho: No vision changes  GI: see HPI  Medical History:  has a past medical history of Allergic rhinitis, Anemia, Arthritis in Crohn's disease, Breast cancer (dxed 4/2014), Colon polyp, Crohn's disease, Dysphagia, pharyngoesophageal phase, Ocular hypertension, Personal history of colonic polyps, and Vitamin B deficiency.    Surgical History:  has a past surgical history that includes Colonoscopy; Esophagogastroduodenoscopy; colon resections; Appendectomy; Knee surgery (left); Breast lumpectomy (Right); fistula repairs; Colonoscopy (N/A, 10/24/2016); Upper gastrointestinal endoscopy; Cholecystectomy; Small intestine surgery; Colon surgery (1972 or 1973, 1979); Colonoscopy (N/A, 8/29/2017); Esophagogastroduodenoscopy (N/A, 10/10/2018); Colonoscopy (N/A, 10/10/2018); Examination under anesthesia (Left, 4/10/2019); Biopsy of anus (4/10/2019); Esophagogastroduodenoscopy (N/A, 12/19/2019); and Colonoscopy (N/A, 12/19/2019).    Family History: family history includes Anesthesia problems in her sister; Breast cancer in her sister; Colon cancer (age of onset: 60) in her mother; Heart attack in her father; Heart disease in her brother and paternal uncle; Irritable bowel syndrome in her sister; Ovarian cancer in her maternal aunt; Pancreatic cancer in her maternal grandmother; Stomach cancer (age of onset: 68) in her maternal uncle.. Otherwise no colon cancer, inflammatory bowel disease, or GI  malignancies.    Social History:  reports that she has never smoked. She has never used smokeless tobacco. She reports that she does not drink alcohol or use drugs.    Review of patient's allergies indicates:   Allergen Reactions    Sulfa (sulfonamide antibiotics)      Other reaction(s): Rash       Medications:   Medications Prior to Admission   Medication Sig Dispense Refill Last Dose    amLODIPine (NORVASC) 2.5 MG tablet Take 2.5 mg by mouth once daily.   1/28/2020 at Unknown time    bisacodyl (DULCOLAX) 5 mg EC tablet Take 5 mg by mouth daily as needed for Constipation.   Past Month at Unknown time    cholecalciferol, vitamin D3, 5,000 unit capsule Take 5,000 Units by mouth 2 (two) times daily. 1 Capsule Oral Every day   Past Week at Unknown time    cyanocobalamin 1,000 mcg/mL injection 1,000 mcg every 30 days.   1 Past Month at Unknown time    tamoxifen (NOLVADEX) 20 MG Tab Take 20 mg by mouth every evening .   1/28/2020 at Unknown time    triamcinolone acetonide 0.1% (KENALOG) 0.1 % cream    Past Week at Unknown time    fluticasone (FLONASE) 50 mcg/actuation nasal spray 1 spray by Each Nare route daily as needed.   0 More than a month at Unknown time    INFLIXIMAB (REMICADE IV) Inject 10 mg/kg into the vein every 6 weeks.    More than a month at Unknown time    inFLIXimab (REMICADE) 100 mg injection Inject into the vein.   Taking    mupirocin (BACTROBAN) 2 % ointment    Taking    predniSONE (DELTASONE) 10 MG tablet    More than a month at Unknown time       Objective Findings:    Vital Signs: Per nursing notes.    Physical Exam:  General Appearance: Well appearing in no acute distress  Head:   Normocephalic, without obvious abnormality  Eyes:    No scleral icterus  Airway: Open  Neck: No restriction in mobility  Lungs: CTA bilaterally in anterior and posterior fields, no wheezes, no crackles.  Heart:  Regular rate and rhythm, S1, S2 normal, no murmurs heard  Abdomen: Soft, non tender, non  distended      Labs:  Lab Results   Component Value Date    WBC 7.28 06/15/2017    HGB 10.6 (L) 06/15/2017    HCT 34.6 (L) 06/15/2017     06/15/2017    ALT 21 06/15/2017    AST 20 06/15/2017     04/09/2019    K 3.8 04/09/2019     04/09/2019    CREATININE 1.1 04/09/2019    BUN 12 04/09/2019    CO2 27 04/09/2019         I have explained the risks and benefits of endoscopy procedures to the patient including but not limited to bleeding, perforation, infection, and death.    Thank you so much for allowing me to participate in the care of Aline Polo MD

## 2020-01-29 NOTE — TELEPHONE ENCOUNTER
----- Message from Jamie Polo MD sent at 1/29/2020  2:37 PM CST -----  Please schedule a 4 month follow up with me and with  in 3 months (EUA with anal stricture dilation and biopsies).

## 2020-01-29 NOTE — [employer] PATIENT INSTRUCTIONS
Discharge Summary/Instructions after an Endoscopic Procedure  Patient Name: Aline Fuller  Patient MRN: [id number]  Patient YOB: 1960 Wednesday, January 29, 2020  Jamie Polo MD  RESTRICTIONS:  During your procedure today, you received medications for sedation.  These   medications may affect your judgment, balance and coordination.  Therefore,   for 24 hours, you have the following restrictions:   - DO NOT drive a car, operate machinery, make legal/financial decisions,   sign important papers or drink alcohol.    ACTIVITY:  Today: no heavy lifting, straining or running due to procedural   sedation/anesthesia.  The following day: return to full activity including work.  DIET:  Eat and drink normally unless instructed otherwise.     TREATMENT FOR COMMON SIDE EFFECTS:  - Mild abdominal pain, nausea, belching, bloating or excessive gas:  rest,   eat lightly and use a heating pad.  - Sore Throat: treat with throat lozenges and/or gargle with warm salt   water.  - Because air was used during the procedure, expelling large amounts of air   from your rectum or belching is normal.  - If a bowel prep was taken, you may not have a bowel movement for 1-3 days.    This is normal.  SYMPTOMS TO WATCH FOR AND REPORT TO YOUR PHYSICIAN:  1. Abdominal pain or bloating, other than gas cramps.  2. Chest pain.  3. Back pain.  4. Signs of infection such as: chills or fever occurring within 24 hours   after the procedure.  5. Rectal bleeding, which would show as bright red, maroon, or black stools.   (A tablespoon of blood from the rectum is not serious, especially if   hemorrhoids are present.)  6. Vomiting.  7. Weakness or dizziness.  GO DIRECTLY TO THE NEAREST EMERGENCY ROOM IF YOU HAVE ANY OF THE FOLLOWING:      Difficulty breathing              Chills and/or fever over 101 F   Persistent vomiting and/or vomiting blood   Severe abdominal pain   Severe chest pain   Black, tarry stools   Bleeding- more than one  tablespoon   Any other symptom or condition that you feel may need urgent attention  Your doctor recommends these additional instructions:  If any biopsies were taken, your doctors clinic will contact you in 1 to 2   weeks with any results.  - Patient has a contact number available for emergencies.  The signs and   symptoms of potential delayed complications were discussed with the   patient.  Return to normal activities tomorrow.  Written discharge   instructions were provided to the patient.   - Discharge patient to home.   - Resume previous diet.   - Continue present medications.   - Repeat upper endoscopy in 3 months for retreatment.   For questions, problems or results please call your physician - Jamie Polo MD at Work:  [phone].  [facility], EMERGENCY ROOM PHONE NUMBER: [phone]  IF A COMPLICATION OR EMERGENCY SITUATION ARISES AND YOU ARE UNABLE TO REACH   YOUR PHYSICIAN - GO DIRECTLY TO THE EMERGENCY ROOM.  Jamie Polo MD  1/29/2020 1:47:32 PM  This report has been verified and signed electronically.  [employer]

## 2020-01-29 NOTE — TELEPHONE ENCOUNTER
This patient needs an appointment with  in 3 months (EUA with anal stricture dilation and biopsies).   Per Dr. Polo   Please contact patient and schedule appointment   Thanks

## 2020-01-29 NOTE — ANESTHESIA POSTPROCEDURE EVALUATION
Anesthesia Post Evaluation    Patient: Aline Fuller    Procedure(s) Performed: Procedure(s) (LRB):  ESOPHAGOGASTRODUODENOSCOPY (EGD) (N/A)    Final Anesthesia Type: general    Patient location during evaluation: GI PACU  Patient participation: Yes- Able to Participate  Level of consciousness: awake and alert and oriented  Post-procedure vital signs: reviewed and stable  Pain management: adequate  Airway patency: patent    PONV status at discharge: No PONV  Anesthetic complications: no      Cardiovascular status: hemodynamically stable  Respiratory status: unassisted, spontaneous ventilation and room air  Hydration status: euvolemic  Follow-up not needed.          Vitals Value Taken Time   /73 1/29/2020  2:17 PM   Temp 36.9 °C (98.4 °F) 1/29/2020  1:47 PM   Pulse 88 1/29/2020  2:17 PM   Resp 20 1/29/2020  2:17 PM   SpO2 99 % 1/29/2020  2:17 PM         Event Time     Out of Recovery 14:43:56          Pain/Jil Score: Jil Score: 10 (1/29/2020  2:17 PM)

## 2020-01-29 NOTE — TELEPHONE ENCOUNTER
Appt on 2/13 with Dr. Polo cxl per Dr. Polo request  Pt will return for a 4 month follow up  Recall letter generated

## 2020-01-29 NOTE — ANESTHESIA PREPROCEDURE EVALUATION
01/29/2020  Aline Fuller is a 59 y.o., female.  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     Breast cancer dxed 4/2014 4/2014 diagnosed, Chemo/XRT completed Nov/Dec 2014, on tamoxifen    Colon polyp     Crohn's disease     Dysphagia, pharyngoesophageal phase     Ocular hypertension     Personal history of colonic polyps     Vitamin B deficiency      Past Surgical History:   Procedure Laterality Date    APPENDECTOMY      BIOPSY OF ANUS  4/10/2019    Procedure: BIOPSY, ANUS;  Surgeon: Rhett Aparicio MD;  [facility];  Service: Colon and Rectal;;    BREAST LUMPECTOMY Right     CHOLECYSTECTOMY      colon resections      COLON SURGERY  1972 or 1973, 1979    COLONOSCOPY      COLONOSCOPY N/A 10/24/2016    Procedure: COLONOSCOPY;  Surgeon: Jamie Polo MD;  Location: [facility] (4TH FLR);  Service: Endoscopy;  Laterality: N/A;    COLONOSCOPY N/A 8/29/2017    Procedure: COLONOSCOPY;  Surgeon: Diamond Barbour MD;  Location: [facility] (4TH FLR);  Service: Endoscopy;  Laterality: N/A;  schedule as 45 minute case    COLONOSCOPY N/A 10/10/2018    Procedure: COLONOSCOPY;  Surgeon: Diamond Barbour MD;  Location: [facility] (4TH FLR);  Service: Endoscopy;  Laterality: N/A;    COLONOSCOPY N/A 12/19/2019    Procedure: COLONOSCOPY;  Surgeon: Diamond Barbour MD;  [place]  Service: Endoscopy;  Laterality: N/A;    ESOPHAGOGASTRODUODENOSCOPY      ESOPHAGOGASTRODUODENOSCOPY N/A 10/10/2018    Procedure: EGD (ESOPHAGOGASTRODUODENOSCOPY);  Surgeon: Diamond Barbour MD;  [place]  Service: Endoscopy;  Laterality: N/A;    ESOPHAGOGASTRODUODENOSCOPY N/A 12/19/2019    Procedure: EGD (ESOPHAGOGASTRODUODENOSCOPY);  Surgeon: Diamond Barbour MD;  Location: [facility] ([address] [place] [address]);  Service:  Endoscopy;  Laterality: N/A;    EXAMINATION UNDER ANESTHESIA Left 4/10/2019    Procedure: Exam under anesthesia botox;  Surgeon: Rhett Aparicio MD;  Location: [facility] OR [address] [place] [address];  Service: Colon and Rectal;  Laterality: Left;  S/P- Medi port placed to upper left chest    fistula repairs      KNEE SURGERY  left    SMALL INTESTINE SURGERY      1997    UPPER GASTROINTESTINAL ENDOSCOPY           Anesthesia Evaluation    I have reviewed the Patient Summary Reports.    I have reviewed the Nursing Notes.   I have reviewed the Medications.     Review of Systems  Anesthesia Hx:  No problems with previous Anesthesia    Hematology/Oncology:  Hematology Normal   Oncology Normal     EENT/Dental:EENT/Dental Normal   Cardiovascular:  Cardiovascular Normal     Pulmonary:  Pulmonary Normal    Renal/:  Renal/ Normal     Hepatic/GI:  Hepatic/GI Normal    Musculoskeletal:  Musculoskeletal Normal    Neurological:  Neurology Normal    Endocrine:  Endocrine Normal    Dermatological:  Skin Normal    Psych:  Psychiatric Normal           Physical Exam  General:  Well nourished    Airway/Jaw/Neck:  Airway Findings: Mouth Opening: Normal Tongue: Normal  Mallampati: II  TM Distance: Normal, at least 6 cm        Eyes/Ears/Nose:  EYES/EARS/NOSE FINDINGS: Normal   Dental:  DENTAL FINDINGS: Normal   Chest/Lungs:  Chest/Lungs Clear    Heart/Vascular:  Heart Findings: Normal Heart murmur: negative Vascular Findings: Normal    Abdomen:  Abdomen Findings: Normal    Musculoskeletal:  Musculoskeletal Findings: Normal   Skin:  Skin Findings: Normal    Mental Status:  Mental Status Findings: Normal        Anesthesia Plan  Type of Anesthesia, risks & benefits discussed:  Anesthesia Type:  general  Patient's Preference: general  Intra-op Monitoring Plan: standard ASA monitors  Intra-op Monitoring Plan Comments:   Post Op Pain Control Plan: multimodal analgesia  Post Op Pain Control Plan Comments:   Induction:   IV  Beta Blocker:  Patient is not  currently on a Beta-Blocker (No further documentation required).       Informed Consent: Patient understands risks and agrees with Anesthesia plan.  Questions answered. Anesthesia consent signed with patient.  ASA Score: 2     Day of Surgery Review of History & Physical: I have interviewed and examined the patient. I have reviewed the patient's H&P dated:  There are no significant changes.  H&P update referred to the provider.         Ready For Surgery From Anesthesia Perspective.

## 2020-01-29 NOTE — TRANSFER OF CARE
"Anesthesia Transfer of Care Note    Patient: Aline Fuller    Procedure(s) Performed: Procedure(s) (LRB):  ESOPHAGOGASTRODUODENOSCOPY (EGD) (N/A)    Patient location: GI    Anesthesia Type: general    Transport from OR: Transported from OR on room air with adequate spontaneous ventilation    Post pain: adequate analgesia    Post assessment: no apparent anesthetic complications and tolerated procedure well    Post vital signs: stable    Level of consciousness: awake, alert and oriented    Nausea/Vomiting: no nausea/vomiting    Complications: none    Transfer of care protocol was followed      Last vitals:   Visit Vitals  BP (!) 161/76 (BP Location: Left arm, Patient Position: Lying)   Pulse 98   Temp 36.9 °C (98.4 °F) (Tympanic)   Resp 16   Ht 5' 5" (1.651 m)   Wt 76.2 kg (168 lb)   SpO2 99%   Breastfeeding? No   BMI 27.96 kg/m²     "

## 2020-06-29 ENCOUNTER — TELEPHONE (OUTPATIENT)
Dept: SURGERY | Facility: CLINIC | Age: 60
End: 2020-06-29

## 2020-06-29 NOTE — TELEPHONE ENCOUNTER
Left message to return call regarding appt location change to [facility].

## 2020-07-21 ENCOUNTER — TELEPHONE (OUTPATIENT)
Dept: SURGERY | Facility: CLINIC | Age: 60
End: 2020-07-21

## 2020-07-21 NOTE — TELEPHONE ENCOUNTER
Left message to return call regarding appt. Calling to offer patient sooner appointment than 8/3. Offering 7/27, will message patient through portal as well.

## 2020-07-28 ENCOUNTER — TELEPHONE (OUTPATIENT)
Dept: GASTROENTEROLOGY | Facility: CLINIC | Age: 60
End: 2020-07-28

## 2020-08-03 ENCOUNTER — LAB VISIT (OUTPATIENT)
Dept: LAB | Facility: HOSPITAL | Age: 60
End: 2020-08-03
Attending: INTERNAL MEDICINE
Payer: COMMERCIAL

## 2020-08-03 ENCOUNTER — OFFICE VISIT (OUTPATIENT)
Dept: SURGERY | Facility: CLINIC | Age: 60
End: 2020-08-03
Payer: COMMERCIAL

## 2020-08-03 ENCOUNTER — OFFICE VISIT (OUTPATIENT)
Dept: GASTROENTEROLOGY | Facility: CLINIC | Age: 60
End: 2020-08-03
Payer: COMMERCIAL

## 2020-08-03 VITALS
WEIGHT: 166 LBS | HEIGHT: 65 IN | SYSTOLIC BLOOD PRESSURE: 136 MMHG | BODY MASS INDEX: 27.66 KG/M2 | HEART RATE: 103 BPM | DIASTOLIC BLOOD PRESSURE: 73 MMHG

## 2020-08-03 VITALS
DIASTOLIC BLOOD PRESSURE: 80 MMHG | BODY MASS INDEX: 27.99 KG/M2 | WEIGHT: 168 LBS | HEIGHT: 65 IN | SYSTOLIC BLOOD PRESSURE: 120 MMHG

## 2020-08-03 DIAGNOSIS — K62.4 ANAL STRICTURE: Primary | ICD-10-CM

## 2020-08-03 DIAGNOSIS — K50.819 CROHN'S DISEASE OF SMALL AND LARGE INTESTINES WITH COMPLICATION: ICD-10-CM

## 2020-08-03 DIAGNOSIS — K50.819 CROHN'S DISEASE OF SMALL AND LARGE INTESTINES WITH COMPLICATION: Primary | ICD-10-CM

## 2020-08-03 DIAGNOSIS — K50.018 CROHN'S DISEASE OF SMALL INTESTINE WITH OTHER COMPLICATION: ICD-10-CM

## 2020-08-03 PROCEDURE — 86480 TB TEST CELL IMMUN MEASURE: CPT

## 2020-08-03 PROCEDURE — 3008F PR BODY MASS INDEX (BMI) DOCUMENTED: ICD-10-PCS | Mod: CPTII,S$GLB,, | Performed by: INTERNAL MEDICINE

## 2020-08-03 PROCEDURE — 99213 OFFICE O/P EST LOW 20 MIN: CPT | Mod: S$GLB,,, | Performed by: COLON & RECTAL SURGERY

## 2020-08-03 PROCEDURE — 99214 OFFICE O/P EST MOD 30 MIN: CPT | Mod: S$GLB,,, | Performed by: INTERNAL MEDICINE

## 2020-08-03 PROCEDURE — 3008F BODY MASS INDEX DOCD: CPT | Mod: CPTII,S$GLB,, | Performed by: COLON & RECTAL SURGERY

## 2020-08-03 PROCEDURE — 3008F BODY MASS INDEX DOCD: CPT | Mod: CPTII,S$GLB,, | Performed by: INTERNAL MEDICINE

## 2020-08-03 PROCEDURE — 99214 PR OFFICE/OUTPT VISIT, EST, LEVL IV, 30-39 MIN: ICD-10-PCS | Mod: S$GLB,,, | Performed by: INTERNAL MEDICINE

## 2020-08-03 PROCEDURE — 99999 PR PBB SHADOW E&M-EST. PATIENT-LVL III: ICD-10-PCS | Mod: PBBFAC,,, | Performed by: COLON & RECTAL SURGERY

## 2020-08-03 PROCEDURE — 99999 PR PBB SHADOW E&M-EST. PATIENT-LVL III: ICD-10-PCS | Mod: PBBFAC,,, | Performed by: INTERNAL MEDICINE

## 2020-08-03 PROCEDURE — 99999 PR PBB SHADOW E&M-EST. PATIENT-LVL III: CPT | Mod: PBBFAC,,, | Performed by: INTERNAL MEDICINE

## 2020-08-03 PROCEDURE — 99213 PR OFFICE/OUTPT VISIT, EST, LEVL III, 20-29 MIN: ICD-10-PCS | Mod: S$GLB,,, | Performed by: COLON & RECTAL SURGERY

## 2020-08-03 PROCEDURE — 3008F PR BODY MASS INDEX (BMI) DOCUMENTED: ICD-10-PCS | Mod: CPTII,S$GLB,, | Performed by: COLON & RECTAL SURGERY

## 2020-08-03 PROCEDURE — 99999 PR PBB SHADOW E&M-EST. PATIENT-LVL III: CPT | Mod: PBBFAC,,, | Performed by: COLON & RECTAL SURGERY

## 2020-08-03 RX ORDER — CLOTRIMAZOLE AND BETAMETHASONE DIPROPIONATE 10; .64 MG/G; MG/G
CREAM TOPICAL
COMMUNITY
End: 2020-08-03

## 2020-08-03 RX ORDER — ESTRADIOL 0.1 MG/G
CREAM VAGINAL
COMMUNITY
End: 2020-08-03

## 2020-08-03 RX ORDER — CLOBETASOL PROPIONATE 0.5 MG/G
OINTMENT TOPICAL
COMMUNITY
End: 2020-08-03

## 2020-08-03 RX ORDER — PREDNISOLONE SODIUM PHOSPHATE 10 MG/1
TABLET, ORALLY DISINTEGRATING ORAL
COMMUNITY
End: 2020-08-03

## 2020-08-03 RX ORDER — HYDROCODONE BITARTRATE AND ACETAMINOPHEN 5; 325 MG/1; MG/1
TABLET ORAL
COMMUNITY
End: 2020-08-03

## 2020-08-03 RX ORDER — ONDANSETRON HYDROCHLORIDE 8 MG/1
TABLET, FILM COATED ORAL
COMMUNITY
End: 2020-08-03

## 2020-08-03 RX ORDER — LORATADINE AND PSEUDOEPHEDRINE SULFATE 5; 120 MG/1; MG/1
1 TABLET, EXTENDED RELEASE ORAL
COMMUNITY
Start: 2020-05-11 | End: 2022-02-15

## 2020-08-03 NOTE — PROGRESS NOTES
REASON FOR VISIT:  Fistulizing Crohn disease.     HPI:   Ms. Fuller is a 60 yr old who was last seen in clinic 1 yr ago. She was last seen for colonoscopy in December and January for endoscopy. She has longstanding history of perianal Crohn's disease.  She has been doing really   well clinically since she was transitioned from 5 mg/kg body weight every six to 10 mg/kg body weight every six weeks.  This titration was based on infliximab titers and negative antibodies.   October 2018 she had repeat infliximab levels with antibodies revealing steady levels of infliximab trough with no antibodies.  She has not been having any perianal fistula drainage.  Her bowels have been moving better, although she was having some   constipation which she manages with increased water intake.  She is not currently using MiraLax.  She underwent an exam under anesthesia with Dr. Aparicio and an anal dilation was done and anal biopsies were negative for dysplasia.    She does have intermittent solid food dysphagia and has previously undergone esophageal dilation in 2018.  She underwent dilation of her esophagus to 12 mm in January with some improvement.  Colonoscopy by Dr. Barbour was incomplete due to tortuous esophagus.  The evaluated areas of colon were unremarkable.  Digital dilation was done for anal stenosis.    She does have chronic fatigue and anemia of chronic disease with her hemoglobin being stable.  She follows up with her primary care physician who keeps up with her influenza and pneumococcal vaccines.  Her endocrinologist monitors her vitamin-D level.  She gets q.6 months eye exams and has no history of iritis or uveitis.  She has also been seen dermatologist once a year with no evidence of skin cancer. She is also following with Ob Gyn  for pap smears.     PAST MEDICAL, SURGICAL, SOCIAL AND FAMILY HISTORY:  Reviewed.     MEDICATIONS AND ALLERGIES:  Reviewed.     REVIEW OF SYSTEMS:  CONSTITUTIONAL:  No fever, no chills.   Weight gain.  Appetite is normal.  EYES:  No visual changes, no red, painful eyes.  ENT:  No odynophagia or hoarseness of voice.  Does have ear fullness bilaterally   and is seeing a local ENT physician.  CARDIOVASCULAR:  No angina or palpitation.  RESPIRATORY:  No shortness of breath or wheezing.  GENITOURINARY:  No dysuria.  MUSCULOSKELETAL:  No myalgias.  Some arthralgia around her knees and ankle.  SKIN:  No pruritus or eczema.  NEUROLOGIC:  No headache, no seizures.  PSYCHIATRIC:  No anxiety or depression.  GASTROINTESTINAL:  See HPI.  No blood in the stool.     PHYSICAL EXAMINATION:  VITAL SIGNS:  See EPIC.  GENERAL:  Awake, alert and oriented x3, no acute distress.  NECK:  Supple, no carotid bruit.  No cervical adenopathy.  ABDOMEN:  Obese, soft, nontender, nondistended.  No masses palpable.  No   hepatosplenomegaly appreciated.  Bowel sounds are normal.  No abdominal bruits   heard.  EYES:  Conjunctivae anicteric.  ENT:  Oropharynx, mucosa moist.  Mallampati 3.  CARDIOVASCULAR:  S1, S2 normal.  RESPIRATORY:  Bilateral air entry equal.  SKIN:  No palmar erythema or spider angioma.  NEUROLOGIC:  No asterixis.  PSYCHIATRY:  Affect appropriate.  LOWER EXTREMITY:  No pedal edema.    25 minutes spent with  with more than 50 % in counseling.    Most recent CBC, CMP from June reviewed.     IMPRESSION:  1.  Perianal Crohn disease - currently inactive with anal stenosis (s/p dilation under anesthesia) and stenosis   at the ileocolic anastomosis (stable per recent MRE) in clinical remission on   Remicade 10 mg/kg every six weeks.  2.  Intermittent solid food dysphagia s/p esophageal dilation. Call my office if in need of repeat endoscopy.        RECOMMENDATIONS:  1.  Schedule EGD and colonoscopy in fall.  2. QuantiFERON Gold TB test, CRP level

## 2020-08-03 NOTE — LETTER
August 15, 2020      Jamie Polo MD  [address]  [facility] [address]           [facility]  [address]  [facility] [address]  Phone: [phone]  Fax: [phone]          Patient: Aline Fuller   MR Number: [id number]   YOB: 1960   Date of Visit: 8/3/2020       Dear Dr Polo:    Thank you for referring Aline Fuller to me for evaluation. Attached you will find relevant portions of my assessment and plan of care.    If you have questions, please do not hesitate to call me. I look forward to following Aline Fuller along with you.    Sincerely,    Rhett Aparicio MD    Enclosure  CC:  No Recipients    If you would like to receive this communication electronically, please contact [email or web address] or [phone] to request more information on [employer] Link access.    For providers and/or their staff who would like to refer a patient to [facility], please contact us through our one-stop-shop provider referral line, [place], at [phone].    If you feel you have received this communication in error or would no longer like to receive these types of communications, please e-mail [email or web address]

## 2020-08-04 ENCOUNTER — TELEPHONE (OUTPATIENT)
Dept: GASTROENTEROLOGY | Facility: CLINIC | Age: 60
End: 2020-08-04

## 2020-08-04 NOTE — TELEPHONE ENCOUNTER
Spoke with patient , results given as written by Dr. Polo  Pt verbalizes understadning and appreciates the call.  Thanks MA

## 2020-08-04 NOTE — TELEPHONE ENCOUNTER
----- Message from Jamie Polo MD sent at 8/4/2020  7:33 AM CDT -----  Inflammation marker is normal.

## 2020-08-05 ENCOUNTER — TELEPHONE (OUTPATIENT)
Dept: GASTROENTEROLOGY | Facility: CLINIC | Age: 60
End: 2020-08-05

## 2020-08-05 LAB
GAMMA INTERFERON BACKGROUND BLD IA-ACNC: 0.01 IU/ML
M TB IFN-G CD4+ BCKGRND COR BLD-ACNC: 0 IU/ML
MITOGEN IGNF BCKGRD COR BLD-ACNC: 6.18 IU/ML
TB GOLD PLUS: NEGATIVE
TB2 - NIL: 0 IU/ML

## 2020-08-14 ENCOUNTER — TELEPHONE (OUTPATIENT)
Dept: GASTROENTEROLOGY | Facility: CLINIC | Age: 60
End: 2020-08-14

## 2020-08-14 NOTE — TELEPHONE ENCOUNTER
----- Message from Rachel Vargas sent at 8/14/2020 11:45 AM CDT -----  Contact: Papi with [employer]  Calling to get the pt's last clinic notes and any results from labs for medication authorization             Please contact Papi with [employer]: [phone]        Can fax to [phone]

## 2020-08-15 NOTE — PROGRESS NOTES
Subjective:       Patient ID: Aline Fuller is a 60 y.o. female.    Chief Complaint: Crohn's Disease    HPI  61 yo F Crohn's disease with an anal stricture.  I performed an EUA with dilation in April 2019 - biopsies at that time showed no evidence of dysplasia.  She presents today for follow-up.  She is having some minor difficulty with her bowel movements, but overall  continues to do much better than she did prior to the dilation last spring.  She is on Remicade every 6 weeks.  No bleeding with bowel movements.  No significant pain with bowel movements.  At present, she does not require laxatives to help her with bowel movements.      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     Breast cancer dxed 4/2014 4/2014 diagnosed, Chemo/XRT completed Nov/Dec 2014, on tamoxifen    Colon polyp     Crohn's disease     Dysphagia, pharyngoesophageal phase     Ocular hypertension     Personal history of colonic polyps     Vitamin B deficiency        Past Surgical History:   Procedure Laterality Date    APPENDECTOMY      BIOPSY OF ANUS  4/10/2019    Procedure: BIOPSY, ANUS;  Surgeon: Rhett Aparicio MD;  Location: [facility] OR [address] [place] [address];  Service: Colon and Rectal;;    BREAST LUMPECTOMY Right     CHOLECYSTECTOMY      colon resections      COLON SURGERY  1972 or 1973, 1979    COLONOSCOPY      COLONOSCOPY N/A 10/24/2016    Procedure: COLONOSCOPY;  Surgeon: Jamie Polo MD;  Location: [address]);  Service: Endoscopy;  Laterality: N/A;    COLONOSCOPY N/A 8/29/2017    Procedure: COLONOSCOPY;  Surgeon: Diamond Barbour MD;  Location: [address]);  Service: Endoscopy;  Laterality: N/A;  schedule as 45 minute case    COLONOSCOPY N/A 10/10/2018    Procedure: COLONOSCOPY;  Surgeon: Diamond Barbour MD;   Location: [facility] (4TH FLR);  Service: Endoscopy;  Laterality: N/A;    COLONOSCOPY N/A 12/19/2019    Procedure: COLONOSCOPY;  Surgeon: Diamond Barbour MD;  Location: [facility] (4TH FLR);  Service: Endoscopy;  Laterality: N/A;    ESOPHAGOGASTRODUODENOSCOPY      ESOPHAGOGASTRODUODENOSCOPY N/A 10/10/2018    Procedure: EGD (ESOPHAGOGASTRODUODENOSCOPY);  Surgeon: Diamond Barbour MD;  Location: [facility] (4TH FLR);  Service: Endoscopy;  Laterality: N/A;    ESOPHAGOGASTRODUODENOSCOPY N/A 12/19/2019    Procedure: EGD (ESOPHAGOGASTRODUODENOSCOPY);  Surgeon: Diamond Barbour MD;  Location: [facility] (4TH FLR);  Service: Endoscopy;  Laterality: N/A;    ESOPHAGOGASTRODUODENOSCOPY N/A 1/29/2020    Procedure: ESOPHAGOGASTRODUODENOSCOPY (EGD);  Surgeon: Jamie Polo MD;  Location: [facility] (4TH FLR);  Service: Endoscopy;  Laterality: N/A;  with Dr. Polo last week of January or early February per Dr. Trevino-BB    EXAMINATION UNDER ANESTHESIA Left 4/10/2019    Procedure: Exam under anesthesia botox;  Surgeon: Rhett Aparicio MD;  Location: [facility] OR 2ND FLR;  Service: Colon and Rectal;  Laterality: Left;  S/P- Medi port placed to upper left chest    fistula repairs      KNEE SURGERY  left    SMALL INTESTINE SURGERY      1997    UPPER GASTROINTESTINAL ENDOSCOPY         Current Outpatient Medications   Medication Sig Dispense Refill    amLODIPine (NORVASC) 2.5 MG tablet Take 2.5 mg by mouth once daily.      bisacodyl (DULCOLAX) 5 mg EC tablet Take 5 mg by mouth daily as needed for Constipation.      cholecalciferol, vitamin D3, 5,000 unit capsule Take 5,000 Units by mouth 2 (two) times daily. 1 Capsule Oral Every day      cyanocobalamin 1,000 mcg/mL injection 1,000 mcg every 30 days.   1    fluticasone (FLONASE) 50 mcg/actuation nasal spray 1 spray by Each Nare route daily as needed.   0    INFLIXIMAB (REMICADE IV) Inject 10 mg/kg into the vein every 6 weeks.       tamoxifen (NOLVADEX) 20 MG Tab Take 20 mg by  mouth every evening .      CLARITIN-D 12 HOUR 5-120 mg per tablet       inFLIXimab (REMICADE) 100 mg injection Inject into the vein.       No current facility-administered medications for this visit.      Facility-Administered Medications Ordered in Other Visits   Medication Dose Route Frequency Provider Last Rate Last Dose    0.9%  NaCl infusion   Intravenous Continuous Marcia Noel NP 0 mL/hr at 04/10/19 0846      lidocaine (PF) 10 mg/ml (1%) injection 10 mg  1 mL Intradermal Once Marcia Noel NP        mupirocin 2 % ointment   Nasal On Call Procedure Marcia Noel NP           Family History   Problem Relation Age of Onset    Colon cancer Mother 60    Heart attack Father     Breast cancer Sister     Irritable bowel syndrome Sister     Anesthesia problems Sister     Heart disease Brother     Ovarian cancer Maternal Aunt     Stomach cancer Maternal Uncle 68    Heart disease Paternal Uncle     Pancreatic cancer Maternal Grandmother     Celiac disease Neg Hx     Cirrhosis Neg Hx     Colon polyps Neg Hx     Crohn's disease Neg Hx     Cystic fibrosis Neg Hx     Esophageal cancer Neg Hx     Hemochromatosis Neg Hx     Inflammatory bowel disease Neg Hx     Liver cancer Neg Hx     Liver disease Neg Hx     Rectal cancer Neg Hx     Ulcerative colitis Neg Hx     Gadiel's disease Neg Hx        Social History     Socioeconomic History    Marital status:      Spouse name: Not on file    Number of children: Not on file    Years of education: Not on file    Highest education level: Not on file   Occupational History    Not on file   Social Needs    Financial resource strain: Not on file    Food insecurity     Worry: Not on file     Inability: Not on file    Transportation needs     Medical: Not on file     Non-medical: Not on file   Tobacco Use    Smoking status: Never Smoker    Smokeless tobacco: Never Used   Substance and Sexual Activity    Alcohol use: No     Drug use: No    Sexual activity: Not on file   Lifestyle    Physical activity     Days per week: Not on file     Minutes per session: Not on file    Stress: Not on file   Relationships    Social connections     Talks on phone: Not on file     Gets together: Not on file     Attends Jain service: Not on file     Active member of club or organization: Not on file     Attends meetings of clubs or organizations: Not on file     Relationship status: Not on file   Other Topics Concern    Not on file   Social History Narrative    Insurance        Review of Systems   Constitutional: Negative for chills and fever.   HENT: Negative for congestion and sore throat.    Eyes: Negative for visual disturbance.   Respiratory: Negative for cough and shortness of breath.    Cardiovascular: Negative for chest pain and palpitations.   Gastrointestinal: Negative for abdominal distention, abdominal pain, anal bleeding, blood in stool, constipation, diarrhea, nausea, rectal pain and vomiting.   Endocrine: Negative for cold intolerance and heat intolerance.   Genitourinary: Negative for dysuria and frequency.   Musculoskeletal: Negative for arthralgias, back pain and neck pain.   Skin: Negative for rash.   Allergic/Immunologic: Negative for immunocompromised state.   Neurological: Negative for dizziness, light-headedness and headaches.   Hematological: Does not bruise/bleed easily.   Psychiatric/Behavioral: Negative for confusion. The patient is not nervous/anxious.        Objective:      Physical Exam  Constitutional:       Appearance: She is well-developed.   HENT:      Head: Normocephalic.   Neck:      Musculoskeletal: Normal range of motion and neck supple.   Cardiovascular:      Heart sounds: Normal heart sounds.   Pulmonary:      Effort: Pulmonary effort is normal. No respiratory distress.   Abdominal:      General: Bowel sounds are normal. There is no distension.      Palpations: Abdomen is soft. There is no mass.       Tenderness: There is no abdominal tenderness. There is no guarding or rebound.   Musculoskeletal: Normal range of motion.   Skin:     General: Skin is warm and dry.   Neurological:      Mental Status: She is alert and oriented to person, place, and time.           Lab Results   Component Value Date    WBC 7.28 06/15/2017    HGB 10.6 (L) 06/15/2017    HCT 34.6 (L) 06/15/2017    MCV 90 06/15/2017     06/15/2017     BMP  Lab Results   Component Value Date     04/09/2019    K 3.8 04/09/2019     04/09/2019    CO2 27 04/09/2019    BUN 12 04/09/2019    CREATININE 1.1 04/09/2019    CALCIUM 9.5 04/09/2019    CALCIUM 9.5 04/09/2019    ANIONGAP 6 (L) 04/09/2019    ESTGFRAFRICA >60.0 04/09/2019    EGFRNONAA 54.7 (A) 04/09/2019     CMP  Sodium   Date Value Ref Range Status   04/09/2019 140 136 - 145 mmol/L Final     Potassium   Date Value Ref Range Status   04/09/2019 3.8 3.5 - 5.1 mmol/L Final     Chloride   Date Value Ref Range Status   04/09/2019 107 95 - 110 mmol/L Final     CO2   Date Value Ref Range Status   04/09/2019 27 23 - 29 mmol/L Final     Glucose   Date Value Ref Range Status   04/09/2019 83 70 - 110 mg/dL Final     BUN, Bld   Date Value Ref Range Status   04/09/2019 12 6 - 20 mg/dL Final     Creatinine   Date Value Ref Range Status   04/09/2019 1.1 0.5 - 1.4 mg/dL Final     Calcium   Date Value Ref Range Status   04/09/2019 9.5 8.7 - 10.5 mg/dL Final   04/09/2019 9.5 8.7 - 10.5 mg/dL Final     Total Protein   Date Value Ref Range Status   06/15/2017 7.8 6.0 - 8.4 g/dL Final     Albumin   Date Value Ref Range Status   06/15/2017 2.9 (L) 3.5 - 5.2 g/dL Final     Total Bilirubin   Date Value Ref Range Status   06/15/2017 0.3 0.1 - 1.0 mg/dL Final     Comment:     For infants and newborns, interpretation of results should be based  on gestational age, weight and in agreement with clinical  observations.  Premature Infant recommended reference ranges:  Up to 24 hours.............<8.0 mg/dL  Up to 48  hours............<12.0 mg/dL  3-5 days..................<15.0 mg/dL  6-29 days.................<15.0 mg/dL       Alkaline Phosphatase   Date Value Ref Range Status   06/15/2017 89 55 - 135 U/L Final     AST   Date Value Ref Range Status   06/15/2017 20 10 - 40 U/L Final     ALT   Date Value Ref Range Status   06/15/2017 21 10 - 44 U/L Final     Anion Gap   Date Value Ref Range Status   04/09/2019 6 (L) 8 - 16 mmol/L Final     eGFR if    Date Value Ref Range Status   04/09/2019 >60.0 >60 mL/min/1.73 m^2 Final     eGFR if non    Date Value Ref Range Status   04/09/2019 54.7 (A) >60 mL/min/1.73 m^2 Final     Comment:     Calculation used to obtain the estimated glomerular filtration  rate (eGFR) is the CKD-EPI equation.        No results found for: CEA        Assessment:       1. Anal stricture    2. Crohn's disease of small intestine with other complication        Plan:   Due to concern over significant pain with an anorectal exam, she requested that it be done under sedation.  She currently is hesitant to do anything in the hospital setting in light of the COVID pandemic, because she is on immunosuppressive therapy.  She is scheduled for colonoscopy with Dr. Polo in October.  I told her that I could perform an anorectal examination under anesthesia at the same time and perform repeat dilation of the anal stricture if needed.  I will coordinate this with Dr. Polo.    Rhett Aparicio MD, FACS, FASCRS  Senior Staff Surgeon  Department of Colon & Rectal Surgery     This note was created using voice recognition software, and may contain some unrecognized transcriptional errors.

## 2020-11-10 ENCOUNTER — TELEPHONE (OUTPATIENT)
Dept: GASTROENTEROLOGY | Facility: CLINIC | Age: 60
End: 2020-11-10

## 2020-11-10 NOTE — TELEPHONE ENCOUNTER
MA spoke with Nia with [facility] and she was informed that MD was not at main campus today   And that forms will be faxed on Monday  She verbalized understanding

## 2020-11-10 NOTE — TELEPHONE ENCOUNTER
----- Message from Rachel Vargas sent at 11/10/2020  9:50 AM CST -----  Contact: Nia with [facility]  Calling to speak with staff re the plan of treatment form that was faxed over, state she have not heard from anyone from the office or received the form back       Please contact Nia with [facility]: [phone]

## 2020-11-11 DIAGNOSIS — Z12.11 SPECIAL SCREENING FOR MALIGNANT NEOPLASMS, COLON: Primary | ICD-10-CM

## 2020-11-11 RX ORDER — POLYETHYLENE GLYCOL 3350, SODIUM SULFATE ANHYDROUS, SODIUM BICARBONATE, SODIUM CHLORIDE, POTASSIUM CHLORIDE 236; 22.74; 6.74; 5.86; 2.97 G/4L; G/4L; G/4L; G/4L; G/4L
4 POWDER, FOR SOLUTION ORAL ONCE
Qty: 4000 ML | Refills: 0 | Status: SHIPPED | OUTPATIENT
Start: 2020-11-11 | End: 2020-11-11

## 2020-12-14 ENCOUNTER — ANESTHESIA (OUTPATIENT)
Dept: ENDOSCOPY | Facility: HOSPITAL | Age: 60
End: 2020-12-14
Payer: COMMERCIAL

## 2020-12-14 ENCOUNTER — ANESTHESIA EVENT (OUTPATIENT)
Dept: ENDOSCOPY | Facility: HOSPITAL | Age: 60
End: 2020-12-14
Payer: COMMERCIAL

## 2020-12-14 ENCOUNTER — HOSPITAL ENCOUNTER (OUTPATIENT)
Facility: HOSPITAL | Age: 60
Discharge: HOME | End: 2020-12-14
Attending: INTERNAL MEDICINE | Admitting: INTERNAL MEDICINE
Payer: COMMERCIAL

## 2020-12-14 VITALS
RESPIRATION RATE: 19 BRPM | HEIGHT: 66 IN | OXYGEN SATURATION: 98 % | SYSTOLIC BLOOD PRESSURE: 140 MMHG | HEART RATE: 81 BPM | DIASTOLIC BLOOD PRESSURE: 73 MMHG | WEIGHT: 167 LBS | BODY MASS INDEX: 26.84 KG/M2 | TEMPERATURE: 98 F

## 2020-12-14 DIAGNOSIS — K50.812 CROHN'S DISEASE OF BOTH SMALL AND LARGE INTESTINE WITH INTESTINAL OBSTRUCTION: Primary | ICD-10-CM

## 2020-12-14 DIAGNOSIS — K50.10 CROHN'S COLITIS: ICD-10-CM

## 2020-12-14 LAB — SARS-COV-2 RDRP RESP QL NAA+PROBE: NEGATIVE

## 2020-12-14 PROCEDURE — 43249 PR EGD, FLEX, W/BALL DILATION, < 30MM: ICD-10-PCS | Mod: 51,,, | Performed by: INTERNAL MEDICINE

## 2020-12-14 PROCEDURE — 37000009 HC ANESTHESIA EA ADD 15 MINS: Performed by: INTERNAL MEDICINE

## 2020-12-14 PROCEDURE — U0002 COVID-19 LAB TEST NON-CDC: HCPCS

## 2020-12-14 PROCEDURE — 43249 ESOPH EGD DILATION <30 MM: CPT | Mod: 51,,, | Performed by: INTERNAL MEDICINE

## 2020-12-14 PROCEDURE — 37000008 HC ANESTHESIA 1ST 15 MINUTES: Performed by: INTERNAL MEDICINE

## 2020-12-14 PROCEDURE — E9220 PRA ENDO ANESTHESIA: ICD-10-PCS | Mod: ,,, | Performed by: NURSE ANESTHETIST, CERTIFIED REGISTERED

## 2020-12-14 PROCEDURE — 88305 TISSUE EXAM BY PATHOLOGIST: ICD-10-PCS | Mod: 26,,, | Performed by: PATHOLOGY

## 2020-12-14 PROCEDURE — 45380 COLONOSCOPY AND BIOPSY: CPT | Mod: ,,, | Performed by: INTERNAL MEDICINE

## 2020-12-14 PROCEDURE — 88305 TISSUE EXAM BY PATHOLOGIST: CPT | Mod: 26,,, | Performed by: PATHOLOGY

## 2020-12-14 PROCEDURE — C1726 CATH, BAL DIL, NON-VASCULAR: HCPCS | Performed by: INTERNAL MEDICINE

## 2020-12-14 PROCEDURE — 88305 TISSUE EXAM BY PATHOLOGIST: CPT | Performed by: PATHOLOGY

## 2020-12-14 PROCEDURE — 25000003 PHARM REV CODE 250: Performed by: INTERNAL MEDICINE

## 2020-12-14 PROCEDURE — E9220 PRA ENDO ANESTHESIA: HCPCS | Mod: ,,, | Performed by: NURSE ANESTHETIST, CERTIFIED REGISTERED

## 2020-12-14 PROCEDURE — 25000003 PHARM REV CODE 250: Performed by: STUDENT IN AN ORGANIZED HEALTH CARE EDUCATION/TRAINING PROGRAM

## 2020-12-14 PROCEDURE — 25000003 PHARM REV CODE 250: Performed by: NURSE ANESTHETIST, CERTIFIED REGISTERED

## 2020-12-14 PROCEDURE — 63600175 PHARM REV CODE 636 W HCPCS: Performed by: NURSE ANESTHETIST, CERTIFIED REGISTERED

## 2020-12-14 PROCEDURE — 27201012 HC FORCEPS, HOT/COLD, DISP: Performed by: INTERNAL MEDICINE

## 2020-12-14 PROCEDURE — 45380 PR COLONOSCOPY,BIOPSY: ICD-10-PCS | Mod: ,,, | Performed by: INTERNAL MEDICINE

## 2020-12-14 PROCEDURE — 45380 COLONOSCOPY AND BIOPSY: CPT | Performed by: INTERNAL MEDICINE

## 2020-12-14 PROCEDURE — 43249 ESOPH EGD DILATION <30 MM: CPT | Performed by: INTERNAL MEDICINE

## 2020-12-14 RX ORDER — OXYCODONE AND ACETAMINOPHEN 5; 325 MG/1; MG/1
1 TABLET ORAL ONCE
Status: COMPLETED | OUTPATIENT
Start: 2020-12-14 | End: 2020-12-14

## 2020-12-14 RX ORDER — PROPOFOL 10 MG/ML
VIAL (ML) INTRAVENOUS CONTINUOUS PRN
Status: DISCONTINUED | OUTPATIENT
Start: 2020-12-14 | End: 2020-12-14

## 2020-12-14 RX ORDER — LIDOCAINE HYDROCHLORIDE 20 MG/ML
INJECTION INTRAVENOUS
Status: DISCONTINUED | OUTPATIENT
Start: 2020-12-14 | End: 2020-12-14

## 2020-12-14 RX ORDER — SODIUM CHLORIDE, SODIUM LACTATE, POTASSIUM CHLORIDE, CALCIUM CHLORIDE 600; 310; 30; 20 MG/100ML; MG/100ML; MG/100ML; MG/100ML
INJECTION, SOLUTION INTRAVENOUS CONTINUOUS PRN
Status: DISCONTINUED | OUTPATIENT
Start: 2020-12-14 | End: 2020-12-14

## 2020-12-14 RX ORDER — ACETAMINOPHEN 325 MG/1
650 TABLET ORAL ONCE
Status: COMPLETED | OUTPATIENT
Start: 2020-12-14 | End: 2020-12-14

## 2020-12-14 RX ORDER — FENTANYL CITRATE 50 UG/ML
INJECTION, SOLUTION INTRAMUSCULAR; INTRAVENOUS
Status: DISCONTINUED | OUTPATIENT
Start: 2020-12-14 | End: 2020-12-14

## 2020-12-14 RX ORDER — PROPOFOL 10 MG/ML
VIAL (ML) INTRAVENOUS
Status: DISCONTINUED | OUTPATIENT
Start: 2020-12-14 | End: 2020-12-14

## 2020-12-14 RX ORDER — ACETAMINOPHEN 500 MG
500 TABLET ORAL ONCE
Status: DISCONTINUED | OUTPATIENT
Start: 2020-12-14 | End: 2020-12-14

## 2020-12-14 RX ORDER — SODIUM CHLORIDE 9 MG/ML
INJECTION, SOLUTION INTRAVENOUS CONTINUOUS
Status: DISCONTINUED | OUTPATIENT
Start: 2020-12-14 | End: 2020-12-14 | Stop reason: HOSPADM

## 2020-12-14 RX ADMIN — FENTANYL CITRATE 50 MCG: 50 INJECTION, SOLUTION INTRAMUSCULAR; INTRAVENOUS at 02:12

## 2020-12-14 RX ADMIN — SODIUM CHLORIDE 10 ML/HR: 0.9 INJECTION, SOLUTION INTRAVENOUS at 01:12

## 2020-12-14 RX ADMIN — PROPOFOL 130 MG: 10 INJECTION, EMULSION INTRAVENOUS at 02:12

## 2020-12-14 RX ADMIN — PROPOFOL 150 MCG/KG/MIN: 10 INJECTION, EMULSION INTRAVENOUS at 02:12

## 2020-12-14 RX ADMIN — LIDOCAINE HYDROCHLORIDE 100 MG: 20 INJECTION, SOLUTION INTRAVENOUS at 02:12

## 2020-12-14 RX ADMIN — SODIUM CHLORIDE, SODIUM LACTATE, POTASSIUM CHLORIDE, AND CALCIUM CHLORIDE: 600; 310; 30; 20 INJECTION, SOLUTION INTRAVENOUS at 02:12

## 2020-12-14 RX ADMIN — ACETAMINOPHEN 650 MG: 325 TABLET ORAL at 03:12

## 2020-12-14 RX ADMIN — OXYCODONE HYDROCHLORIDE AND ACETAMINOPHEN 1 TABLET: 5; 325 TABLET ORAL at 03:12

## 2020-12-14 NOTE — TRANSFER OF CARE
"Anesthesia Transfer of Care Note    Patient: Aline Fuller    Procedure(s) Performed: Procedure(s) (LRB):  EGD (ESOPHAGOGASTRODUODENOSCOPY) (N/A)  COLONOSCOPY (N/A)    Patient location: GI    Anesthesia Type: general    Transport from OR: Transported from OR on 2-3 L/min O2 by NC with adequate spontaneous ventilation    Post pain: adequate analgesia    Post assessment: no apparent anesthetic complications and tolerated procedure well    Post vital signs: stable    Level of consciousness: sedated and responds to stimulation    Nausea/Vomiting: no nausea/vomiting    Complications: none    Transfer of care protocol was followed      Last vitals:   Visit Vitals  BP (!) 172/85 (BP Location: Left arm, Patient Position: Lying)   Pulse 104   Temp 36.6 °C (97.9 °F) (Temporal)   Resp 16   Ht 5' 6" (1.676 m)   Wt 75.8 kg (167 lb)   SpO2 98%   Breastfeeding No   BMI 26.95 kg/m²     "

## 2020-12-14 NOTE — ANESTHESIA PREPROCEDURE EVALUATION
12/14/2020  Aline Fuller is a 60 y.o., female.    Anesthesia Evaluation    I have reviewed the Patient Summary Reports.      I have reviewed the Medications.     Review of Systems  Anesthesia Hx:  No problems with previous Anesthesia Denies Hx of Anesthetic complications  Denies Family Hx of Anesthesia complications.   Denies Personal Hx of Anesthesia complications.   Social:  Non-Smoker    Hematology/Oncology:  Hematology Normal       -- Cancer in past history:    EENT/Dental:EENT/Dental Normal   Cardiovascular:  Cardiovascular Normal Exercise tolerance: good  ECG has been reviewed.    Pulmonary:  Pulmonary Normal    Renal/:  Renal/ Normal     Hepatic/GI:  Hepatic/GI Normal    Musculoskeletal:  Musculoskeletal Normal    Neurological:  Neurology Normal    Endocrine:  Endocrine Normal    Dermatological:  Skin Normal    Psych:  Psychiatric Normal           Physical Exam  General:  Well nourished    Airway/Jaw/Neck:  Airway Findings: Mouth Opening: Normal Tongue: Normal  General Airway Assessment: Adult  Mallampati: II  TM Distance: Normal, at least 6 cm      Dental:  Dental Findings: In tact   Chest/Lungs:  Chest/Lungs Findings: Clear to auscultation              Anesthesia Plan  Type of Anesthesia, risks & benefits discussed:  Anesthesia Type:  general  Patient's Preference:   Intra-op Monitoring Plan:   Intra-op Monitoring Plan Comments:   Post Op Pain Control Plan:   Post Op Pain Control Plan Comments:   Induction:   IV  Beta Blocker:  Patient is not currently on a Beta-Blocker (No further documentation required).       Informed Consent: Patient understands risks and agrees with Anesthesia plan.  Questions answered. Anesthesia consent signed with patient.  ASA Score: 2     Day of Surgery Review of History & Physical:            Ready For Surgery From Anesthesia Perspective.

## 2020-12-14 NOTE — [employer] PATIENT INSTRUCTIONS
Discharge Summary/Instructions after an Endoscopic Procedure  Patient Name: Aline uFller  Patient MRN: [id number]  Patient YOB: 1960 Monday, December 14, 2020  Jamie Polo MD  RESTRICTIONS:  During your procedure today, you received medications for sedation.  These   medications may affect your judgment, balance and coordination.  Therefore,   for 24 hours, you have the following restrictions:   - DO NOT drive a car, operate machinery, make legal/financial decisions,   sign important papers or drink alcohol.    ACTIVITY:  Today: no heavy lifting, straining or running due to procedural   sedation/anesthesia.  The following day: return to full activity including work.  DIET:  Eat and drink normally unless instructed otherwise.     TREATMENT FOR COMMON SIDE EFFECTS:  - Mild abdominal pain, nausea, belching, bloating or excessive gas:  rest,   eat lightly and use a heating pad.  - Sore Throat: treat with throat lozenges and/or gargle with warm salt   water.  - Because air was used during the procedure, expelling large amounts of air   from your rectum or belching is normal.  - If a bowel prep was taken, you may not have a bowel movement for 1-3 days.    This is normal.  SYMPTOMS TO WATCH FOR AND REPORT TO YOUR PHYSICIAN:  1. Abdominal pain or bloating, other than gas cramps.  2. Chest pain.  3. Back pain.  4. Signs of infection such as: chills or fever occurring within 24 hours   after the procedure.  5. Rectal bleeding, which would show as bright red, maroon, or black stools.   (A tablespoon of blood from the rectum is not serious, especially if   hemorrhoids are present.)  6. Vomiting.  7. Weakness or dizziness.  GO DIRECTLY TO THE NEAREST EMERGENCY ROOM IF YOU HAVE ANY OF THE FOLLOWING:      Difficulty breathing              Chills and/or fever over 101 F   Persistent vomiting and/or vomiting blood   Severe abdominal pain   Severe chest pain   Black, tarry stools   Bleeding- more than one  tablespoon   Any other symptom or condition that you feel may need urgent attention  Your doctor recommends these additional instructions:  If any biopsies were taken, your doctors clinic will contact you in 1 to 2   weeks with any results.  - Patient has a contact number available for emergencies.  The signs and   symptoms of potential delayed complications were discussed with the   patient.  Return to normal activities tomorrow.  Written discharge   instructions were provided to the patient.   - Discharge patient to home.   - Resume previous diet.   - Continue present medications.   - Await pathology results.   - Repeat colonoscopy in 1 year.   For questions, problems or results please call your physician - Jamie Polo MD at Work:  [phone].  [facility], EMERGENCY ROOM PHONE NUMBER: [phone]  IF A COMPLICATION OR EMERGENCY SITUATION ARISES AND YOU ARE UNABLE TO REACH   YOUR PHYSICIAN - GO DIRECTLY TO THE EMERGENCY ROOM.  Jamie Polo MD  12/14/2020 3:00:12 PM  This report has been verified and signed electronically.  [employer]

## 2020-12-14 NOTE — [employer]ATION PATIENT INSTRUCTIONS
Discharge Summary/Instructions after an Endoscopic Procedure  Patient Name: Aline Fuller  Patient MRN: [id number]  Patient YOB: 1960 Monday, December 14, 2020  Jamie Polo MD  RESTRICTIONS:  During your procedure today, you received medications for sedation.  These   medications may affect your judgment, balance and coordination.  Therefore,   for 24 hours, you have the following restrictions:   - DO NOT drive a car, operate machinery, make legal/financial decisions,   sign important papers or drink alcohol.    ACTIVITY:  Today: no heavy lifting, straining or running due to procedural   sedation/anesthesia.  The following day: return to full activity including work.  DIET:  Eat and drink normally unless instructed otherwise.     TREATMENT FOR COMMON SIDE EFFECTS:  - Mild abdominal pain, nausea, belching, bloating or excessive gas:  rest,   eat lightly and use a heating pad.  - Sore Throat: treat with throat lozenges and/or gargle with warm salt   water.  - Because air was used during the procedure, expelling large amounts of air   from your rectum or belching is normal.  - If a bowel prep was taken, you may not have a bowel movement for 1-3 days.    This is normal.  SYMPTOMS TO WATCH FOR AND REPORT TO YOUR PHYSICIAN:  1. Abdominal pain or bloating, other than gas cramps.  2. Chest pain.  3. Back pain.  4. Signs of infection such as: chills or fever occurring within 24 hours   after the procedure.  5. Rectal bleeding, which would show as bright red, maroon, or black stools.   (A tablespoon of blood from the rectum is not serious, especially if   hemorrhoids are present.)  6. Vomiting.  7. Weakness or dizziness.  GO DIRECTLY TO THE NEAREST EMERGENCY ROOM IF YOU HAVE ANY OF THE FOLLOWING:      Difficulty breathing              Chills and/or fever over 101 F   Persistent vomiting and/or vomiting blood   Severe abdominal pain   Severe chest pain   Black, tarry stools   Bleeding- more than one  tablespoon   Any other symptom or condition that you feel may need urgent attention  Your doctor recommends these additional instructions:  If any biopsies were taken, your doctors clinic will contact you in 1 to 2   weeks with any results.  - Patient has a contact number available for emergencies.  The signs and   symptoms of potential delayed complications were discussed with the   patient.  Return to normal activities tomorrow.  Written discharge   instructions were provided to the patient.   - Discharge patient to home.   - Resume previous diet.   - Continue present medications.   For questions, problems or results please call your physician - Jamie Polo MD at Work:  [phone].  [facility], EMERGENCY ROOM PHONE NUMBER: [phone]  IF A COMPLICATION OR EMERGENCY SITUATION ARISES AND YOU ARE UNABLE TO REACH   YOUR PHYSICIAN - GO DIRECTLY TO THE EMERGENCY ROOM.  Jamie Polo MD  12/14/2020 2:23:31 PM  This report has been verified and signed electronically.  [employer]

## 2020-12-14 NOTE — H&P
Short Stay Endoscopy History and Physical    PCP - Jamie Polo MD    Procedure - EGD/Colonoscopy  ASA - per anesthesia  Mallampati - per anesthesia  History of Anesthesia problems - no  Family history Anesthesia problems -  no     HPI:  This is a 60 y.o. female here for evaluation of : Dysphagia and Crohn's disease    Reflux - no  Dysphagia - occ  Abdominal pain - occ  Diarrhea - no    ROS:  Constitutional: No fevers, chills, No weight loss  ENT: No allergies  CV: No chest pain  Pulm: No cough, No shortness of breath  Ophtho: No vision changes  GI: see HPI  Medical History:  has a past medical history of Allergic rhinitis, Anemia, Arthritis in Crohn's disease, Breast cancer (dxed 4/2014), Colon polyp, Crohn's disease, Dysphagia, pharyngoesophageal phase, Ocular hypertension, Personal history of colonic polyps, and Vitamin B deficiency.    Surgical History:  has a past surgical history that includes Colonoscopy; Esophagogastroduodenoscopy; colon resections; Appendectomy; Knee surgery (left); Breast lumpectomy (Right); fistula repairs; Colonoscopy (N/A, 10/24/2016); Upper gastrointestinal endoscopy; Cholecystectomy; Small intestine surgery; Colon surgery (1972 or 1973, 1979); Colonoscopy (N/A, 8/29/2017); Esophagogastroduodenoscopy (N/A, 10/10/2018); Colonoscopy (N/A, 10/10/2018); Examination under anesthesia (Left, 4/10/2019); Biopsy of anus (4/10/2019); Esophagogastroduodenoscopy (N/A, 12/19/2019); Colonoscopy (N/A, 12/19/2019); and Esophagogastroduodenoscopy (N/A, 1/29/2020).    Family History: family history includes Anesthesia problems in her sister; Breast cancer in her sister; Colon cancer (age of onset: 60) in her mother; Heart attack in her father; Heart disease in her brother and paternal uncle; Irritable bowel syndrome in her sister; Ovarian cancer in her maternal aunt; Pancreatic cancer in her maternal grandmother; Stomach cancer (age of onset: 68) in her maternal uncle.. Otherwise no colon cancer,  inflammatory bowel disease, or GI malignancies.    Social History:  reports that she has never smoked. She has never used smokeless tobacco. She reports that she does not drink alcohol or use drugs.    Review of patient's allergies indicates:   Allergen Reactions    Sulfa (sulfonamide antibiotics) Hives     Other reaction(s): Rash       Medications:   Medications Prior to Admission   Medication Sig Dispense Refill Last Dose    amLODIPine (NORVASC) 2.5 MG tablet Take 2.5 mg by mouth once daily.       bisacodyl (DULCOLAX) 5 mg EC tablet Take 5 mg by mouth daily as needed for Constipation.       cholecalciferol, vitamin D3, 5,000 unit capsule Take 5,000 Units by mouth 2 (two) times daily. 1 Capsule Oral Every day       CLARITIN-D 12 HOUR 5-120 mg per tablet        cyanocobalamin 1,000 mcg/mL injection 1,000 mcg every 30 days.   1     fluticasone (FLONASE) 50 mcg/actuation nasal spray 1 spray by Each Nare route daily as needed.   0     INFLIXIMAB (REMICADE IV) Inject 10 mg/kg into the vein every 6 weeks.        inFLIXimab (REMICADE) 100 mg injection Inject into the vein.       tamoxifen (NOLVADEX) 20 MG Tab Take 20 mg by mouth every evening .          Objective Findings:    Vital Signs: Per nursing notes.    Physical Exam:  General Appearance: Well appearing in no acute distress  Head:   Normocephalic, without obvious abnormality  Eyes:    No scleral icterus  Airway: Open  Neck: No restriction in mobility  Lungs: CTA bilaterally in anterior and posterior fields, no wheezes, no crackles.  Heart:  Regular rate and rhythm, S1, S2 normal, no murmurs heard  Abdomen: Soft, non tender, non distended      Labs:  Lab Results   Component Value Date    WBC 7.28 06/15/2017    HGB 10.6 (L) 06/15/2017    HCT 34.6 (L) 06/15/2017     06/15/2017    ALT 21 06/15/2017    AST 20 06/15/2017     04/09/2019    K 3.8 04/09/2019     04/09/2019    CREATININE 1.1 04/09/2019    BUN 12 04/09/2019    CO2 27 04/09/2019          I have explained the risks and benefits of endoscopy procedures to the patient including but not limited to bleeding, perforation, infection, and death.    Thank you so much for allowing me to participate in the care of Aline Polo MD

## 2020-12-14 NOTE — ANESTHESIA POSTPROCEDURE EVALUATION
Anesthesia Post Evaluation    Patient: Aline Fuller    Procedure(s) Performed: Procedure(s) (LRB):  EGD (ESOPHAGOGASTRODUODENOSCOPY) (N/A)  COLONOSCOPY (N/A)    Final Anesthesia Type: general      Patient location during evaluation: PACU  Patient participation: Yes- Able to Participate  Level of consciousness: awake and alert  Post-procedure vital signs: reviewed and stable  Pain management: adequate  Airway patency: patent    PONV status at discharge: No PONV  Anesthetic complications: no      Cardiovascular status: stable  Respiratory status: unassisted, room air and spontaneous ventilation  Hydration status: euvolemic  Follow-up not needed.          Vitals Value Taken Time   /69 12/14/20 1531   Temp 36.5 °C (97.7 °F) 12/14/20 1503   Pulse 80 12/14/20 1531   Resp 18 12/14/20 1536   SpO2 100 % 12/14/20 1531         No case tracking events are documented in the log.      Pain/Jil Score: Pain Rating Prior to Med Admin: 8 (12/14/2020  3:38 PM)

## 2020-12-17 LAB
FINAL PATHOLOGIC DIAGNOSIS: NORMAL
GROSS: NORMAL
Lab: NORMAL

## 2020-12-21 ENCOUNTER — TELEPHONE (OUTPATIENT)
Dept: GASTROENTEROLOGY | Facility: CLINIC | Age: 60
End: 2020-12-21

## 2020-12-21 NOTE — TELEPHONE ENCOUNTER
----- Message from Naty Montano sent at 12/21/2020  3:54 PM CST -----  ALEXANDRA VILLALOBOS calling [facility]  [phone]

## 2020-12-21 NOTE — TELEPHONE ENCOUNTER
----- Message from Jamie Polo MD sent at 12/21/2020  9:35 AM CST -----  Biopsies look fine. Follow up in 6 months.

## 2021-02-02 ENCOUNTER — TELEPHONE (OUTPATIENT)
Dept: GASTROENTEROLOGY | Facility: CLINIC | Age: 61
End: 2021-02-02

## 2021-02-20 NOTE — TELEPHONE ENCOUNTER
----- Message from Naty Montano sent at 4/15/2019  3:42 PM CDT -----  Contact: pt [phone]  Needs Advice    Reason for call: pt called stated she never rec'd her Rx for oxyCODONE-acetaminophen (PERCOCET) 5-325 mg per tablet. She stated she been taking tylenol but was to make sure she is not taking too much tylenol.  She also she feel a lil pain now, when she need to go to bathroom. Please advise pt about tylenol and what happened to her Rx for the Percocet        Communication Preference: [phone]    Additional Information:    
Spoke with patient. States did not get Percocet RX filled. Taking Tylenol ES as recommended per packaging.   
Statement Selected

## 2021-04-30 PROCEDURE — 99358 PROLONG SERVICE W/O CONTACT: CPT | Mod: ,,, | Performed by: INTERNAL MEDICINE

## 2021-04-30 PROCEDURE — 99358 PR PROLONGED SERV,NO CONTACT,1ST HR: ICD-10-PCS | Mod: ,,, | Performed by: INTERNAL MEDICINE

## 2021-05-03 ENCOUNTER — TELEPHONE (OUTPATIENT)
Dept: GASTROENTEROLOGY | Facility: CLINIC | Age: 61
End: 2021-05-03

## 2021-05-03 ENCOUNTER — CLINICAL SUPPORT (OUTPATIENT)
Dept: INFECTIOUS DISEASES | Facility: CLINIC | Age: 61
End: 2021-05-03
Payer: COMMERCIAL

## 2021-05-03 ENCOUNTER — OFFICE VISIT (OUTPATIENT)
Dept: GASTROENTEROLOGY | Facility: CLINIC | Age: 61
End: 2021-05-03
Payer: COMMERCIAL

## 2021-05-03 ENCOUNTER — LAB VISIT (OUTPATIENT)
Dept: LAB | Facility: HOSPITAL | Age: 61
End: 2021-05-03
Attending: INTERNAL MEDICINE
Payer: COMMERCIAL

## 2021-05-03 VITALS
TEMPERATURE: 98 F | HEIGHT: 66 IN | RESPIRATION RATE: 16 BRPM | DIASTOLIC BLOOD PRESSURE: 88 MMHG | SYSTOLIC BLOOD PRESSURE: 159 MMHG | OXYGEN SATURATION: 97 % | BODY MASS INDEX: 26.4 KG/M2 | HEART RATE: 99 BPM | WEIGHT: 164.25 LBS

## 2021-05-03 DIAGNOSIS — K50.818 CROHN'S DISEASE OF BOTH SMALL AND LARGE INTESTINE WITH OTHER COMPLICATION: ICD-10-CM

## 2021-05-03 DIAGNOSIS — K50.80 CROHN'S DISEASE OF BOTH SMALL AND LARGE INTESTINE WITHOUT COMPLICATION: Primary | ICD-10-CM

## 2021-05-03 DIAGNOSIS — K50.80 CROHN'S DISEASE OF BOTH SMALL AND LARGE INTESTINE WITHOUT COMPLICATION: ICD-10-CM

## 2021-05-03 DIAGNOSIS — D84.9 IMMUNOSUPPRESSED STATUS: ICD-10-CM

## 2021-05-03 DIAGNOSIS — D50.0 IRON DEFICIENCY ANEMIA DUE TO CHRONIC BLOOD LOSS: Primary | ICD-10-CM

## 2021-05-03 DIAGNOSIS — E53.8 VITAMIN B12 DEFICIENCY: ICD-10-CM

## 2021-05-03 LAB
ALBUMIN SERPL BCP-MCNC: 3.6 G/DL (ref 3.5–5.2)
ALP SERPL-CCNC: 100 U/L (ref 55–135)
ALT SERPL W/O P-5'-P-CCNC: 19 U/L (ref 10–44)
ANION GAP SERPL CALC-SCNC: 7 MMOL/L (ref 8–16)
AST SERPL-CCNC: 26 U/L (ref 10–40)
BASOPHILS # BLD AUTO: 0.02 K/UL (ref 0–0.2)
BASOPHILS NFR BLD: 0.2 % (ref 0–1.9)
BILIRUB SERPL-MCNC: 0.4 MG/DL (ref 0.1–1)
BUN SERPL-MCNC: 13 MG/DL (ref 8–23)
CALCIUM SERPL-MCNC: 9.7 MG/DL (ref 8.7–10.5)
CHLORIDE SERPL-SCNC: 107 MMOL/L (ref 95–110)
CO2 SERPL-SCNC: 25 MMOL/L (ref 23–29)
CREAT SERPL-MCNC: 1.2 MG/DL (ref 0.5–1.4)
CRP SERPL-MCNC: 3.5 MG/L (ref 0–8.2)
DIFFERENTIAL METHOD: ABNORMAL
EOSINOPHIL # BLD AUTO: 0.5 K/UL (ref 0–0.5)
EOSINOPHIL NFR BLD: 5.6 % (ref 0–8)
ERYTHROCYTE [DISTWIDTH] IN BLOOD BY AUTOMATED COUNT: 12.3 % (ref 11.5–14.5)
EST. GFR  (AFRICAN AMERICAN): 56 ML/MIN/1.73 M^2
EST. GFR  (NON AFRICAN AMERICAN): 48.6 ML/MIN/1.73 M^2
FERRITIN SERPL-MCNC: 1048 NG/ML (ref 20–300)
GLUCOSE SERPL-MCNC: 93 MG/DL (ref 70–110)
HCG INTACT+B SERPL-ACNC: <1.2 MIU/ML
HCT VFR BLD AUTO: 33.5 % (ref 37–48.5)
HGB BLD-MCNC: 10.3 G/DL (ref 12–16)
IGA SERPL-MCNC: 744 MG/DL (ref 40–350)
IMM GRANULOCYTES # BLD AUTO: 0.02 K/UL (ref 0–0.04)
IMM GRANULOCYTES NFR BLD AUTO: 0.2 % (ref 0–0.5)
IRON SERPL-MCNC: 61 UG/DL (ref 30–160)
LYMPHOCYTES # BLD AUTO: 3.7 K/UL (ref 1–4.8)
LYMPHOCYTES NFR BLD: 43.8 % (ref 18–48)
MCH RBC QN AUTO: 28.5 PG (ref 27–31)
MCHC RBC AUTO-ENTMCNC: 30.7 G/DL (ref 32–36)
MCV RBC AUTO: 93 FL (ref 82–98)
MONOCYTES # BLD AUTO: 0.6 K/UL (ref 0.3–1)
MONOCYTES NFR BLD: 7.3 % (ref 4–15)
NEUTROPHILS # BLD AUTO: 3.6 K/UL (ref 1.8–7.7)
NEUTROPHILS NFR BLD: 42.9 % (ref 38–73)
NRBC BLD-RTO: 0 /100 WBC
PLATELET # BLD AUTO: 306 K/UL (ref 150–450)
PLATELET BLD QL SMEAR: ABNORMAL
PMV BLD AUTO: 8.8 FL (ref 9.2–12.9)
POTASSIUM SERPL-SCNC: 3.6 MMOL/L (ref 3.5–5.1)
PROT SERPL-MCNC: 9.1 G/DL (ref 6–8.4)
RBC # BLD AUTO: 3.62 M/UL (ref 4–5.4)
SATURATED IRON: 19 % (ref 20–50)
SODIUM SERPL-SCNC: 139 MMOL/L (ref 136–145)
T4 FREE SERPL-MCNC: 1.07 NG/DL (ref 0.71–1.51)
TOTAL IRON BINDING CAPACITY: 323 UG/DL (ref 250–450)
TRANSFERRIN SERPL-MCNC: 218 MG/DL (ref 200–375)
TSH SERPL DL<=0.005 MIU/L-ACNC: 3.84 UIU/ML (ref 0.4–4)
VIT B12 SERPL-MCNC: >2000 PG/ML (ref 210–950)
WBC # BLD AUTO: 8.34 K/UL (ref 3.9–12.7)

## 2021-05-03 PROCEDURE — 1126F PR PAIN SEVERITY QUANTIFIED, NO PAIN PRESENT: ICD-10-PCS | Mod: S$GLB,,, | Performed by: INTERNAL MEDICINE

## 2021-05-03 PROCEDURE — 85025 COMPLETE CBC W/AUTO DIFF WBC: CPT | Performed by: INTERNAL MEDICINE

## 2021-05-03 PROCEDURE — 82607 VITAMIN B-12: CPT | Performed by: INTERNAL MEDICINE

## 2021-05-03 PROCEDURE — 90471 IMMUNIZATION ADMIN: CPT | Mod: S$GLB,,, | Performed by: INTERNAL MEDICINE

## 2021-05-03 PROCEDURE — 3008F PR BODY MASS INDEX (BMI) DOCUMENTED: ICD-10-PCS | Mod: CPTII,S$GLB,, | Performed by: INTERNAL MEDICINE

## 2021-05-03 PROCEDURE — 83516 IMMUNOASSAY NONANTIBODY: CPT | Performed by: INTERNAL MEDICINE

## 2021-05-03 PROCEDURE — 86765 RUBEOLA ANTIBODY: CPT | Performed by: INTERNAL MEDICINE

## 2021-05-03 PROCEDURE — 83540 ASSAY OF IRON: CPT | Performed by: INTERNAL MEDICINE

## 2021-05-03 PROCEDURE — 82728 ASSAY OF FERRITIN: CPT | Performed by: INTERNAL MEDICINE

## 2021-05-03 PROCEDURE — 86140 C-REACTIVE PROTEIN: CPT | Performed by: INTERNAL MEDICINE

## 2021-05-03 PROCEDURE — 80053 COMPREHEN METABOLIC PANEL: CPT | Performed by: INTERNAL MEDICINE

## 2021-05-03 PROCEDURE — 90471 PNEUMOCOCCAL POLYSACCHARIDE VACCINE 23-VALENT =>2YO SQ IM: ICD-10-PCS | Mod: S$GLB,,, | Performed by: INTERNAL MEDICINE

## 2021-05-03 PROCEDURE — 86706 HEP B SURFACE ANTIBODY: CPT | Performed by: INTERNAL MEDICINE

## 2021-05-03 PROCEDURE — 86704 HEP B CORE ANTIBODY TOTAL: CPT | Performed by: INTERNAL MEDICINE

## 2021-05-03 PROCEDURE — 99999 PR PBB SHADOW E&M-EST. PATIENT-LVL II: ICD-10-PCS | Mod: PBBFAC,,,

## 2021-05-03 PROCEDURE — 90732 PPSV23 VACC 2 YRS+ SUBQ/IM: CPT | Mod: S$GLB,,, | Performed by: INTERNAL MEDICINE

## 2021-05-03 PROCEDURE — 86803 HEPATITIS C AB TEST: CPT | Performed by: INTERNAL MEDICINE

## 2021-05-03 PROCEDURE — 82306 VITAMIN D 25 HYDROXY: CPT | Performed by: INTERNAL MEDICINE

## 2021-05-03 PROCEDURE — 86787 VARICELLA-ZOSTER ANTIBODY: CPT | Performed by: INTERNAL MEDICINE

## 2021-05-03 PROCEDURE — 99215 OFFICE O/P EST HI 40 MIN: CPT | Mod: 25,S$GLB,, | Performed by: INTERNAL MEDICINE

## 2021-05-03 PROCEDURE — 86762 RUBELLA ANTIBODY: CPT | Performed by: INTERNAL MEDICINE

## 2021-05-03 PROCEDURE — 84439 ASSAY OF FREE THYROXINE: CPT | Performed by: INTERNAL MEDICINE

## 2021-05-03 PROCEDURE — 99999 PR PBB SHADOW E&M-EST. PATIENT-LVL II: CPT | Mod: PBBFAC,,,

## 2021-05-03 PROCEDURE — 86735 MUMPS ANTIBODY: CPT | Performed by: INTERNAL MEDICINE

## 2021-05-03 PROCEDURE — 84443 ASSAY THYROID STIM HORMONE: CPT | Performed by: INTERNAL MEDICINE

## 2021-05-03 PROCEDURE — 1126F AMNT PAIN NOTED NONE PRSNT: CPT | Mod: S$GLB,,, | Performed by: INTERNAL MEDICINE

## 2021-05-03 PROCEDURE — 86790 VIRUS ANTIBODY NOS: CPT | Performed by: INTERNAL MEDICINE

## 2021-05-03 PROCEDURE — 84702 CHORIONIC GONADOTROPIN TEST: CPT | Performed by: INTERNAL MEDICINE

## 2021-05-03 PROCEDURE — 82784 ASSAY IGA/IGD/IGG/IGM EACH: CPT | Performed by: INTERNAL MEDICINE

## 2021-05-03 PROCEDURE — 90732 PNEUMOCOCCAL POLYSACCHARIDE VACCINE 23-VALENT =>2YO SQ IM: ICD-10-PCS | Mod: S$GLB,,, | Performed by: INTERNAL MEDICINE

## 2021-05-03 PROCEDURE — 99215 PR OFFICE/OUTPT VISIT, EST, LEVL V, 40-54 MIN: ICD-10-PCS | Mod: 25,S$GLB,, | Performed by: INTERNAL MEDICINE

## 2021-05-03 PROCEDURE — 3008F BODY MASS INDEX DOCD: CPT | Mod: CPTII,S$GLB,, | Performed by: INTERNAL MEDICINE

## 2021-05-03 PROCEDURE — 86703 HIV-1/HIV-2 1 RESULT ANTBDY: CPT | Performed by: INTERNAL MEDICINE

## 2021-05-03 PROCEDURE — 87340 HEPATITIS B SURFACE AG IA: CPT | Performed by: INTERNAL MEDICINE

## 2021-05-03 RX ORDER — EPINEPHRINE 1 MG/ML
0.3 INJECTION, SOLUTION, CONCENTRATE INTRAVENOUS
OUTPATIENT
Start: 2021-05-03

## 2021-05-03 RX ORDER — DIPHENHYDRAMINE HYDROCHLORIDE 50 MG/ML
25 INJECTION INTRAMUSCULAR; INTRAVENOUS
OUTPATIENT
Start: 2021-05-03

## 2021-05-03 RX ORDER — HEPARIN 100 UNIT/ML
500 SYRINGE INTRAVENOUS
OUTPATIENT
Start: 2021-05-03

## 2021-05-03 RX ORDER — ACETAMINOPHEN 325 MG/1
650 TABLET ORAL
OUTPATIENT
Start: 2021-05-03

## 2021-05-03 RX ORDER — METHYLPREDNISOLONE SOD SUCC 125 MG
40 VIAL (EA) INJECTION
OUTPATIENT
Start: 2021-05-03

## 2021-05-03 RX ORDER — SODIUM CHLORIDE 0.9 % (FLUSH) 0.9 %
10 SYRINGE (ML) INJECTION
OUTPATIENT
Start: 2021-05-03

## 2021-05-04 LAB
25(OH)D3+25(OH)D2 SERPL-MCNC: 26 NG/ML (ref 30–96)
HBV CORE AB SERPL QL IA: NEGATIVE
HBV SURFACE AG SERPL QL IA: NEGATIVE
HCV AB SERPL QL IA: NEGATIVE
HEPATITIS A ANTIBODY, IGG: NEGATIVE
HIV 1+2 AB+HIV1 P24 AG SERPL QL IA: NEGATIVE
RUBV IGG SER-ACNC: 39.3 IU/ML
RUBV IGG SER-IMP: REACTIVE

## 2021-05-05 LAB
HBV SURFACE AB SER QL IA: NEGATIVE
HBV SURFACE AB SERPL IA-ACNC: <3 MIU/ML
MUMPS IGG INTERPRETATION: POSITIVE
MUMPS IGG SCREEN: 2.08 ISR (ref 0–0.9)
RUBEOLA IGG ANTIBODY: 2.8 ISR (ref 0–0.9)
RUBEOLA INTERPRETATION: POSITIVE
TTG IGA SER-ACNC: 51 UNITS
VARICELLA INTERPRETATION: POSITIVE
VARICELLA ZOSTER IGG: 1.86 ISR (ref 0–0.9)

## 2021-05-06 ENCOUNTER — TELEPHONE (OUTPATIENT)
Dept: GASTROENTEROLOGY | Facility: CLINIC | Age: 61
End: 2021-05-06

## 2021-05-06 DIAGNOSIS — R76.8 ELEVATED ANTI-TISSUE TRANSGLUTAMINASE (TTG) IGA LEVEL: ICD-10-CM

## 2021-05-06 DIAGNOSIS — K50.818 CROHN'S DISEASE OF BOTH SMALL AND LARGE INTESTINE WITH OTHER COMPLICATION: Primary | ICD-10-CM

## 2021-05-11 ENCOUNTER — PATIENT MESSAGE (OUTPATIENT)
Dept: GASTROENTEROLOGY | Facility: CLINIC | Age: 61
End: 2021-05-11

## 2021-05-20 ENCOUNTER — TELEPHONE (OUTPATIENT)
Dept: GASTROENTEROLOGY | Facility: CLINIC | Age: 61
End: 2021-05-20

## 2021-05-21 ENCOUNTER — TELEPHONE (OUTPATIENT)
Dept: GASTROENTEROLOGY | Facility: CLINIC | Age: 61
End: 2021-05-21

## 2021-05-28 LAB
ENDOMYSIUM IGA SER QL: NEGATIVE
IGA SERPL-MCNC: 658 MG/DL (ref 87–352)
TTG IGA SER-ACNC: 3 U/ML (ref 0–3)

## 2021-06-03 LAB
INFLIXIMAB AB SERPL-MCNC: <22 NG/ML
INFLIXIMAB SERPL-MCNC: 3.3 UG/ML

## 2021-06-08 ENCOUNTER — TELEPHONE (OUTPATIENT)
Dept: GASTROENTEROLOGY | Facility: CLINIC | Age: 61
End: 2021-06-08

## 2021-06-14 ENCOUNTER — TELEPHONE (OUTPATIENT)
Dept: GASTROENTEROLOGY | Facility: CLINIC | Age: 61
End: 2021-06-14

## 2021-06-21 ENCOUNTER — HOSPITAL ENCOUNTER (OUTPATIENT)
Dept: RADIOLOGY | Facility: HOSPITAL | Age: 61
Discharge: HOME | End: 2021-06-21
Attending: INTERNAL MEDICINE
Payer: COMMERCIAL

## 2021-06-21 DIAGNOSIS — K50.80 CROHN'S DISEASE OF BOTH SMALL AND LARGE INTESTINE WITHOUT COMPLICATION: ICD-10-CM

## 2021-06-21 LAB
CREAT SERPL-MCNC: 1.4 MG/DL (ref 0.5–1.4)
SAMPLE: NORMAL

## 2021-06-21 PROCEDURE — 72197 MRI PELVIS W/O & W/DYE: CPT | Mod: TC

## 2021-06-21 PROCEDURE — A9585 GADOBUTROL INJECTION: HCPCS | Performed by: INTERNAL MEDICINE

## 2021-06-21 PROCEDURE — 74183 MRI ABD W/O CNTR FLWD CNTR: CPT | Mod: 26,,, | Performed by: RADIOLOGY

## 2021-06-21 PROCEDURE — 74183 MRI ENTEROGRAPHY: ICD-10-PCS | Mod: 26,,, | Performed by: RADIOLOGY

## 2021-06-21 PROCEDURE — 72197 MRI ENTEROGRAPHY: ICD-10-PCS | Mod: 26,,, | Performed by: RADIOLOGY

## 2021-06-21 PROCEDURE — 72197 MRI PELVIS W/O & W/DYE: CPT | Mod: 26,,, | Performed by: RADIOLOGY

## 2021-06-21 PROCEDURE — 25500020 PHARM REV CODE 255: Performed by: INTERNAL MEDICINE

## 2021-06-21 RX ORDER — GADOBUTROL 604.72 MG/ML
8 INJECTION INTRAVENOUS
Status: COMPLETED | OUTPATIENT
Start: 2021-06-21 | End: 2021-06-21

## 2021-06-21 RX ADMIN — GADOBUTROL 8 ML: 604.72 INJECTION INTRAVENOUS at 01:06

## 2021-07-13 ENCOUNTER — OFFICE VISIT (OUTPATIENT)
Dept: GASTROENTEROLOGY | Facility: CLINIC | Age: 61
End: 2021-07-13
Payer: COMMERCIAL

## 2021-07-13 VITALS
TEMPERATURE: 98 F | DIASTOLIC BLOOD PRESSURE: 84 MMHG | WEIGHT: 163.13 LBS | OXYGEN SATURATION: 97 % | SYSTOLIC BLOOD PRESSURE: 169 MMHG | HEART RATE: 87 BPM | HEIGHT: 66 IN | BODY MASS INDEX: 26.22 KG/M2 | RESPIRATION RATE: 14 BRPM

## 2021-07-13 DIAGNOSIS — K50.818 CROHN'S DISEASE OF BOTH SMALL AND LARGE INTESTINE WITH OTHER COMPLICATION: Primary | ICD-10-CM

## 2021-07-13 DIAGNOSIS — K62.4 ANAL STRICTURE: ICD-10-CM

## 2021-07-13 DIAGNOSIS — K22.2 ESOPHAGEAL STRICTURE: ICD-10-CM

## 2021-07-13 DIAGNOSIS — D84.9 IMMUNOSUPPRESSED STATUS: ICD-10-CM

## 2021-07-13 PROCEDURE — 3008F BODY MASS INDEX DOCD: CPT | Mod: CPTII,S$GLB,, | Performed by: INTERNAL MEDICINE

## 2021-07-13 PROCEDURE — 99215 PR OFFICE/OUTPT VISIT, EST, LEVL V, 40-54 MIN: ICD-10-PCS | Mod: S$GLB,,, | Performed by: INTERNAL MEDICINE

## 2021-07-13 PROCEDURE — 99215 OFFICE O/P EST HI 40 MIN: CPT | Mod: S$GLB,,, | Performed by: INTERNAL MEDICINE

## 2021-07-13 PROCEDURE — 1126F AMNT PAIN NOTED NONE PRSNT: CPT | Mod: S$GLB,,, | Performed by: INTERNAL MEDICINE

## 2021-07-13 PROCEDURE — 3008F PR BODY MASS INDEX (BMI) DOCUMENTED: ICD-10-PCS | Mod: CPTII,S$GLB,, | Performed by: INTERNAL MEDICINE

## 2021-07-13 PROCEDURE — 1126F PR PAIN SEVERITY QUANTIFIED, NO PAIN PRESENT: ICD-10-PCS | Mod: S$GLB,,, | Performed by: INTERNAL MEDICINE

## 2021-07-13 RX ORDER — FAMOTIDINE 10 MG/1
10 TABLET ORAL 2 TIMES DAILY
COMMUNITY
End: 2022-02-15

## 2021-07-13 RX ORDER — LOPERAMIDE HYDROCHLORIDE 2 MG/1
2 CAPSULE ORAL 4 TIMES DAILY PRN
COMMUNITY
End: 2022-02-15

## 2021-08-17 ENCOUNTER — TELEPHONE (OUTPATIENT)
Dept: GASTROENTEROLOGY | Facility: CLINIC | Age: 61
End: 2021-08-17

## 2021-08-24 ENCOUNTER — TELEPHONE (OUTPATIENT)
Dept: GASTROENTEROLOGY | Facility: CLINIC | Age: 61
End: 2021-08-24

## 2021-10-01 ENCOUNTER — TELEPHONE (OUTPATIENT)
Dept: GASTROENTEROLOGY | Facility: CLINIC | Age: 61
End: 2021-10-01

## 2021-10-13 ENCOUNTER — TELEPHONE (OUTPATIENT)
Dept: GASTROENTEROLOGY | Facility: CLINIC | Age: 61
End: 2021-10-13

## 2021-10-15 ENCOUNTER — TELEPHONE (OUTPATIENT)
Dept: GASTROENTEROLOGY | Facility: CLINIC | Age: 61
End: 2021-10-15
Payer: COMMERCIAL

## 2021-10-28 ENCOUNTER — TELEPHONE (OUTPATIENT)
Dept: GASTROENTEROLOGY | Facility: CLINIC | Age: 61
End: 2021-10-28
Payer: COMMERCIAL

## 2021-10-28 DIAGNOSIS — D84.9 IMMUNOSUPPRESSED STATUS: ICD-10-CM

## 2021-10-28 DIAGNOSIS — K50.818 CROHN'S DISEASE OF BOTH SMALL AND LARGE INTESTINE WITH OTHER COMPLICATION: Primary | ICD-10-CM

## 2021-11-15 ENCOUNTER — TELEPHONE (OUTPATIENT)
Dept: GASTROENTEROLOGY | Facility: CLINIC | Age: 61
End: 2021-11-15
Payer: COMMERCIAL

## 2021-12-30 ENCOUNTER — TELEPHONE (OUTPATIENT)
Dept: GASTROENTEROLOGY | Facility: CLINIC | Age: 61
End: 2021-12-30
Payer: COMMERCIAL

## 2022-01-05 DIAGNOSIS — Z12.11 SPECIAL SCREENING FOR MALIGNANT NEOPLASMS, COLON: Primary | ICD-10-CM

## 2022-01-05 RX ORDER — SODIUM, POTASSIUM,MAG SULFATES 17.5-3.13G
1 SOLUTION, RECONSTITUTED, ORAL ORAL DAILY
Qty: 1 KIT | Refills: 0 | Status: SHIPPED | OUTPATIENT
Start: 2022-01-05 | End: 2022-01-07

## 2022-01-07 LAB
INFLIXIMAB AB SERPL-MCNC: <22 NG/ML
INFLIXIMAB SERPL-MCNC: 15 UG/ML

## 2022-01-10 ENCOUNTER — TELEPHONE (OUTPATIENT)
Dept: GASTROENTEROLOGY | Facility: CLINIC | Age: 62
End: 2022-01-10
Payer: COMMERCIAL

## 2022-01-10 NOTE — TELEPHONE ENCOUNTER
Per Dr Barbour Confirm dates of last 3 remicade infusions and be sure 1/3/22 was trough level prior to her infusion.   IF so then let her know that levels have improved significantly and Dr. Barbour is pleased with the results. We will discuss further at her visit on 2/15/22

## 2022-01-10 NOTE — TELEPHONE ENCOUNTER
Call and spoke to pt   -discuss labs results below with pt   -had labs done before infusion on 1/3/2022  -Last 3 inflectra infusion were 1/3/22, 12/6/21 11/8/21   -could not get scope schedule until 2/25/22 with Dr Polo   -pt expressed understanding   -all questions and concerns were answered   -should we reschedule f/u appt on 2/15/22 after scope

## 2022-02-10 ENCOUNTER — TELEPHONE (OUTPATIENT)
Dept: GASTROENTEROLOGY | Facility: CLINIC | Age: 62
End: 2022-02-10
Payer: COMMERCIAL

## 2022-02-10 NOTE — TELEPHONE ENCOUNTER
"Leta called patient yesterday to confirm for Tuesday 2/15/2022   -Pt stated she unable to keep appt on Tuesday   -she was offered VV appt for 2/10 at 10:00 with Dr Barbour and pt accepted   -reviewed pre-check process for VV    -pt expressed understanding     Today at 8:58am Leta called pt to begin pre-check process   -pt asked "why did you call so early?"   -Leta asked if pt had a few mins to answer questions prior to visit if not we could call back and pt agreed   -pt became upset when she asked about covid booster and flu shots dates stating, "I answered the questionnaire why do I need to answer more questions? Is Dr Barbour is going to call her at 10:00? I have time set aside for her to call me."   -Leta advise pt her visit would be done through her phone. To log on [facility] at 9:45 and hit the begin visit button.   -pt expressed concern while loudly stating, "I thought this was going to be a phone call. I thought that is what a virtual visit was."    - Advised Leta to put the patient on hold and I took over the phone call  - Pt loudly expressed concerns regarding performing a VV stating, "I do not conducted any business over the phone and I do not have my computer"  -Informed pt that [employer] platform is a secure network   -Offered to reschedule appt to in person on another day; pt refused stating "I will discuss with Dr Barbour and you did not give me all the information on doing a virtual visit."  -line disconnect  - Dr. Barbour made aware  "

## 2022-02-15 ENCOUNTER — OFFICE VISIT (OUTPATIENT)
Dept: GASTROENTEROLOGY | Facility: CLINIC | Age: 62
End: 2022-02-15
Payer: COMMERCIAL

## 2022-02-15 DIAGNOSIS — K50.818 CROHN'S DISEASE OF BOTH SMALL AND LARGE INTESTINE WITH OTHER COMPLICATION: Primary | ICD-10-CM

## 2022-02-15 PROCEDURE — 99215 OFFICE O/P EST HI 40 MIN: CPT | Mod: 95,,, | Performed by: INTERNAL MEDICINE

## 2022-02-15 PROCEDURE — 99215 PR OFFICE/OUTPT VISIT, EST, LEVL V, 40-54 MIN: ICD-10-PCS | Mod: 95,,, | Performed by: INTERNAL MEDICINE

## 2022-02-15 RX ORDER — FLUTICASONE PROPIONATE 50 MCG
1 SPRAY, SUSPENSION (ML) NASAL DAILY
COMMUNITY

## 2022-02-15 RX ORDER — LOSARTAN POTASSIUM 50 MG/1
50 TABLET ORAL DAILY
COMMUNITY
Start: 2022-01-24 | End: 2024-03-27

## 2022-02-15 NOTE — PROGRESS NOTES
[facility]             Inflammatory Bowel Disease   Follow-up  Note              TODAY'S VISIT DATE:  2/15/2022    Chief Complaint:   Chief Complaint   Patient presents with    Crohn's Disease     PCP: Leta Dumont    Previous History:  Aline Fuller is a 61 y.o.  female with Crohn's disease S/P surgery with anal stricture and anastomotic/ileum stricture (h/o perianal and buttock fistulas with abscess with severe anal stricture dilation, S/P multiple surgeries including partial colectomy in 1973, 1979 and 1997),  history of breast cancer (dxed 4/2014, lumpectomy 5/2014, chemo/XRT and then tamoxifen), GERD with esophageal stricture.  She initially started with symptoms of abdominal pain, weight loss, and diarrhea in 1972 and had an ex lap in 1973 with 6 inches of colon removed that was non-diagnostic for CD.  She was not diagnosed until 1979 after her second ex lap with 6 inches of small bowel removed.  She was initially treated with several courses of prednisone.  Early in her diagnosis, she took sulfasalazine though it was discontinued due to a rash, dipentum though it was not effective long term, and imuran though it had to be discontinued due to bone marrow suppression.  Her symptoms progressed which resulted in an SBO that prompted her third ex lap in 1997 that resulted in 6 inches of small bowel being ressected.  She was in clinic remission and on no medications from 1997 to 2001 at which time she recalls having a fistula and and abscess that required I&D and ABX.  She had recurrence of fistula and abscess in 2004 or 2005.  She also took asacol, colazal, 6MP, and imuran that were all ineffective though possible anemia with 6MP/Imuran.  In 2008 she started humira with induction and maintenance but discontinued after 3 months because patient broke out with a rash though she also felt it was ineffective.   She established care with Dr. Polo in 2010 and had a colonoscopy  10/2010 which showed fistulas, anal stricture dilated, and active inflammation at the ileocolonic anastomosis that could not be traversed.  In 12/2010 she had perianal Crohn's disease that was treated with dilation of rectal stricture and seton placement.  She started remicade 2/2011.  MRI 4/2011 showed multiple perianal fistulas with seton removal in approximately 5/2011.  At some time in 2012, her remicade dose frequency was increased to 5 mg/kg to every 6 weeks.  She was diagnosed with stage 1 breast cancer 4/2014  that was treated with lumpectomy 5/2014 followed by chemo/XRT with tamoxifen. Since 2012 she continues on remicade 5 mg/kg every 6 weeks though it was held during chemotherapy from 6/2014-4/2015 and restarted with induction and maintenance of 5 mg/kg every 6 weeks.   Colonoscopy 10/2016 showed continued anal stricture with inflammation throughout the colon and neoterminal ileum could not be intubated.  In 2/2017 Garcia trough IFX levels/abs were undetectable with no ABs.  In spring 2017 remicade was optimized to 10 mg/kg every 6 weeks. CTE 2/27/17 showed active CD involving the descending and sigmoid colon and rectum.  Decreased caliber at the ileocolic anastomosis with possible mucosal hyperenhancement.  Note is made of focal decreased caliber within the rectum, possibly transient.  Multiple perianal fistulas without evidence for abscess; heterogeneous enhancement within the uterus.  Appearance is not certainly secondary to leiomyomas.  Given postmenopausal age group, recommend further evaluation with pelvic ultrasound to assess endometrium.   She was last seen in our clinic 8/2017 at which time we recommended yearly SB imaging with MRE and anal stricture dilation of constipation returns by Dr. Mcclure with EUA though o/w plans to dilate at time of colonoscopy yearly.  MRE 6/25/18 showed some suspected luminal narrowing to approximately 5 mm at level of the ileocolic anastomosis RIGHT lower quadrant  without evidence for upstream dilatation of bowel questionable minimal hyper enhancement without evidence for mesenteric edema. In 12/2019 she had EGD and colonoscopy with EGD showing diffuse esohageal candiasis and dilation of esophageal stricture though not fully dilated due to risk of perforation with bleeding at time of dilation initially. In 4/2019 she had EUA with Dr. Aparicio with anal biopsies showing no dysplasia.   In 12/2019 patient had colonoscopy which showed buttock fistula opening that is closed and scarring and hypopigmentation of the skin. Severe anal stenosis and large perianal skin tags. Stricture dilated by KARON. The remainder of the colon was normal though scope aborted in transverse colon due to significant fixed looping. After her scopes 12/2019 pt was upset b/c she did not recall plan of care outlined after procedures so I called and explained that with this tight anal stricture we feel surgery with colostomy was ideal given risk of cancer and obstruction though she did not wish to make a f/u appt with CRS at that time. Due to esophageal candidiasis and immunosuppression she was given a 3 week course of diflucan.  At that time she continued follow up with our recommendations and repeat EGD with Dr. Polo for esophageal stricture dilation after candidiasis treatment. Repeat EGD 1/29/19 showed a severe esophageal stricture of lower third of the esophagus with dilator passed through the scope and on reinsertion moderate mucosal disruption with normal stomach and duodenum. On 12/14/20 patient had dilation of esophageal stricture again up to 12 mm with normal stomach and duodenum. Colonoscopy 12/2020 showed perianal skin tags with severe anal stenosis, prior end to side ileocolonic anastomosis of the transverse colon which could not be traversed with normal colon. Biopsies of transverse/descending/sigmoid normal, biopsies of rectum with focal bifid crypt architectural distortion. Per patient Dr. Aparicio  was present and dilated stricture during the scope with Dr. Polo. She continues on remicade 10 mg/kg every 6 weeks (([id number]--reinitiated 2016 with induction and then every 6 weeks maintenance, 10 mg/kg q 6 weeks spring 2017) and she has 1-3 BMs/day, loose to soft, no blood and has rare incontinence every few weeks. Once every few weeks she has a nocturnal BM. Often her bowel movements depends on what she ate and what time she ate. She has some dysphagia with dry solid (chicken breast, fish) and if she eats small bites and chews things slowly then overall does okay. Every few weeks has no bowel movements with bloating and difficulty evacuating which lasts for 2-3 days and occurs every 2-3 days and then once constipation resolves she then has diarrhea.  She drinks a lot of water and occasionally a fleets enemas. MRE 6/2021 around the area of anastomosis there is tethering and fibrotic bands with multiple tortuous SB loops and luminal narrowing at the anastomosis without significant upstream dilatation, cholecystectomy with mild CBD prominence    Interval History:  - current IBD meds: inflectra 10 mg/kg q 4 weeks (10 mg/kg q 4 weeks from q 6 weeks started 11/2022, LD 1/31, ND 2/28)  - 1-2 soft BMs/d, no blood, twice a week has nocturnal BM- some constipation (on miralax qod)- straining, rectal pain, lower abd pain  - weight loss- intentional, lower sugar diet---lost about 10 pounds since 11/2021- and now stable  - 1/3/22 trough IFX level 15/Abs neg  - EGD/colonoscopy recommended 12/2021 but pt now scheduled 3/17/22  - trouble swallowing- if eating fast and not paying attention has had dysphagia with food stuck- occurred last 7/4, eating green smoothies  - itching with infusions but no rash  - heartburn- no longer needing pepcid prn- not having heartburn in some time due chewing food and eating slowly  - sinus symptoms- taking claritin, benadryl prn  - NSAID use: No  - Narcotic use: No  - Alternative/complementary  meds for IBD: No    Previous Surgeries:  1973 large bowel resection  1979 further colonic resection  12/17/2010: EUA, dilation of rectal stricture and placement of setons Xs 4    Pertinent Endoscopy/Imaging:  10/7/2010 Colonoscopy: perianal fistulas and skin tags; anal stricture which was dilated with the index finger; evidence of a prior end-to-side ileo-colonic anastomosis in the ascending colon--this was characterized by congestion, edema, erosion, erythema, and friable mucosa (path-TI--the changes appear mostly acute with erosion and increased neutrophils within the lamina propria, focally producing cryptitis; granulomata are not identified); the exam of the entire colon was otherwise without abnormality  4/18/2011 MRI abdomen & pelvis:  MULTIPLE COMPLEX-APPEARING PERIANAL FISTULAS WHICH CONNECT WITH THE GLUTEAL SURFACE BILATERALLY, AND ONE OF WHICH APPEARS TO CROSS MIDLINE CONNECTING THE RIGHT AND LEFT SIDE AT THE LEVEL OF THE ISCHIAL TUBEROSITY.  NO EVIDENCE OF DEFINITE FISTULOUS COMMUNICATION TO THE VAGINA OR BLADDER;    CIRCUMFERENTIAL AND ASYMMETRIC LEFT RECTAL THICKENING WITHOUT DISCRETE MASS IDENTIFIED.  THIS IS NONSPECIFIC AND COULD BE RELATED TO PRIOR SURGERY OR INFLAMMATORY OR INFECTIOUS PROCESS; FIBROID UTERUS  8/20/2012 Colonoscopy: skin tags; benign-appearing, intrinsic severe stenosis found at the anus that was traversed after dilation with the index finger; normal mucosa in the rectum, sigmoid, proximal & distal transverse colon, proximal & distal ascending colon (path-colonic mucosa with focal architectural distortion; no active inflammation or dysplasia identified)  5/23/2013 EGD: benign-appearing, intrinsic moderate stenosis was found at the GE junction; minimal mucosal tear with passage of the endoscope; entire examined stomach and duodenum was normal  4/25/2014 US: Normal  4/7/2015 Colonoscopy: skin tags; anal stricture dilated with index finger up to PIP; inflammation characterized by deep  ulcerations was found as patches surrounded by normal mucosa in the ileocolonic anastomosis and rectum--moderate in severity and worsened when compared to previous findings (path-cecum--colonic mucosa with acute inflammation, ulceration and inflamed granulation tissue, no granulomas or dysplasia); the sigmoid, descending, splenic flexure, and ascending colons were spared  6/27/2016 CXR: normal  10/24/2016 EGD: severe benign-appearing, intrinsic stenosis was found at 35 cm from the incisors-dilation with a 10-11-12 mm pyloric balloon dilator was performed--reexamined and showed moderate improvement in luminal narrowing; entire examined stomach and duodenum was normal (no biopsies taken)  10/24/2016 Colonoscopy: anal stricture; inflammation characterized by serpentine ulcerations was found as medium-sized patches surrounded by normal mucosa in the rectum, as medium-sized patches surrounded by normal mucosa in the recto-sigmoid colon, as medium-sized patches surrounded by normal mucosa in the descending colon, as medium-sized patches surrounded by normal mucosa in the transverse colon, as medium-sized patches  surrounded by normal mucosa in the ascending colon and as  medium-sized patches surrounded by normal mucosa in the terminal  ileum. No sites were spared. This was moderate in severity, and when compared to previous examinations, the findings are worsened. Neoterminal ileum could not be intubated. (no biopsies taken)  2/27/17 CTE: Active CD involving the descending and sigmoid colon and rectum.  Decreased caliber at the ileocolic anastomosis with possible mucosal hyperenhancement.  Note is made of focal decreased caliber within the rectum, possibly transient.  Multiple perianal fistulas without evidence for abscess; heterogeneous enhancement within the uterus.  Appearance is not certainly secondary to leiomyomas.  Given postmenopausal age group, recommend further evaluation with pelvic ultrasound to assess  endometrium.  6/25/2018 MRE:  Suspected luminal narrowing to approximately 5 mm at level of the ileocolic anastomosis RIGHT lower quadrant without evidence for upstream dilatation of bowel questionable minimal hyper enhancement without evidence for mesenteric edema.  12/2019 EGD:  esophageal candiasis and dilation of esophageal stricture though not fully dilated due to risk of perforation with bleeding at time of dilation initially  12/2019 colonoscopy: buttock fistula opening that is closed and scarring and hypopigmentation of the skin. Severe anal stenosis and large perianal skin tags. Stricture dilated by KARON. The remainder of the colon was normal though scope aborted in transverse colon due to significant fixed looping.   1/29/19 EGD:  severe esophageal stricture of lower third of the esophagus with dilator passed through the scope and on reinsertion moderate mucosal disruption with normal stomach and duodenum. 12/14/20 EGD:  dilation of esophageal stricture again up to 12 mm with normal stomach and duodenum  12/2020 colonoscopy:  perianal skin tags with severe anal stenosis, prior end to side ileocolonic anastomosis of the transverse colon which could not be traversed with normal colon. Biopsies of transverse/descending/sigmoid normal, biopsies of rectum with focal bifid crypt architectural distortion.   6/21/21 MRE: Prior partial colectomy and terminal ileal resection with ileocolonic anastomosis in the RUQ.  Around the area of anastomosis there is tethering and fibrotic bands with multiple tortuous small-bowel loops. There is luminal narrowing at the anastomosis without significant upstream dilatation.  Cholecystectomy with mild prominence of the common bile duct measuring up to 7 mm.    Pertinent Labs:  6/2016 TB quantiferon neg  1/30/17 CRP 3.2  1/30/17 TPMT normal  1/30/17 vit D 25  1/30/17 hepatitis B testing negative   1/30/17 VZV IgG negative  2/2017 Garcia IFX level <1.0, Ab neg    Prior IBD  Therapies:  Prednisone  Sulfasalazine- rash  Dipentum- ineffective  6MP- bone marrow suppression  Flagyl/Cipro- effective   Asacol- ineffective  Colazal- ineffective  Imuran  Humira-rash- 2008 or 2009    Vaccinations:  Lab Results   Component Value Date    HEPBSAB Negative 01/30/2017     Lab Results   Component Value Date    HEPBSURFABQU Negative 05/03/2021    HEPBSURFABQU <3 05/03/2021     Lab Results   Component Value Date    HEPAIGG Negative 05/03/2021     Lab Results   Component Value Date    VARICELLAZOS 1.86 (H) 05/03/2021    VARICELLAINT Positive (A) 05/03/2021     Immunization History   Administered Date(s) Administered    COVID-19, MRNA, LN-S, PF (Pfizer) (Purple Cap) 03/01/2021, 03/22/2021    Hepatitis A / Hepatitis B 01/20/2011, 02/18/2011, 08/12/2011    Influenza - High Dose - PF (65 years and older) 10/28/2019    Influenza - Quadrivalent - PF *Preferred* (6 months and older) 09/18/2018, 11/04/2020    Pneumococcal Conjugate - 13 Valent 08/26/2015, 12/12/2016    Pneumococcal Polysaccharide - 23 Valent 05/03/2021    Tdap 04/27/2009     Tetanus (TdaP): recommended   HPV:    NA  Meningococcal: NA  Hepatitis B: recommended  Hepatitis A:  Recommended   MMR (live vaccine):  immune  Shingrix: recommended     Review of Systems   Constitutional: Negative for chills, fever and weight loss.   HENT:        No oral ulcers, dysphagia, oral thrush   Eyes: Negative for blurred vision, pain and redness.   Respiratory: Negative for cough and shortness of breath.    Cardiovascular: Negative for chest pain.   Gastrointestinal: Negative for abdominal pain, heartburn, nausea and vomiting.   Genitourinary: Negative for dysuria and hematuria.   Musculoskeletal: Negative for back pain and joint pain.   Skin: Negative for rash.   Psychiatric/Behavioral: Negative for depression. The patient is not nervous/anxious and does not have insomnia.       All Medical History/Surgical History/Family History/Social History/Allergies  have been reviewed and updated in EMR    Review of patient's allergies indicates:   Allergen Reactions    Amlodipine Rash    Sulfa (sulfonamide antibiotics) Hives     Other reaction(s): Rash     No outpatient medications have been marked as taking for the 2/15/22 encounter (Office Visit) with Diamond Barbour MD.     Vital Signs:  There were no vitals taken for this visit.     Physical Exam     Labs:   Lab Results   Component Value Date    CRP 3.5 05/03/2021     Lab Results   Component Value Date    HEPBSAG Negative 05/03/2021    HEPBCAB Negative 05/03/2021     Lab Results   Component Value Date    TBGOLDPLUS Negative 05/03/2021     Lab Results   Component Value Date    YUJSKHRC17EG 26 (L) 05/03/2021    XFKPBGUT45 >2000 (H) 05/03/2021     Lab Results   Component Value Date    WBC 8.34 05/03/2021    HGB 10.3 (L) 05/03/2021    HCT 33.5 (L) 05/03/2021    MCV 93 05/03/2021     05/03/2021     Lab Results   Component Value Date    CREATININE 1.2 05/03/2021    ALBUMIN 3.6 05/03/2021    BILITOT 0.4 05/03/2021    ALKPHOS 100 05/03/2021    AST 26 05/03/2021    ALT 19 05/03/2021     Assessment/Plan:  Aline Fuller is a 61 y.o. female with Crohn's disease S/P surgery with anal stricture and anastomotic/ileum stricture (h/o perianal and buttock fistulas with abscess with severe anal stricture dilation, S/P multiple surgeries including partial colectomy in 1973, 1979 and 1997),  history of breast cancer (dxed 4/2014, lumpectomy 5/2014, chemo/XRT and then tamoxifen), GERD with esophageal stricture who has ongoing intermittent dysphagia and constipation likely related to esophageal stricture an anal stricture.  She is taking MiraLax more regularly and chews her food well with a low residue diet and eating more smoothies.  She is scheduled for an EGD/colonoscopy with Dr. Polo on 3/17/2022 at which time dilations will be performed appropriately.  I will get an MRE at the time of her next follow-up visit in May 2022.   Most recently we have optimized her infliximab and she is currently on Inflectra 10 mg/kg every 4 weeks with an improvement of her drug levels from 3-15.    # Crohn's disease S/P surgery (ileum, colon, h/o perianal and buttock fistulas with abscess with severe anal stricture dilation, S/P multiple surgeries including partial colectomy in 1973, 1979 and 1997)  - note:  history of breast cancer (dxed 4/2014, lumpectomy 5/2014, chemo/XRT and then tamoxifen)  - anal stricture with constipation- drink lots of water, miralax, colonoscopy with dilation 3/17/22  - Anastomotic/SB stricture and tethering- no upstream dilation, unable to evaluate mucosa on scope, no obvious inflammation on MRE, warned pt of SBO symptoms in past, low residue diet/chew food well- repeat MRE 5/2022 same day as next visit   - continue inflectra 10 mg/kg q 4 weeks  - colonoscopy 3/17/22  - vitamin B12 (5/2021 >2000)- continue vit B12 1000 mcg IM q 4 weeks- prescribed by hematology  - drug monitoring labs: CBC/CMP q 6 mos (5/2022), TPMT (1/2017 TPMT 32.6), TB quantiferon (5/2022), Hep B testing (5/2022)  - TDM: trough remicade levels/abs 7/2022    # Distal esophageal stricture  - etiology unclear- GERD?  - last dilated 12/2020 with improved symptoms  - symptomatic  - EGD 3/17/22- will likely need dilation    # IBD specific health maintenance:  CRC risk- sx 1972, >1/3 colon, surveillance colonoscopy q 1-2 years- 12/2021  Skin exam yearly- normal 11/2020, next was due 11/2021 and pt to schedule with local dermatologist, Dr. Shapley  Osteopenia- postmenopausal, unable to swallow calcium supplements  Pap smear yearly- normal 11/2021, next due 11/2022  Vitamin D (5/2021 26)- continue vit D 3 takes two---5000 IU/d=10,000 IU/d  Vaccines: no live vaccines, will get hep A, B, tetanus and shingrix- get at local pharmacy    Follow up: 3 mos    The patient location is: Home  The chief complaint leading to consultation is: Crohn's disease    Visit type:  audiovisual    Face to Face time with patient: 20 minutes  40 minutes of total time spent on the encounter, which includes face to face time and non-face to face time preparing to see the patient (eg, review of tests), Obtaining and/or reviewing separately obtained history, Documenting clinical information in the electronic or other health record, Independently interpreting results (not separately reported) and communicating results to the patient/family/caregiver, or Care coordination (not separately reported).     Each patient to whom he or she provides medical services by telemedicine is:  (1) informed of the relationship between the physician and patient and the respective role of any other health care provider with respect to management of the patient; and (2) notified that he or she may decline to receive medical services by telemedicine and may withdraw from such care at any time.    Diamond Barbour MD  Department of Gastroenterology  Medical Director, Inflammatory Bowel Disease

## 2022-02-15 NOTE — PATIENT INSTRUCTIONS
- proceed with scopes as scheduled 3/17/22  - get your hepatitis A vaccine (havrix) 2 doses 6 mos apart  - get your shingrix vaccines- first dose and 2nd dose 2-6 mos after  - get hepatitis B vaccine (heplisav) 2 doses 1 month apart   - continue miralax 1 capful daily  - continue inflectra q 4 weeks  - MRE- same day as f/u visit

## 2022-02-15 NOTE — H&P (VIEW-ONLY)
[facility]             Inflammatory Bowel Disease   Follow-up  Note              TODAY'S VISIT DATE:  2/15/2022    Chief Complaint:   Chief Complaint   Patient presents with    Crohn's Disease     PCP: Leta Dumont    Previous History:  Aline Fuller is a 61 y.o.  female with Crohn's disease S/P surgery with anal stricture and anastomotic/ileum stricture (h/o perianal and buttock fistulas with abscess with severe anal stricture dilation, S/P multiple surgeries including partial colectomy in 1973, 1979 and 1997),  history of breast cancer (dxed 4/2014, lumpectomy 5/2014, chemo/XRT and then tamoxifen), GERD with esophageal stricture.  She initially started with symptoms of abdominal pain, weight loss, and diarrhea in 1972 and had an ex lap in 1973 with 6 inches of colon removed that was non-diagnostic for CD.  She was not diagnosed until 1979 after her second ex lap with 6 inches of small bowel removed.  She was initially treated with several courses of prednisone.  Early in her diagnosis, she took sulfasalazine though it was discontinued due to a rash, dipentum though it was not effective long term, and imuran though it had to be discontinued due to bone marrow suppression.  Her symptoms progressed which resulted in an SBO that prompted her third ex lap in 1997 that resulted in 6 inches of small bowel being ressected.  She was in clinic remission and on no medications from 1997 to 2001 at which time she recalls having a fistula and and abscess that required I&D and ABX.  She had recurrence of fistula and abscess in 2004 or 2005.  She also took asacol, colazal, 6MP, and imuran that were all ineffective though possible anemia with 6MP/Imuran.  In 2008 she started humira with induction and maintenance but discontinued after 3 months because patient broke out with a rash though she also felt it was ineffective.   She established care with Dr. Polo in 2010 and had a colonoscopy  10/2010 which showed fistulas, anal stricture dilated, and active inflammation at the ileocolonic anastomosis that could not be traversed.  In 12/2010 she had perianal Crohn's disease that was treated with dilation of rectal stricture and seton placement.  She started remicade 2/2011.  MRI 4/2011 showed multiple perianal fistulas with seton removal in approximately 5/2011.  At some time in 2012, her remicade dose frequency was increased to 5 mg/kg to every 6 weeks.  She was diagnosed with stage 1 breast cancer 4/2014  that was treated with lumpectomy 5/2014 followed by chemo/XRT with tamoxifen. Since 2012 she continues on remicade 5 mg/kg every 6 weeks though it was held during chemotherapy from 6/2014-4/2015 and restarted with induction and maintenance of 5 mg/kg every 6 weeks.   Colonoscopy 10/2016 showed continued anal stricture with inflammation throughout the colon and neoterminal ileum could not be intubated.  In 2/2017 Garcia trough IFX levels/abs were undetectable with no ABs.  In spring 2017 remicade was optimized to 10 mg/kg every 6 weeks. CTE 2/27/17 showed active CD involving the descending and sigmoid colon and rectum.  Decreased caliber at the ileocolic anastomosis with possible mucosal hyperenhancement.  Note is made of focal decreased caliber within the rectum, possibly transient.  Multiple perianal fistulas without evidence for abscess; heterogeneous enhancement within the uterus.  Appearance is not certainly secondary to leiomyomas.  Given postmenopausal age group, recommend further evaluation with pelvic ultrasound to assess endometrium.   She was last seen in our clinic 8/2017 at which time we recommended yearly SB imaging with MRE and anal stricture dilation of constipation returns by Dr. Mcclure with EUA though o/w plans to dilate at time of colonoscopy yearly.  MRE 6/25/18 showed some suspected luminal narrowing to approximately 5 mm at level of the ileocolic anastomosis RIGHT lower quadrant  without evidence for upstream dilatation of bowel questionable minimal hyper enhancement without evidence for mesenteric edema. In 12/2019 she had EGD and colonoscopy with EGD showing diffuse esohageal candiasis and dilation of esophageal stricture though not fully dilated due to risk of perforation with bleeding at time of dilation initially. In 4/2019 she had EUA with Dr. Aparicio with anal biopsies showing no dysplasia.   In 12/2019 patient had colonoscopy which showed buttock fistula opening that is closed and scarring and hypopigmentation of the skin. Severe anal stenosis and large perianal skin tags. Stricture dilated by KARON. The remainder of the colon was normal though scope aborted in transverse colon due to significant fixed looping. After her scopes 12/2019 pt was upset b/c she did not recall plan of care outlined after procedures so I called and explained that with this tight anal stricture we feel surgery with colostomy was ideal given risk of cancer and obstruction though she did not wish to make a f/u appt with CRS at that time. Due to esophageal candidiasis and immunosuppression she was given a 3 week course of diflucan.  At that time she continued follow up with our recommendations and repeat EGD with Dr. Polo for esophageal stricture dilation after candidiasis treatment. Repeat EGD 1/29/19 showed a severe esophageal stricture of lower third of the esophagus with dilator passed through the scope and on reinsertion moderate mucosal disruption with normal stomach and duodenum. On 12/14/20 patient had dilation of esophageal stricture again up to 12 mm with normal stomach and duodenum. Colonoscopy 12/2020 showed perianal skin tags with severe anal stenosis, prior end to side ileocolonic anastomosis of the transverse colon which could not be traversed with normal colon. Biopsies of transverse/descending/sigmoid normal, biopsies of rectum with focal bifid crypt architectural distortion. Per patient Dr. Aparicio  was present and dilated stricture during the scope with Dr. Polo. She continues on remicade 10 mg/kg every 6 weeks (([id number]--reinitiated 2016 with induction and then every 6 weeks maintenance, 10 mg/kg q 6 weeks spring 2017) and she has 1-3 BMs/day, loose to soft, no blood and has rare incontinence every few weeks. Once every few weeks she has a nocturnal BM. Often her bowel movements depends on what she ate and what time she ate. She has some dysphagia with dry solid (chicken breast, fish) and if she eats small bites and chews things slowly then overall does okay. Every few weeks has no bowel movements with bloating and difficulty evacuating which lasts for 2-3 days and occurs every 2-3 days and then once constipation resolves she then has diarrhea.  She drinks a lot of water and occasionally a fleets enemas. MRE 6/2021 around the area of anastomosis there is tethering and fibrotic bands with multiple tortuous SB loops and luminal narrowing at the anastomosis without significant upstream dilatation, cholecystectomy with mild CBD prominence    Interval History:  - current IBD meds: inflectra 10 mg/kg q 4 weeks (10 mg/kg q 4 weeks from q 6 weeks started 11/2022, LD 1/31, ND 2/28)  - 1-2 soft BMs/d, no blood, twice a week has nocturnal BM- some constipation (on miralax qod)- straining, rectal pain, lower abd pain  - weight loss- intentional, lower sugar diet---lost about 10 pounds since 11/2021- and now stable  - 1/3/22 trough IFX level 15/Abs neg  - EGD/colonoscopy recommended 12/2021 but pt now scheduled 3/17/22  - trouble swallowing- if eating fast and not paying attention has had dysphagia with food stuck- occurred last 7/4, eating green smoothies  - itching with infusions but no rash  - heartburn- no longer needing pepcid prn- not having heartburn in some time due chewing food and eating slowly  - sinus symptoms- taking claritin, benadryl prn  - NSAID use: No  - Narcotic use: No  - Alternative/complementary  meds for IBD: No    Previous Surgeries:  1973 large bowel resection  1979 further colonic resection  12/17/2010: EUA, dilation of rectal stricture and placement of setons Xs 4    Pertinent Endoscopy/Imaging:  10/7/2010 Colonoscopy: perianal fistulas and skin tags; anal stricture which was dilated with the index finger; evidence of a prior end-to-side ileo-colonic anastomosis in the ascending colon--this was characterized by congestion, edema, erosion, erythema, and friable mucosa (path-TI--the changes appear mostly acute with erosion and increased neutrophils within the lamina propria, focally producing cryptitis; granulomata are not identified); the exam of the entire colon was otherwise without abnormality  4/18/2011 MRI abdomen & pelvis:  MULTIPLE COMPLEX-APPEARING PERIANAL FISTULAS WHICH CONNECT WITH THE GLUTEAL SURFACE BILATERALLY, AND ONE OF WHICH APPEARS TO CROSS MIDLINE CONNECTING THE RIGHT AND LEFT SIDE AT THE LEVEL OF THE ISCHIAL TUBEROSITY.  NO EVIDENCE OF DEFINITE FISTULOUS COMMUNICATION TO THE VAGINA OR BLADDER;    CIRCUMFERENTIAL AND ASYMMETRIC LEFT RECTAL THICKENING WITHOUT DISCRETE MASS IDENTIFIED.  THIS IS NONSPECIFIC AND COULD BE RELATED TO PRIOR SURGERY OR INFLAMMATORY OR INFECTIOUS PROCESS; FIBROID UTERUS  8/20/2012 Colonoscopy: skin tags; benign-appearing, intrinsic severe stenosis found at the anus that was traversed after dilation with the index finger; normal mucosa in the rectum, sigmoid, proximal & distal transverse colon, proximal & distal ascending colon (path-colonic mucosa with focal architectural distortion; no active inflammation or dysplasia identified)  5/23/2013 EGD: benign-appearing, intrinsic moderate stenosis was found at the GE junction; minimal mucosal tear with passage of the endoscope; entire examined stomach and duodenum was normal  4/25/2014 US: Normal  4/7/2015 Colonoscopy: skin tags; anal stricture dilated with index finger up to PIP; inflammation characterized by deep  ulcerations was found as patches surrounded by normal mucosa in the ileocolonic anastomosis and rectum--moderate in severity and worsened when compared to previous findings (path-cecum--colonic mucosa with acute inflammation, ulceration and inflamed granulation tissue, no granulomas or dysplasia); the sigmoid, descending, splenic flexure, and ascending colons were spared  6/27/2016 CXR: normal  10/24/2016 EGD: severe benign-appearing, intrinsic stenosis was found at 35 cm from the incisors-dilation with a 10-11-12 mm pyloric balloon dilator was performed--reexamined and showed moderate improvement in luminal narrowing; entire examined stomach and duodenum was normal (no biopsies taken)  10/24/2016 Colonoscopy: anal stricture; inflammation characterized by serpentine ulcerations was found as medium-sized patches surrounded by normal mucosa in the rectum, as medium-sized patches surrounded by normal mucosa in the recto-sigmoid colon, as medium-sized patches surrounded by normal mucosa in the descending colon, as medium-sized patches surrounded by normal mucosa in the transverse colon, as medium-sized patches  surrounded by normal mucosa in the ascending colon and as  medium-sized patches surrounded by normal mucosa in the terminal  ileum. No sites were spared. This was moderate in severity, and when compared to previous examinations, the findings are worsened. Neoterminal ileum could not be intubated. (no biopsies taken)  2/27/17 CTE: Active CD involving the descending and sigmoid colon and rectum.  Decreased caliber at the ileocolic anastomosis with possible mucosal hyperenhancement.  Note is made of focal decreased caliber within the rectum, possibly transient.  Multiple perianal fistulas without evidence for abscess; heterogeneous enhancement within the uterus.  Appearance is not certainly secondary to leiomyomas.  Given postmenopausal age group, recommend further evaluation with pelvic ultrasound to assess  endometrium.  6/25/2018 MRE:  Suspected luminal narrowing to approximately 5 mm at level of the ileocolic anastomosis RIGHT lower quadrant without evidence for upstream dilatation of bowel questionable minimal hyper enhancement without evidence for mesenteric edema.  12/2019 EGD:  esophageal candiasis and dilation of esophageal stricture though not fully dilated due to risk of perforation with bleeding at time of dilation initially  12/2019 colonoscopy: buttock fistula opening that is closed and scarring and hypopigmentation of the skin. Severe anal stenosis and large perianal skin tags. Stricture dilated by KARON. The remainder of the colon was normal though scope aborted in transverse colon due to significant fixed looping.   1/29/19 EGD:  severe esophageal stricture of lower third of the esophagus with dilator passed through the scope and on reinsertion moderate mucosal disruption with normal stomach and duodenum. 12/14/20 EGD:  dilation of esophageal stricture again up to 12 mm with normal stomach and duodenum  12/2020 colonoscopy:  perianal skin tags with severe anal stenosis, prior end to side ileocolonic anastomosis of the transverse colon which could not be traversed with normal colon. Biopsies of transverse/descending/sigmoid normal, biopsies of rectum with focal bifid crypt architectural distortion.   6/21/21 MRE: Prior partial colectomy and terminal ileal resection with ileocolonic anastomosis in the RUQ.  Around the area of anastomosis there is tethering and fibrotic bands with multiple tortuous small-bowel loops. There is luminal narrowing at the anastomosis without significant upstream dilatation.  Cholecystectomy with mild prominence of the common bile duct measuring up to 7 mm.    Pertinent Labs:  6/2016 TB quantiferon neg  1/30/17 CRP 3.2  1/30/17 TPMT normal  1/30/17 vit D 25  1/30/17 hepatitis B testing negative   1/30/17 VZV IgG negative  2/2017 Garcia IFX level <1.0, Ab neg    Prior IBD  Therapies:  Prednisone  Sulfasalazine- rash  Dipentum- ineffective  6MP- bone marrow suppression  Flagyl/Cipro- effective   Asacol- ineffective  Colazal- ineffective  Imuran  Humira-rash- 2008 or 2009    Vaccinations:  Lab Results   Component Value Date    HEPBSAB Negative 01/30/2017     Lab Results   Component Value Date    HEPBSURFABQU Negative 05/03/2021    HEPBSURFABQU <3 05/03/2021     Lab Results   Component Value Date    HEPAIGG Negative 05/03/2021     Lab Results   Component Value Date    VARICELLAZOS 1.86 (H) 05/03/2021    VARICELLAINT Positive (A) 05/03/2021     Immunization History   Administered Date(s) Administered    COVID-19, MRNA, LN-S, PF (Pfizer) (Purple Cap) 03/01/2021, 03/22/2021    Hepatitis A / Hepatitis B 01/20/2011, 02/18/2011, 08/12/2011    Influenza - High Dose - PF (65 years and older) 10/28/2019    Influenza - Quadrivalent - PF *Preferred* (6 months and older) 09/18/2018, 11/04/2020    Pneumococcal Conjugate - 13 Valent 08/26/2015, 12/12/2016    Pneumococcal Polysaccharide - 23 Valent 05/03/2021    Tdap 04/27/2009     Tetanus (TdaP): recommended   HPV:    NA  Meningococcal: NA  Hepatitis B: recommended  Hepatitis A:  Recommended   MMR (live vaccine):  immune  Shingrix: recommended     Review of Systems   Constitutional: Negative for chills, fever and weight loss.   HENT:        No oral ulcers, dysphagia, oral thrush   Eyes: Negative for blurred vision, pain and redness.   Respiratory: Negative for cough and shortness of breath.    Cardiovascular: Negative for chest pain.   Gastrointestinal: Negative for abdominal pain, heartburn, nausea and vomiting.   Genitourinary: Negative for dysuria and hematuria.   Musculoskeletal: Negative for back pain and joint pain.   Skin: Negative for rash.   Psychiatric/Behavioral: Negative for depression. The patient is not nervous/anxious and does not have insomnia.       All Medical History/Surgical History/Family History/Social History/Allergies  have been reviewed and updated in EMR    Review of patient's allergies indicates:   Allergen Reactions    Amlodipine Rash    Sulfa (sulfonamide antibiotics) Hives     Other reaction(s): Rash     No outpatient medications have been marked as taking for the 2/15/22 encounter (Office Visit) with Diamond Barbour MD.     Vital Signs:  There were no vitals taken for this visit.     Physical Exam     Labs:   Lab Results   Component Value Date    CRP 3.5 05/03/2021     Lab Results   Component Value Date    HEPBSAG Negative 05/03/2021    HEPBCAB Negative 05/03/2021     Lab Results   Component Value Date    TBGOLDPLUS Negative 05/03/2021     Lab Results   Component Value Date    GWNAFCYY16YX 26 (L) 05/03/2021    CJSBOXZI69 >2000 (H) 05/03/2021     Lab Results   Component Value Date    WBC 8.34 05/03/2021    HGB 10.3 (L) 05/03/2021    HCT 33.5 (L) 05/03/2021    MCV 93 05/03/2021     05/03/2021     Lab Results   Component Value Date    CREATININE 1.2 05/03/2021    ALBUMIN 3.6 05/03/2021    BILITOT 0.4 05/03/2021    ALKPHOS 100 05/03/2021    AST 26 05/03/2021    ALT 19 05/03/2021     Assessment/Plan:  Aline Fuller is a 61 y.o. female with Crohn's disease S/P surgery with anal stricture and anastomotic/ileum stricture (h/o perianal and buttock fistulas with abscess with severe anal stricture dilation, S/P multiple surgeries including partial colectomy in 1973, 1979 and 1997),  history of breast cancer (dxed 4/2014, lumpectomy 5/2014, chemo/XRT and then tamoxifen), GERD with esophageal stricture who has ongoing intermittent dysphagia and constipation likely related to esophageal stricture an anal stricture.  She is taking MiraLax more regularly and chews her food well with a low residue diet and eating more smoothies.  She is scheduled for an EGD/colonoscopy with Dr. Polo on 3/17/2022 at which time dilations will be performed appropriately.  I will get an MRE at the time of her next follow-up visit in May 2022.   Most recently we have optimized her infliximab and she is currently on Inflectra 10 mg/kg every 4 weeks with an improvement of her drug levels from 3-15.    # Crohn's disease S/P surgery (ileum, colon, h/o perianal and buttock fistulas with abscess with severe anal stricture dilation, S/P multiple surgeries including partial colectomy in 1973, 1979 and 1997)  - note:  history of breast cancer (dxed 4/2014, lumpectomy 5/2014, chemo/XRT and then tamoxifen)  - anal stricture with constipation- drink lots of water, miralax, colonoscopy with dilation 3/17/22  - Anastomotic/SB stricture and tethering- no upstream dilation, unable to evaluate mucosa on scope, no obvious inflammation on MRE, warned pt of SBO symptoms in past, low residue diet/chew food well- repeat MRE 5/2022 same day as next visit   - continue inflectra 10 mg/kg q 4 weeks  - colonoscopy 3/17/22  - vitamin B12 (5/2021 >2000)- continue vit B12 1000 mcg IM q 4 weeks- prescribed by hematology  - drug monitoring labs: CBC/CMP q 6 mos (5/2022), TPMT (1/2017 TPMT 32.6), TB quantiferon (5/2022), Hep B testing (5/2022)  - TDM: trough remicade levels/abs 7/2022    # Distal esophageal stricture  - etiology unclear- GERD?  - last dilated 12/2020 with improved symptoms  - symptomatic  - EGD 3/17/22- will likely need dilation    # IBD specific health maintenance:  CRC risk- sx 1972, >1/3 colon, surveillance colonoscopy q 1-2 years- 12/2021  Skin exam yearly- normal 11/2020, next was due 11/2021 and pt to schedule with local dermatologist, Dr. Shapley  Osteopenia- postmenopausal, unable to swallow calcium supplements  Pap smear yearly- normal 11/2021, next due 11/2022  Vitamin D (5/2021 26)- continue vit D 3 takes two---5000 IU/d=10,000 IU/d  Vaccines: no live vaccines, will get hep A, B, tetanus and shingrix- get at local pharmacy    Follow up: 3 mos    The patient location is: Home  The chief complaint leading to consultation is: Crohn's disease    Visit type:  audiovisual    Face to Face time with patient: 20 minutes  40 minutes of total time spent on the encounter, which includes face to face time and non-face to face time preparing to see the patient (eg, review of tests), Obtaining and/or reviewing separately obtained history, Documenting clinical information in the electronic or other health record, Independently interpreting results (not separately reported) and communicating results to the patient/family/caregiver, or Care coordination (not separately reported).     Each patient to whom he or she provides medical services by telemedicine is:  (1) informed of the relationship between the physician and patient and the respective role of any other health care provider with respect to management of the patient; and (2) notified that he or she may decline to receive medical services by telemedicine and may withdraw from such care at any time.    Diamond Barbour MD  Department of Gastroenterology  Medical Director, Inflammatory Bowel Disease

## 2022-03-17 ENCOUNTER — HOSPITAL ENCOUNTER (OUTPATIENT)
Facility: HOSPITAL | Age: 62
Discharge: HOME | End: 2022-03-17
Attending: INTERNAL MEDICINE | Admitting: INTERNAL MEDICINE
Payer: COMMERCIAL

## 2022-03-17 ENCOUNTER — ANESTHESIA (OUTPATIENT)
Dept: ENDOSCOPY | Facility: HOSPITAL | Age: 62
End: 2022-03-17
Payer: COMMERCIAL

## 2022-03-17 ENCOUNTER — ANESTHESIA EVENT (OUTPATIENT)
Dept: ENDOSCOPY | Facility: HOSPITAL | Age: 62
End: 2022-03-17
Payer: COMMERCIAL

## 2022-03-17 VITALS
OXYGEN SATURATION: 99 % | RESPIRATION RATE: 18 BRPM | HEIGHT: 66 IN | HEART RATE: 90 BPM | BODY MASS INDEX: 24.91 KG/M2 | DIASTOLIC BLOOD PRESSURE: 73 MMHG | SYSTOLIC BLOOD PRESSURE: 130 MMHG | TEMPERATURE: 98 F | WEIGHT: 155 LBS

## 2022-03-17 DIAGNOSIS — K62.4 ANAL STRICTURE: ICD-10-CM

## 2022-03-17 DIAGNOSIS — K22.2 ESOPHAGEAL STRICTURE: Primary | ICD-10-CM

## 2022-03-17 DIAGNOSIS — K50.818 CROHN'S DISEASE OF BOTH SMALL AND LARGE INTESTINE WITH OTHER COMPLICATION: ICD-10-CM

## 2022-03-17 DIAGNOSIS — K50.10 CROHN'S COLITIS: ICD-10-CM

## 2022-03-17 PROCEDURE — 43249 ESOPH EGD DILATION <30 MM: CPT | Performed by: INTERNAL MEDICINE

## 2022-03-17 PROCEDURE — 37000009 HC ANESTHESIA EA ADD 15 MINS: Performed by: INTERNAL MEDICINE

## 2022-03-17 PROCEDURE — 25000003 PHARM REV CODE 250: Performed by: NURSE ANESTHETIST, CERTIFIED REGISTERED

## 2022-03-17 PROCEDURE — 88305 TISSUE EXAM BY PATHOLOGIST: CPT | Mod: 26,,, | Performed by: PATHOLOGY

## 2022-03-17 PROCEDURE — 43249 ESOPH EGD DILATION <30 MM: CPT | Mod: 51,,, | Performed by: INTERNAL MEDICINE

## 2022-03-17 PROCEDURE — E9220 PRA ENDO ANESTHESIA: HCPCS | Mod: ,,, | Performed by: NURSE ANESTHETIST, CERTIFIED REGISTERED

## 2022-03-17 PROCEDURE — 63600175 PHARM REV CODE 636 W HCPCS: Performed by: NURSE ANESTHETIST, CERTIFIED REGISTERED

## 2022-03-17 PROCEDURE — 45380 COLONOSCOPY AND BIOPSY: CPT | Mod: ,,, | Performed by: INTERNAL MEDICINE

## 2022-03-17 PROCEDURE — 37000008 HC ANESTHESIA 1ST 15 MINUTES: Performed by: INTERNAL MEDICINE

## 2022-03-17 PROCEDURE — 88305 TISSUE EXAM BY PATHOLOGIST: ICD-10-PCS | Mod: 26,,, | Performed by: PATHOLOGY

## 2022-03-17 PROCEDURE — 88305 TISSUE EXAM BY PATHOLOGIST: CPT | Mod: 59 | Performed by: PATHOLOGY

## 2022-03-17 PROCEDURE — E9220 PRA ENDO ANESTHESIA: ICD-10-PCS | Mod: ,,, | Performed by: NURSE ANESTHETIST, CERTIFIED REGISTERED

## 2022-03-17 PROCEDURE — 45380 COLONOSCOPY AND BIOPSY: CPT | Performed by: INTERNAL MEDICINE

## 2022-03-17 PROCEDURE — 45380 PR COLONOSCOPY,BIOPSY: ICD-10-PCS | Mod: ,,, | Performed by: INTERNAL MEDICINE

## 2022-03-17 PROCEDURE — C1726 CATH, BAL DIL, NON-VASCULAR: HCPCS | Performed by: INTERNAL MEDICINE

## 2022-03-17 PROCEDURE — 27201012 HC FORCEPS, HOT/COLD, DISP: Performed by: INTERNAL MEDICINE

## 2022-03-17 PROCEDURE — 63600175 PHARM REV CODE 636 W HCPCS: Performed by: ANESTHESIOLOGY

## 2022-03-17 PROCEDURE — 43249 PR EGD, FLEX, W/BALL DILATION, < 30MM: ICD-10-PCS | Mod: 51,,, | Performed by: INTERNAL MEDICINE

## 2022-03-17 RX ORDER — SODIUM CHLORIDE 9 MG/ML
INJECTION, SOLUTION INTRAVENOUS CONTINUOUS
Status: DISCONTINUED | OUTPATIENT
Start: 2022-03-17 | End: 2022-03-17 | Stop reason: HOSPADM

## 2022-03-17 RX ORDER — PROPOFOL 10 MG/ML
VIAL (ML) INTRAVENOUS CONTINUOUS PRN
Status: DISCONTINUED | OUTPATIENT
Start: 2022-03-17 | End: 2022-03-17

## 2022-03-17 RX ORDER — HEPARIN 100 UNIT/ML
5 SYRINGE INTRAVENOUS ONCE
Status: COMPLETED | OUTPATIENT
Start: 2022-03-17 | End: 2022-03-17

## 2022-03-17 RX ORDER — FENTANYL CITRATE 50 UG/ML
INJECTION, SOLUTION INTRAMUSCULAR; INTRAVENOUS
Status: DISCONTINUED | OUTPATIENT
Start: 2022-03-17 | End: 2022-03-17

## 2022-03-17 RX ORDER — PHENYLEPHRINE HYDROCHLORIDE 10 MG/ML
INJECTION INTRAVENOUS
Status: DISCONTINUED | OUTPATIENT
Start: 2022-03-17 | End: 2022-03-17

## 2022-03-17 RX ORDER — PROPOFOL 10 MG/ML
VIAL (ML) INTRAVENOUS
Status: DISCONTINUED | OUTPATIENT
Start: 2022-03-17 | End: 2022-03-17

## 2022-03-17 RX ORDER — LIDOCAINE HCL/PF 100 MG/5ML
SYRINGE (ML) INTRAVENOUS
Status: DISCONTINUED | OUTPATIENT
Start: 2022-03-17 | End: 2022-03-17

## 2022-03-17 RX ORDER — ONDANSETRON 2 MG/ML
4 INJECTION INTRAMUSCULAR; INTRAVENOUS ONCE
Status: COMPLETED | OUTPATIENT
Start: 2022-03-17 | End: 2022-03-17

## 2022-03-17 RX ADMIN — PHENYLEPHRINE HYDROCHLORIDE 500 MCG: 10 INJECTION INTRAVENOUS at 01:03

## 2022-03-17 RX ADMIN — PROPOFOL 200 MCG/KG/MIN: 10 INJECTION, EMULSION INTRAVENOUS at 01:03

## 2022-03-17 RX ADMIN — FENTANYL CITRATE 25 MCG: 50 INJECTION, SOLUTION INTRAMUSCULAR; INTRAVENOUS at 01:03

## 2022-03-17 RX ADMIN — ONDANSETRON 4 MG: 2 INJECTION, SOLUTION INTRAMUSCULAR; INTRAVENOUS at 02:03

## 2022-03-17 RX ADMIN — Medication 100 MG: at 01:03

## 2022-03-17 RX ADMIN — PROPOFOL 50 MG: 10 INJECTION, EMULSION INTRAVENOUS at 01:03

## 2022-03-17 RX ADMIN — SODIUM CHLORIDE: 0.9 INJECTION, SOLUTION INTRAVENOUS at 12:03

## 2022-03-17 RX ADMIN — HEPARIN 500 UNITS: 100 SYRINGE at 02:03

## 2022-03-17 NOTE — TRANSFER OF CARE
"Anesthesia Transfer of Care Note    Patient: Aline Fuller    Procedure(s) Performed: Procedure(s) (LRB):  ESOPHAGOGASTRODUODENOSCOPY (EGD) (N/A)  Colonoscopy (N/A)    Patient location: PACU    Anesthesia Type: general    Transport from OR: Transported from OR on room air with adequate spontaneous ventilation    Post pain: adequate analgesia    Post assessment: no apparent anesthetic complications and tolerated procedure well    Post vital signs: stable    Level of consciousness: awake, alert and oriented    Nausea/Vomiting: no nausea/vomiting    Complications: none    Transfer of care protocol was followed      Last vitals:   Visit Vitals  BP (!) 170/77   Pulse 99   Temp 36.9 °C (98.4 °F)   Resp 15   Ht 5' 6" (1.676 m)   Wt 70.3 kg (155 lb)   SpO2 99%   Breastfeeding No   BMI 25.02 kg/m²     "

## 2022-03-17 NOTE — PROVATION PATIENT INSTRUCTIONS
Discharge Summary/Instructions after an Endoscopic Procedure  Patient Name: Aline Fuller  Patient MRN: [id number]  Patient YOB: 1960 Thursday, March 17, 2022  Jamie Polo MD  Dear patient,  As a result of recent federal legislation (The Federal Cures Act), you may   receive lab or pathology results from your procedure in your [facility]   account before your physician is able to contact you. Your physician or   their representative will relay the results to you with their   recommendations at their soonest availability.  Thank you,  RESTRICTIONS:  During your procedure today, you received medications for sedation.  These   medications may affect your judgment, balance and coordination.  Therefore,   for 24 hours, you have the following restrictions:   - DO NOT drive a car, operate machinery, make legal/financial decisions,   sign important papers or drink alcohol.    ACTIVITY:  Today: no heavy lifting, straining or running due to procedural   sedation/anesthesia.  The following day: return to full activity including work.  DIET:  Eat and drink normally unless instructed otherwise.     TREATMENT FOR COMMON SIDE EFFECTS:  - Mild abdominal pain, nausea, belching, bloating or excessive gas:  rest,   eat lightly and use a heating pad.  - Sore Throat: treat with throat lozenges and/or gargle with warm salt   water.  - Because air was used during the procedure, expelling large amounts of air   from your rectum or belching is normal.  - If a bowel prep was taken, you may not have a bowel movement for 1-3 days.    This is normal.  SYMPTOMS TO WATCH FOR AND REPORT TO YOUR PHYSICIAN:  1. Abdominal pain or bloating, other than gas cramps.  2. Chest pain.  3. Back pain.  4. Signs of infection such as: chills or fever occurring within 24 hours   after the procedure.  5. Rectal bleeding, which would show as bright red, maroon, or black stools.   (A tablespoon of blood from the rectum is not serious, especially  if   hemorrhoids are present.)  6. Vomiting.  7. Weakness or dizziness.  GO DIRECTLY TO THE NEAREST EMERGENCY ROOM IF YOU HAVE ANY OF THE FOLLOWING:      Difficulty breathing              Chills and/or fever over 101 F   Persistent vomiting and/or vomiting blood   Severe abdominal pain   Severe chest pain   Black, tarry stools   Bleeding- more than one tablespoon   Any other symptom or condition that you feel may need urgent attention  Your doctor recommends these additional instructions:  If any biopsies were taken, your doctors clinic will contact you in 1 to 2   weeks with any results.  - Patient has a contact number available for emergencies.  The signs and   symptoms of potential delayed complications were discussed with the   patient.  Return to normal activities tomorrow.  Written discharge   instructions were provided to the patient.   - Discharge patient to home.   - Advance diet as tolerated.   - Continue present medications.   - Repeat upper endoscopy in 3 months for retreatment.   For questions, problems or results please call your physician - Jamie Polo MD at Work:  [phone].  [facility], EMERGENCY ROOM PHONE NUMBER: [phone]  IF A COMPLICATION OR EMERGENCY SITUATION ARISES AND YOU ARE UNABLE TO REACH   YOUR PHYSICIAN - GO DIRECTLY TO THE EMERGENCY ROOM.  Jamie Polo MD  3/17/2022 1:23:31 PM  This report has been verified and signed electronically.  Dear patient,  As a result of recent federal legislation (The Federal Cures Act), you may   receive lab or pathology results from your procedure in your [facility]   account before your physician is able to contact you. Your physician or   their representative will relay the results to you with their   recommendations at their soonest availability.  Thank you,  PROVATION

## 2022-03-17 NOTE — ANESTHESIA PREPROCEDURE EVALUATION
03/17/2022  Aline Fuller is a 61 y.o., female.    Past Medical History:   Diagnosis Date    Anemia     Crohn's disease S/P multiple surgery (ileum, colonh/o perianal and buttock fistulas with abscess with severe anal stricture requiring frequent dilation, h/o ileocolonic resections) 1/31/2017    1973 large bowel resection 1979 further colonic resection 12/17/2010: EUA, dilation of rectal stricture and placement of setons Xs 4    Esophageal stricture     History of Breast cancer dxed 4/2014 4/2014 diagnosed, Lumpectomy 5/2014, S/P Chemo/XRT completed Nov/Dec 2014, on tamoxifen    Immunosuppressed status     Vitamin B12 deficiency     Vitamin D deficiency 1/31/2017     Patient Active Problem List   Diagnosis    Breast cancer    Crohn's disease S/P multiple surgery (h/o perianal and buttock fistulas with abscess with severe anal stricture requiring frequent dilation, h/o ileocolonic resections with SB/anastomotic stricture)    Vitamin D deficiency    Anal stricture with constipation    Esophageal stricture    Vitamin B12 deficiency    Immunosuppressed status     Past Surgical History:   Procedure Laterality Date    APPENDECTOMY      BIOPSY OF ANUS  4/10/2019    Procedure: BIOPSY, ANUS;  Surgeon: Rhett Aparicio MD;  Location: [facility] OR [address] [place] [address];  Service: Colon and Rectal;;    BREAST LUMPECTOMY Right     CHOLECYSTECTOMY      colon resections      COLON SURGERY  1972 or 1973, 1979    COLONOSCOPY      COLONOSCOPY N/A 10/24/2016    Procedure: COLONOSCOPY;  Surgeon: Jamie Polo MD;  Location: [address]);  Service: Endoscopy;  Laterality: N/A;    COLONOSCOPY N/A 8/29/2017    Procedure: COLONOSCOPY;  Surgeon: Diamond Barbour MD;  Location: [facility] ([address] [place] [address]);  Service: Endoscopy;  Laterality: N/A;  schedule as 45 minute case    COLONOSCOPY N/A 10/10/2018    Procedure:  COLONOSCOPY;  Surgeon: Diamond Barbour MD;  [place]  Service: Endoscopy;  Laterality: N/A;    COLONOSCOPY N/A 12/19/2019    Procedure: COLONOSCOPY;  Surgeon: Diamond Barbour MD;  Location: [facility] ENDO (4TH FLR);  Service: Endoscopy;  Laterality: N/A;    COLONOSCOPY N/A 12/14/2020    Procedure: COLONOSCOPY;  Surgeon: Jamie Polo MD;  [place]  Service: Endoscopy;  Laterality: N/A;    ESOPHAGOGASTRODUODENOSCOPY      ESOPHAGOGASTRODUODENOSCOPY N/A 10/10/2018    Procedure: EGD (ESOPHAGOGASTRODUODENOSCOPY);  Surgeon: Diamond Barbour MD;  [place]  Service: Endoscopy;  Laterality: N/A;    ESOPHAGOGASTRODUODENOSCOPY N/A 12/19/2019    Procedure: EGD (ESOPHAGOGASTRODUODENOSCOPY);  Surgeon: Diamond Barbour MD;  [place]  Service: Endoscopy;  Laterality: N/A;    ESOPHAGOGASTRODUODENOSCOPY N/A 1/29/2020    Procedure: ESOPHAGOGASTRODUODENOSCOPY (EGD);  Surgeon: Jamie Polo MD;  [place]  Service: Endoscopy;  Laterality: N/A;  with Dr. Polo last week of January or early February per Dr. Trevino-BB    ESOPHAGOGASTRODUODENOSCOPY N/A 12/14/2020    Procedure: EGD (ESOPHAGOGASTRODUODENOSCOPY);  Surgeon: Jamie Polo MD;  [place]  Service: Endoscopy;  Laterality: N/A;  rapid-3 hours from any testing site-pt requested this time-tb    EXAMINATION UNDER ANESTHESIA Left 4/10/2019    Procedure: Exam under anesthesia botox;  Surgeon: Rhett Aparicio MD;  [facility];  Service: Colon and Rectal;  Laterality: Left;  S/P- Medi port placed to upper left chest    fistula repairs      KNEE SURGERY  left    SMALL INTESTINE SURGERY      1997    UPPER GASTROINTESTINAL ENDOSCOPY             Pre-op Assessment    I have reviewed the Patient Summary Reports.    I have reviewed the NPO Status.   I have reviewed the Medications.     Review of Systems  Anesthesia Hx:  Denies Family  Hx of Anesthesia complications.   Denies Personal Hx of Anesthesia complications.   Hematology/Oncology:  Hematology Normal   Oncology Normal     EENT/Dental:EENT/Dental Normal   Cardiovascular:  Cardiovascular Normal     Pulmonary:  Pulmonary Normal    Renal/:  Renal/ Normal     Hepatic/GI:  Hepatic/GI Normal    Musculoskeletal:  Musculoskeletal Normal    Neurological:  Neurology Normal    Endocrine:  Endocrine Normal    Dermatological:  Skin Normal    Psych:  Psychiatric Normal           Physical Exam  General: Well nourished    Airway:  Mallampati: II   Mouth Opening: Normal  TM Distance: Normal  Tongue: Normal  Neck ROM: Normal ROM        Anesthesia Plan  Type of Anesthesia, risks & benefits discussed:    Anesthesia Type: Gen Natural Airway  Intra-op Monitoring Plan: Standard ASA Monitors  Induction:  IV  Informed Consent: Informed consent signed with the Patient and all parties understand the risks and agree with anesthesia plan.  All questions answered. Patient consented to blood products? No  ASA Score: 2  Day of Surgery Review of History & Physical: H&P Update referred to the surgeon/provider.    Ready For Surgery From Anesthesia Perspective.     .

## 2022-03-17 NOTE — INTERVAL H&P NOTE
The patient has been examined and the H&P has been reviewed:    There is no interval changes since last encounter.    EGD/Colonoscopy; Esophageal dysphagia and Crohn's disease  Sedation: GA  ASA: Per anesthesia  Mallampati: Per anesthesia    Endoscopy risks, benefits and alternative options discussed and understood by patient/family.          There are no hospital problems to display for this patient.

## 2022-03-17 NOTE — ANESTHESIA POSTPROCEDURE EVALUATION
Anesthesia Post Evaluation    Patient: Aline Fuller    Procedure(s) Performed: Procedure(s) (LRB):  ESOPHAGOGASTRODUODENOSCOPY (EGD) (N/A)  Colonoscopy (N/A)    Final Anesthesia Type: general      Patient location during evaluation: PACU  Patient participation: Yes- Able to Participate  Level of consciousness: awake and alert and oriented  Post-procedure vital signs: reviewed and stable  Pain management: adequate  Airway patency: patent    PONV status at discharge: nausea (inadequately controlled) (giving zofran)  Anesthetic complications: no      Cardiovascular status: stable  Respiratory status: unassisted, spontaneous ventilation and room air  Hydration status: euvolemic  Follow-up not needed.          Vitals Value Taken Time   /64 03/17/22 1356   Temp 36.5 °C (97.7 °F) 03/17/22 1356   Pulse 84 03/17/22 1356   Resp 16 03/17/22 1356   SpO2 99 % 03/17/22 1356         No case tracking events are documented in the log.      Pain/Jil Score: Jil Score: 8 (3/17/2022  1:56 PM)

## 2022-03-17 NOTE — PROVATION PATIENT INSTRUCTIONS
Discharge Summary/Instructions after an Endoscopic Procedure  Patient Name: Aline Fuller  Patient MRN: [id number]  Patient YOB: 1960 Thursday, March 17, 2022  Jamie Polo MD  Dear patient,  As a result of recent federal legislation (The Federal Cures Act), you may   receive lab or pathology results from your procedure in your [facility]   account before your physician is able to contact you. Your physician or   their representative will relay the results to you with their   recommendations at their soonest availability.  Thank you,  RESTRICTIONS:  During your procedure today, you received medications for sedation.  These   medications may affect your judgment, balance and coordination.  Therefore,   for 24 hours, you have the following restrictions:   - DO NOT drive a car, operate machinery, make legal/financial decisions,   sign important papers or drink alcohol.    ACTIVITY:  Today: no heavy lifting, straining or running due to procedural   sedation/anesthesia.  The following day: return to full activity including work.  DIET:  Eat and drink normally unless instructed otherwise.     TREATMENT FOR COMMON SIDE EFFECTS:  - Mild abdominal pain, nausea, belching, bloating or excessive gas:  rest,   eat lightly and use a heating pad.  - Sore Throat: treat with throat lozenges and/or gargle with warm salt   water.  - Because air was used during the procedure, expelling large amounts of air   from your rectum or belching is normal.  - If a bowel prep was taken, you may not have a bowel movement for 1-3 days.    This is normal.  SYMPTOMS TO WATCH FOR AND REPORT TO YOUR PHYSICIAN:  1. Abdominal pain or bloating, other than gas cramps.  2. Chest pain.  3. Back pain.  4. Signs of infection such as: chills or fever occurring within 24 hours   after the procedure.  5. Rectal bleeding, which would show as bright red, maroon, or black stools.   (A tablespoon of blood from the rectum is not serious, especially  if   hemorrhoids are present.)  6. Vomiting.  7. Weakness or dizziness.  GO DIRECTLY TO THE NEAREST EMERGENCY ROOM IF YOU HAVE ANY OF THE FOLLOWING:      Difficulty breathing              Chills and/or fever over 101 F   Persistent vomiting and/or vomiting blood   Severe abdominal pain   Severe chest pain   Black, tarry stools   Bleeding- more than one tablespoon   Any other symptom or condition that you feel may need urgent attention  Your doctor recommends these additional instructions:  If any biopsies were taken, your doctors clinic will contact you in 1 to 2   weeks with any results.  - Patient has a contact number available for emergencies.  The signs and   symptoms of potential delayed complications were discussed with the   patient.  Return to normal activities tomorrow.  Written discharge   instructions were provided to the patient.   - Discharge patient to home.   - Advance diet as tolerated.   - Continue present medications.   - Await pathology results.   - Repeat colonoscopy in 2 years for surveillance.  For questions, problems or results please call your physician - Jamie Polo MD at Work:  [phone].  [facility], EMERGENCY ROOM PHONE NUMBER: [phone]  IF A COMPLICATION OR EMERGENCY SITUATION ARISES AND YOU ARE UNABLE TO REACH   YOUR PHYSICIAN - GO DIRECTLY TO THE EMERGENCY ROOM.  Jamie Polo MD  3/17/2022 1:54:27 PM  This report has been verified and signed electronically.  Dear patient,  As a result of recent federal legislation (The Federal Cures Act), you may   receive lab or pathology results from your procedure in your [facility]   account before your physician is able to contact you. Your physician or   their representative will relay the results to you with their   recommendations at their soonest availability.  Thank you,  PROVATION

## 2022-03-17 NOTE — ADDENDUM NOTE
Addendum  created 03/17/22 1417 by Mathieu Interiano Jr., MD    Order list changed, Pharmacy for encounter modified

## 2022-03-28 LAB
FINAL PATHOLOGIC DIAGNOSIS: NORMAL
GROSS: NORMAL
Lab: NORMAL

## 2022-03-29 ENCOUNTER — TELEPHONE (OUTPATIENT)
Dept: GASTROENTEROLOGY | Facility: CLINIC | Age: 62
End: 2022-03-29
Payer: COMMERCIAL

## 2022-05-02 ENCOUNTER — OFFICE VISIT (OUTPATIENT)
Dept: GASTROENTEROLOGY | Facility: CLINIC | Age: 62
End: 2022-05-02
Payer: COMMERCIAL

## 2022-05-02 ENCOUNTER — HOSPITAL ENCOUNTER (OUTPATIENT)
Dept: RADIOLOGY | Facility: HOSPITAL | Age: 62
Discharge: HOME | End: 2022-05-02
Attending: INTERNAL MEDICINE
Payer: COMMERCIAL

## 2022-05-02 VITALS
TEMPERATURE: 99 F | DIASTOLIC BLOOD PRESSURE: 76 MMHG | BODY MASS INDEX: 24.55 KG/M2 | SYSTOLIC BLOOD PRESSURE: 171 MMHG | HEART RATE: 84 BPM | WEIGHT: 152.13 LBS | OXYGEN SATURATION: 99 %

## 2022-05-02 DIAGNOSIS — K50.818 CROHN'S DISEASE OF BOTH SMALL AND LARGE INTESTINE WITH OTHER COMPLICATION: Primary | ICD-10-CM

## 2022-05-02 DIAGNOSIS — K50.818 CROHN'S DISEASE OF BOTH SMALL AND LARGE INTESTINE WITH OTHER COMPLICATION: ICD-10-CM

## 2022-05-02 PROCEDURE — A9585 GADOBUTROL INJECTION: HCPCS | Performed by: INTERNAL MEDICINE

## 2022-05-02 PROCEDURE — 25500020 PHARM REV CODE 255: Performed by: INTERNAL MEDICINE

## 2022-05-02 PROCEDURE — 72197 MRI PELVIS W/O & W/DYE: CPT | Mod: 26,,, | Performed by: RADIOLOGY

## 2022-05-02 PROCEDURE — 99215 OFFICE O/P EST HI 40 MIN: CPT | Mod: S$GLB,,, | Performed by: INTERNAL MEDICINE

## 2022-05-02 PROCEDURE — 72197 MRI PELVIS W/O & W/DYE: CPT | Mod: TC

## 2022-05-02 PROCEDURE — 99215 PR OFFICE/OUTPT VISIT, EST, LEVL V, 40-54 MIN: ICD-10-PCS | Mod: S$GLB,,, | Performed by: INTERNAL MEDICINE

## 2022-05-02 PROCEDURE — 72197 MRI ENTEROGRAPHY: ICD-10-PCS | Mod: 26,,, | Performed by: RADIOLOGY

## 2022-05-02 PROCEDURE — 74183 MRI ABD W/O CNTR FLWD CNTR: CPT | Mod: 26,,, | Performed by: RADIOLOGY

## 2022-05-02 PROCEDURE — 74183 MRI ENTEROGRAPHY: ICD-10-PCS | Mod: 26,,, | Performed by: RADIOLOGY

## 2022-05-02 RX ORDER — GADOBUTROL 604.72 MG/ML
10 INJECTION INTRAVENOUS
Status: COMPLETED | OUTPATIENT
Start: 2022-05-02 | End: 2022-05-02

## 2022-05-02 RX ADMIN — GADOBUTROL 10 ML: 604.72 INJECTION INTRAVENOUS at 02:05

## 2022-05-02 NOTE — PATIENT INSTRUCTIONS
- I will message you about MRI  - continue chewing food well and low residue diet  - continue inflectra every 4 weeks   - please visit with your dermatologist about skin conditions  - if you get any vaccines let us know date by email  - when you go for your skin exams be sure to do yearly skin checks

## 2022-05-02 NOTE — PROGRESS NOTES
[facility]             Inflammatory Bowel Disease   Follow-up  Note              TODAY'S VISIT DATE:  5/2/2022    Chief Complaint:   Chief Complaint   Patient presents with    Crohn's Disease     PCP: Primary Doctor No    Previous History:  Aline Fuller is a 61 y.o.  female with Crohn's disease S/P surgery with anal stricture and anastomotic/ileum stricture (h/o perianal and buttock fistulas with abscess with severe anal stricture dilation, S/P multiple surgeries including partial colectomy in 1973, 1979 and 1997),  history of breast cancer (dxed 4/2014, lumpectomy 5/2014, chemo/XRT and then tamoxifen), GERD with esophageal stricture.  She initially started with symptoms of abdominal pain, weight loss, and diarrhea in 1972 and had an ex lap in 1973 with 6 inches of colon removed that was non-diagnostic for CD.  She was not diagnosed until 1979 after her second ex lap with 6 inches of small bowel removed.  She was initially treated with several courses of prednisone.  Early in her diagnosis, she took sulfasalazine though it was discontinued due to a rash, dipentum though it was not effective long term, and imuran though it had to be discontinued due to bone marrow suppression.  Her symptoms progressed which resulted in an SBO that prompted her third ex lap in 1997 that resulted in 6 inches of small bowel being ressected.  She was in clinic remission and on no medications from 1997 to 2001 at which time she recalls having a fistula and and abscess that required I&D and ABX.  She had recurrence of fistula and abscess in 2004 or 2005.  She also took asacol, colazal, 6MP, and imuran that were all ineffective though possible anemia with 6MP/Imuran.  In 2008 she started humira with induction and maintenance but discontinued after 3 months because patient broke out with a rash though she also felt it was ineffective.   She established care with Dr. Polo in 2010 and had a colonoscopy 10/2010  which showed fistulas, anal stricture dilated, and active inflammation at the ileocolonic anastomosis that could not be traversed.  In 12/2010 she had perianal Crohn's disease that was treated with dilation of rectal stricture and seton placement.  She started remicade 2/2011.  MRI 4/2011 showed multiple perianal fistulas with seton removal in approximately 5/2011.  At some time in 2012, her remicade dose frequency was increased to 5 mg/kg to every 6 weeks.  She was diagnosed with stage 1 breast cancer 4/2014  that was treated with lumpectomy 5/2014 followed by chemo/XRT with tamoxifen. Since 2012 she continues on remicade 5 mg/kg every 6 weeks though it was held during chemotherapy from 6/2014-4/2015 and restarted with induction and maintenance of 5 mg/kg every 6 weeks.   Colonoscopy 10/2016 showed continued anal stricture with inflammation throughout the colon and neoterminal ileum could not be intubated.  In 2/2017 [facility] trough IFX levels/abs were undetectable with no ABs.  In spring 2017 remicade was optimized to 10 mg/kg every 6 weeks. CTE 2/27/17 showed active CD involving the descending and sigmoid colon and rectum.  Decreased caliber at the ileocolic anastomosis with possible mucosal hyperenhancement.  Note is made of focal decreased caliber within the rectum, possibly transient.  Multiple perianal fistulas without evidence for abscess; heterogeneous enhancement within the uterus.  Appearance is not certainly secondary to leiomyomas.  Given postmenopausal age group, recommend further evaluation with pelvic ultrasound to assess endometrium.   She was last seen in our clinic 8/2017 at which time we recommended yearly SB imaging with MRE and anal stricture dilation of constipation returns by Dr. Mcclure with EUA though o/w plans to dilate at time of colonoscopy yearly.  MRE 6/25/18 showed some suspected luminal narrowing to approximately 5 mm at level of the ileocolic anastomosis RIGHT lower quadrant without  evidence for upstream dilatation of bowel questionable minimal hyper enhancement without evidence for mesenteric edema. In 12/2019 she had EGD and colonoscopy with EGD showing diffuse esohageal candiasis and dilation of esophageal stricture though not fully dilated due to risk of perforation with bleeding at time of dilation initially. In 4/2019 she had EUA with Dr. Aparicio with anal biopsies showing no dysplasia.   In 12/2019 patient had colonoscopy which showed buttock fistula opening that is closed and scarring and hypopigmentation of the skin. Severe anal stenosis and large perianal skin tags. Stricture dilated by KARON. The remainder of the colon was normal though scope aborted in transverse colon due to significant fixed looping. After her scopes 12/2019 pt was upset b/c she did not recall plan of care outlined after procedures so I called and explained that with this tight anal stricture we feel surgery with colostomy was ideal given risk of cancer and obstruction though she did not wish to make a f/u appt with CRS at that time. Due to esophageal candidiasis and immunosuppression she was given a 3 week course of diflucan.  At that time she continued follow up with our recommendations and repeat EGD with Dr. Polo for esophageal stricture dilation after candidiasis treatment. Repeat EGD 1/29/19 showed a severe esophageal stricture of lower third of the esophagus with dilator passed through the scope and on reinsertion moderate mucosal disruption with normal stomach and duodenum. On 12/14/20 patient had dilation of esophageal stricture again up to 12 mm with normal stomach and duodenum. Colonoscopy 12/2020 showed perianal skin tags with severe anal stenosis, prior end to side ileocolonic anastomosis of the transverse colon which could not be traversed with normal colon. Biopsies of transverse/descending/sigmoid normal, biopsies of rectum with focal bifid crypt architectural distortion. Per patient Dr. Aparicio was  present and dilated stricture during the scope with Dr. Polo. She continues on remicade 10 mg/kg every 6 weeks (([id number]--reinitiated 2016 with induction and then every 6 weeks maintenance, 10 mg/kg q 6 weeks spring 2017) and she has 1-3 BMs/day, loose to soft, no blood and has rare incontinence every few weeks. Once every few weeks she has a nocturnal BM. Often her bowel movements depends on what she ate and what time she ate. She has some dysphagia with dry solid (chicken breast, fish) and if she eats small bites and chews things slowly then overall does okay. Every few weeks has no bowel movements with bloating and difficulty evacuating which lasts for 2-3 days and occurs every 2-3 days and then once constipation resolves she then has diarrhea.  She drinks a lot of water and occasionally a fleets enemas. MRE 6/2021 around the area of anastomosis there is tethering and fibrotic bands with multiple tortuous SB loops and luminal narrowing at the anastomosis without significant upstream dilatation, cholecystectomy with mild CBD prominence.  On 1/3/2022 trough infliximab level 15/antibodies negative on inflectra 10 mg/kg q 4 weeks.   EGD/colonoscopy deferred from 12/2021 to 3/2022.      Interval History:  - current IBD meds: inflectra 10 mg/kg q 4 weeks (10 mg/kg q 4 weeks from q 6 weeks started 11/2022, LD 1/31, ND 2/28)- itching with infusions but no rash  - 1-2 soft BMs/d, no blood, twice a week has nocturnal BM- some constipation (on miralax qod)- straining, rectal pain, lower abd pain  - 3/17/22 EGD (Dr. Jamie Polo): benign appearing esophageal stenosis at GE junction dilated to 10 mm  - 3/17/22 colonoscopy (Dr. Jamie Polo): hypertrophied skin tags, benign appearing anal stenosis traversed after digital dilation, severe ileocolonic anastomotic stricture not traversed with solitary apthous ulcer at the anastamosis with remainder of colon normal. Biopsies of ascending/transverse/descending/sigmoid and  rectum normal  - 22 MRE: pending  - 21 IFX level 3.3, 2021 IFX level 15  - since switching from Remicade to Inflectra patient feels that she has had increased itching and possible increase in eczema though has not seen her dermatologist.  - dysphagia- resolved  - heartburn- improved   - NSAID use: No  - Narcotic use: No  - Alternative/complementary meds for IBD: No    Previous Surgeries:  1973 large bowel resection  1979 further colonic resection  2010: EUA, dilation of rectal stricture and placement of setons Xs 4    Last pertinent Endoscopy/Imagin2011 MRI A/P: MULTIPLE COMPLEX-APPEARING PERIANAL FISTULAS WHICH CONNECT WITH THE GLUTEAL SURFACE BILATERALLY, AND ONE OF WHICH APPEARS TO CROSS MIDLINE CONNECTING THE RIGHT AND LEFT SIDE AT THE LEVEL OF THE ISCHIAL TUBEROSITY.  NO EVIDENCE OF DEFINITE FISTULOUS COMMUNICATION TO THE VAGINA OR BLADDER; CIRCUMFERENTIAL AND ASYMMETRIC LEFT RECTAL THICKENING WITHOUT DISCRETE MASS IDENTIFIED.  THIS IS NONSPECIFIC AND COULD BE RELATED TO PRIOR SURGERY OR INFLAMMATORY OR INFECTIOUS PROCESS; FIBROID UTERUS  2018 MRE:  Suspected luminal narrowing to approximately 5 mm at level of the ileocolic anastomosis RIGHT lower quadrant without evidence for upstream dilatation of bowel questionable minimal hyper enhancement without evidence for mesenteric edema.   20 EGD:  dilation of esophageal stricture again up to 12 mm with normal stomach and duodenum  2020 colonoscopy:  perianal skin tags with severe anal stenosis, prior end to side ileocolonic anastomosis of the transverse colon which could not be traversed with normal colon. Biopsies of transverse/descending/sigmoid normal, biopsies of rectum with focal bifid crypt architectural distortion.   21 MRE: Prior partial colectomy and terminal ileal resection with ileocolonic anastomosis in the RUQ.  Around the area of anastomosis there is tethering and fibrotic bands with multiple tortuous  small-bowel loops. There is luminal narrowing at the anastomosis without significant upstream dilatation.  Cholecystectomy with mild prominence of the common bile duct measuring up to 7 mm.  3/17/22 EGD (Dr. Jamie Polo): benign appearing esophageal stenosis at GE junction dilated to 10 mm  3/17/22 colonoscopy (Dr. Jamie Polo): hypertrophied skin tags, benign appearing anal stenosis traversed after digital dilation, severe ileocolonic anastomotic stricture not traversed with solitary apthous ulcer at the anastamosis with remainder of colon normal. Biopsies of ascending/transverse/descending/sigmoid and rectum normal  22 MRE: pending    Therapeutic drug monitorin2017 [facility] IFX level <1.0, Ab neg  17 IFX 3.0/Abs neg  10/25/18 IFX 3.0/Abs neg  18 IFX 3.3/Abs neg  21 IFX level 3.3/ABs neg on remicade 10 mg/kg q 6 weeks  1/3/22 trough IFX level 15/Abs neg on inflectra 10 mg/kg q 4 weeks     Prior IBD Therapies:  Prednisone  Sulfasalazine- rash  Dipentum- ineffective  6MP- bone marrow suppression  Flagyl/Cipro- effective   Asacol- ineffective  Colazal- ineffective  Imuran  Humira-rash-  or     Vaccinations:  Lab Results   Component Value Date    HEPBSAB Negative 2017     Lab Results   Component Value Date    HEPBSURFABQU Negative 2021    HEPBSURFABQU <3 2021     Lab Results   Component Value Date    HEPAIGG Negative 2021     Lab Results   Component Value Date    VARICELLAZOS 1.86 (H) 2021    VARICELLAINT Positive (A) 2021     Immunization History   Administered Date(s) Administered    COVID-19, MRNA, LN-S, PF (Pfizer) (Purple Cap) 2021, 2021, 10/31/2021    Hepatitis A / Hepatitis B 2011, 2011, 2011    Influenza - High Dose - PF (65 years and older) 10/28/2019    Influenza - Quadrivalent - PF *Preferred* (6 months and older) 2018, 10/28/2019, 2020, 11/10/2021    Pneumococcal Conjugate - 13 Valent  08/26/2015, 12/12/2016    Pneumococcal Polysaccharide - 23 Valent 05/03/2021    Tdap 04/27/2009     Tetanus (TdaP): recommended   HPV:    NA  Meningococcal: NA  Hepatitis B: recommended  Hepatitis A:  Recommended   MMR (live vaccine):  immune  Shingrix: recommended     Review of Systems   Constitutional: Negative for chills, fever and weight loss.   HENT:        No oral ulcers, dysphagia, oral thrush   Eyes: Negative for blurred vision, pain and redness.   Respiratory: Negative for cough and shortness of breath.    Cardiovascular: Negative for chest pain.   Gastrointestinal: Negative for abdominal pain, heartburn, nausea and vomiting.   Genitourinary: Negative for dysuria and hematuria.   Musculoskeletal: Negative for back pain and joint pain.   Skin: Negative for rash.   Psychiatric/Behavioral: Negative for depression. The patient is not nervous/anxious and does not have insomnia.       All Medical History/Surgical History/Family History/Social History/Allergies have been reviewed and updated in EMR    Review of patient's allergies indicates:   Allergen Reactions    Amlodipine Rash    Sulfa (sulfonamide antibiotics) Hives     Other reaction(s): Rash     Outpatient Medications Marked as Taking for the 5/2/22 encounter (Office Visit) with Diamond Barbour MD   Medication Sig Dispense Refill    cholecalciferol, vitamin D3, 5,000 unit capsule Take 10,000 Units by mouth once daily. 1 Capsule Oral Every day      cyanocobalamin 1,000 mcg/mL injection 1,000 mcg every 30 days.   1    epoetin mario-epbx (RETACRIT INJ) every 14 (fourteen) days.      fluticasone propionate (FLONASE) 50 mcg/actuation nasal spray 1 spray by Each Nostril route once daily.      infliximab-dyyb (INFLECTRA IV) Inject 10 mg/kg into the vein every 28 days.      losartan (COZAAR) 50 MG tablet Take 50 mg by mouth once daily.      tamoxifen (NOLVADEX) 20 MG Tab Take 20 mg by mouth every evening .       Vital Signs:  BP (!) 171/76   Pulse 84    Temp 98.5 °F (36.9 °C)   Wt 69 kg (152 lb 1.9 oz)   SpO2 99%   BMI 24.55 kg/m²      Physical Exam  Constitutional:       General: She is not in acute distress.  Cardiovascular:      Rate and Rhythm: Normal rate and regular rhythm.      Pulses: Normal pulses.      Heart sounds: Normal heart sounds. No murmur heard.  Pulmonary:      Effort: Pulmonary effort is normal.      Breath sounds: Normal breath sounds.   Abdominal:      General: Bowel sounds are normal. There is no distension.      Palpations: Abdomen is soft.      Tenderness: There is abdominal tenderness (mild RLQ). There is no guarding or rebound.        Labs:   Lab Results   Component Value Date    CRP 3.5 05/03/2021     Lab Results   Component Value Date    HEPBSAG Negative 05/03/2021    HEPBCAB Negative 05/03/2021     Lab Results   Component Value Date    TBGOLDPLUS Negative 05/03/2021     Lab Results   Component Value Date    SZIVVDJL79HE 26 (L) 05/03/2021    JNYQLQBH24 >2000 (H) 05/03/2021     Lab Results   Component Value Date    WBC 8.34 05/03/2021    HGB 10.3 (L) 05/03/2021    HCT 33.5 (L) 05/03/2021    MCV 93 05/03/2021     05/03/2021     Lab Results   Component Value Date    CREATININE 1.2 05/03/2021    ALBUMIN 3.6 05/03/2021    BILITOT 0.4 05/03/2021    ALKPHOS 100 05/03/2021    AST 26 05/03/2021    ALT 19 05/03/2021     Assessment/Plan:  Aline Fuller is a 61 y.o. female with Crohn's disease S/P surgery with anal stricture and anastomotic/ileum stricture (h/o perianal and buttock fistulas with abscess with severe anal stricture dilation, S/P multiple surgeries including partial colectomy in 1973, 1979 and 1997),  history of breast cancer (dxed 4/2014, lumpectomy 5/2014, chemo/XRT and then tamoxifen), GERD with esophageal stricture who is feeling well with normal bowel movements and no longer having any symptoms from anal stricture or esophageal stricture since hit her scopes in March of 2022 with Dr. Polo.  He dilated the anal  stricture esophageal stricture though still cannot intubate her neoterminal ileum due to continued stricture at the ileocolonic anastomosis.  She got an MRE today and I am awaiting the results of this test.  Since at optimizing her infliximab from 10 mg/kg q 6 weeks to q 4 weeks her drug levels have increased from 3.3-15.  Since switching from Remicade to Inflectra patient feels that she has had increased itching and possible increase in eczema though has not seen her dermatologist and will plan to do this first and then we can consider changing back to brand name remicade if needed.    # Crohn's disease S/P surgery with anal stricture and ileocolonic anastomotic stricture (ileum, colon, h/o perianal and buttock fistulas with abscess with severe anal stricture dilation, S/P multiple surgeries including partial colectomy in 1973, 1979 and 1997)  - note:  history of breast cancer (dxed 4/2014, lumpectomy 5/2014, chemo/XRT and then tamoxifen)  -note: since switching from Remicade to Inflectra patient feels that she has had increased itching and possible increase in eczema though has not seen her dermatologist and will plan to do this first and then we can consider changing back to brand name remicade if needed  - continue inflectra 10 mg/kg q 4 weeks  - anal stricture- dilated 3/2022, asx  - anastomotic/SB stricture and tethering- no upstream dilation, unable to evaluate mucosa on scope, awaiting MRE results from today  - continue low residue diet and chewing food well   - colonoscopy 3/2023, sooner if anal stricture symptoms warrant  - vitamin B12 (5/2021 >2000)- continue vit B12 1000 mcg IM q 4 weeks- prescribed by hematology  - drug monitoring labs: CBC/CMP q 6 mos (today), TPMT (1/2017 TPMT 32.6), TB quantiferon (today), Hep B testing (today)  - TDM: trough remicade levels/abs 1/2023    # Distal esophageal stricture with GERD  - improved after esophageal stricture dilation 3/2022  - pt told to notify us immediately  if any dysphagia sx and we will get her in for urgent EGD, also if any recurrent sx should go to liquid diet    # IBD specific health maintenance:  CRC risk- sx 1972, >1/3 colon, surveillance colonoscopy q 1-2 years- 3/2023  Skin exam yearly- normal 11/2020, next was due 11/2021 and pt to schedule with local dermatologist, Dr. Shapley  Osteopenia- postmenopausal, unable to swallow calcium supplements  Pap smear yearly- normal 11/2021, next due 11/2022  Vitamin D (5/2021 26)- continue vit D 3 takes two---5000 IU/d=10,000 IU/d  Vaccines: no live vaccines, needs covid booster (scheduled), hep A, B, tetanus and shingrix- PCP office wont' do these so pt to schedule at local pharmacy and let us know dates so we can update immunization recorrds    Follow up: 6 mos video visit     Total time: 40 min    Diamond Barbour MD  Department of Gastroenterology  Medical Director, Inflammatory Bowel Disease

## 2022-05-03 ENCOUNTER — TELEPHONE (OUTPATIENT)
Dept: GASTROENTEROLOGY | Facility: CLINIC | Age: 62
End: 2022-05-03
Payer: COMMERCIAL

## 2022-05-03 NOTE — TELEPHONE ENCOUNTER
Called [facility] and spoke with Nehemiah GARCIA  - Confirmed patient is getting Inflectra 10mg/kg every 4 weeks. LD 4/25, ND 5/23.  - Currently only receiving Benadryl 25mg PO for pre medications.  - Therapy plan on patient profile faxed to the infusion center (# [phone]) so pre medications can be updated.  - Successful fax transmittal e-mail received.

## 2022-05-03 NOTE — TELEPHONE ENCOUNTER
----- Message from Diamond Barbour MD sent at 5/2/2022  3:41 PM CDT -----  Please call [facility] infusion pharmacy and be sure orders are from me for inflectra 10 mg/kg every 4 weeks and confirm the premeds. Also they are drawing labs with every infusion and she is anemic so please ask them to stop getting labs

## 2022-05-13 ENCOUNTER — TELEPHONE (OUTPATIENT)
Dept: GASTROENTEROLOGY | Facility: CLINIC | Age: 62
End: 2022-05-13
Payer: COMMERCIAL

## 2022-05-24 NOTE — TELEPHONE ENCOUNTER
Called & spoke to pt  - Reviewed MRI Enterography shows stable stricture w/ no upstream dilation & labs show anemia  - Pt followed by Dr. Vik xie/ [facility]  - Will send recent labs to Dr. Chery  - Pt appreciated the call

## 2022-11-10 ENCOUNTER — TELEPHONE (OUTPATIENT)
Dept: GASTROENTEROLOGY | Facility: CLINIC | Age: 62
End: 2022-11-10
Payer: COMMERCIAL

## 2022-11-10 NOTE — TELEPHONE ENCOUNTER
----- Message from Kacey Barksdale, RN sent at 11/9/2022  3:26 PM CST -----  Regarding: FW: IV  Contact: Nehemiah @ [phone]    ----- Message -----  From: Lory Goldman  Sent: 11/9/2022  12:45 PM CST  To: [facility] Staff  Subject: JOHNATHAN Cerna from [place], is calling to discuss pt's present medication:  infliximab-dyyb (INFLECTRA IV), asking for urgent call back

## 2022-11-10 NOTE — TELEPHONE ENCOUNTER
Returned call to [place]  - Patient has lost weight so asking for dose adjustment  - Advised pt receives 10mg/kg every 4 weeks based on her most recent weight  - LD 11/7/22, ND 12/5/22  - Provided fax number for new order to be sent to and signed by the provider reflecting dose based on new weight.   - All questions answered

## 2022-11-16 ENCOUNTER — OFFICE VISIT (OUTPATIENT)
Dept: GASTROENTEROLOGY | Facility: CLINIC | Age: 62
End: 2022-11-16
Payer: COMMERCIAL

## 2022-11-16 DIAGNOSIS — K50.818 CROHN'S DISEASE OF BOTH SMALL AND LARGE INTESTINE WITH OTHER COMPLICATION: ICD-10-CM

## 2022-11-16 DIAGNOSIS — K22.2 ESOPHAGEAL STRICTURE: Primary | ICD-10-CM

## 2022-11-16 PROCEDURE — 99215 OFFICE O/P EST HI 40 MIN: CPT | Mod: 95,,, | Performed by: INTERNAL MEDICINE

## 2022-11-16 PROCEDURE — 99215 PR OFFICE/OUTPT VISIT, EST, LEVL V, 40-54 MIN: ICD-10-PCS | Mod: 95,,, | Performed by: INTERNAL MEDICINE

## 2022-11-16 RX ORDER — PREDNISONE 10 MG/1
TABLET ORAL
COMMUNITY
Start: 2022-10-27 | End: 2024-03-27

## 2022-11-16 RX ORDER — TACROLIMUS 1 MG/G
1 OINTMENT TOPICAL 2 TIMES DAILY
COMMUNITY
Start: 2022-11-04

## 2022-11-16 RX ORDER — TRIAMCINOLONE ACETONIDE 1 MG/G
1 OINTMENT TOPICAL 2 TIMES DAILY
COMMUNITY
Start: 2022-10-27

## 2022-11-16 RX ORDER — SODIUM CHLORIDE 0.9 % (FLUSH) 0.9 %
10 SYRINGE (ML) INJECTION
Status: ON HOLD | COMMUNITY
Start: 2022-10-10 | End: 2022-12-20 | Stop reason: HOSPADM

## 2022-11-16 RX ORDER — HEPARIN 100 UNIT/ML
500 SYRINGE INTRAVENOUS
Status: ON HOLD | COMMUNITY
Start: 2022-10-10 | End: 2022-12-20 | Stop reason: HOSPADM

## 2022-11-16 RX ORDER — FLUCONAZOLE 200 MG/1
TABLET ORAL
Status: ON HOLD | COMMUNITY
Start: 2022-10-27 | End: 2022-12-20 | Stop reason: HOSPADM

## 2022-11-16 NOTE — PROGRESS NOTES
IBD PATIENT INTAKE:    COVID symptoms in the last 7 days (runny nose, sore throat, congestion, cough, fever): No  PCP: Radha Engle   If not PCP-  number given to establish [phone]: N/A    ALLERGIES REVIEWED:  Yes    CHIEF COMPLAINT:    Chief Complaint   Patient presents with    Crohn's Disease       VITAL SIGNS:  There were no vitals taken for this visit.     Change in medical, surgical, family or social history: Yes mother passed last night    IBD THERAPY (name, dose/frequency):  inflectra 10mg/kg q28d   Last dose:  11/07    Next dose:  12/05  Infusion/Pharmacy: [facility]     NSAIDs (aspirin, ibuprofen-advil or motrin, naproxen-aleve, diclofenac-voltaren, BC powder, excedrin, goodies): No    Alternative/Complementary Medications (i.e. probiotics, turmeric, fish oil, aloe vera):      no  Name/dose:  n/a    Vitamins:   Vit D:  5000U     Vit B-12:  1000mcg q30d   Folic Acid: no       Calcium: no     Iron:  no      MVI: no    Antibiotics (past 30 Days):  no  If yes   Indication:  Name of antibiotic:  Completion date:     REVIEWED MEDICATION LIST RECONCILED INCLUDING ABOVE MEDS:  yes

## 2022-11-16 NOTE — PATIENT INSTRUCTIONS
- make sure you get your vaccines- hep A, hep B, tetanus, shingrix   - EGD with dilation asap  - set reminder in 1/2023 to check in with patient to see if she still would like to do colonoscopy/MRE same day 3/2023 then RN can place orders and make sure f/u is after the scope/MRE  - labs at [facility] 12/5/22 before your infusion- CBC, CMP, infliximab drug levels

## 2022-11-21 ENCOUNTER — TELEPHONE (OUTPATIENT)
Dept: ENDOSCOPY | Facility: HOSPITAL | Age: 62
End: 2022-11-21
Payer: COMMERCIAL

## 2022-12-05 ENCOUNTER — TELEPHONE (OUTPATIENT)
Dept: ENDOSCOPY | Facility: HOSPITAL | Age: 62
End: 2022-12-05
Payer: COMMERCIAL

## 2022-12-05 VITALS — WEIGHT: 148 LBS | BODY MASS INDEX: 23.78 KG/M2 | HEIGHT: 66 IN

## 2022-12-05 DIAGNOSIS — K22.2 ESOPHAGEAL STRICTURE: Primary | ICD-10-CM

## 2022-12-06 LAB
ALBUMIN SERPL-MCNC: 3.8 G/DL (ref 3.8–4.8)
ALBUMIN/GLOB SERPL: 1.1 {RATIO} (ref 1.2–2.2)
ALP SERPL-CCNC: 80 IU/L (ref 44–121)
ALT SERPL-CCNC: 23 IU/L (ref 0–32)
AST SERPL-CCNC: 21 IU/L (ref 0–40)
BASOPHILS # BLD AUTO: 0 X10E3/UL (ref 0–0.2)
BASOPHILS NFR BLD AUTO: 0 %
BILIRUB SERPL-MCNC: 0.3 MG/DL (ref 0–1.2)
BUN SERPL-MCNC: 15 MG/DL (ref 8–27)
BUN/CREAT SERPL: 13 (ref 12–28)
CALCIUM SERPL-MCNC: 9.2 MG/DL (ref 8.7–10.3)
CHLORIDE SERPL-SCNC: 108 MMOL/L (ref 96–106)
CO2 SERPL-SCNC: 22 MMOL/L (ref 20–29)
CREAT SERPL-MCNC: 1.12 MG/DL (ref 0.57–1)
EOSINOPHIL # BLD AUTO: 0.2 X10E3/UL (ref 0–0.4)
EOSINOPHIL NFR BLD AUTO: 2 %
ERYTHROCYTE [DISTWIDTH] IN BLOOD BY AUTOMATED COUNT: 13.4 % (ref 11.7–15.4)
EST. GFR  (NO RACE VARIABLE): 55 ML/MIN/1.73
GLOBULIN SER CALC-MCNC: 3.6 G/DL (ref 1.5–4.5)
GLUCOSE SERPL-MCNC: 86 MG/DL (ref 70–99)
HCT VFR BLD AUTO: 32.2 % (ref 34–46.6)
HGB BLD-MCNC: 9.9 G/DL (ref 11.1–15.9)
IMM GRANULOCYTES # BLD AUTO: 0 X10E3/UL (ref 0–0.1)
IMM GRANULOCYTES NFR BLD AUTO: 0 %
LYMPHOCYTES # BLD AUTO: 4.9 X10E3/UL (ref 0.7–3.1)
LYMPHOCYTES NFR BLD AUTO: 52 %
MCH RBC QN AUTO: 27.5 PG (ref 26.6–33)
MCHC RBC AUTO-ENTMCNC: 30.7 G/DL (ref 31.5–35.7)
MCV RBC AUTO: 89 FL (ref 79–97)
MONOCYTES # BLD AUTO: 0.8 X10E3/UL (ref 0.1–0.9)
MONOCYTES NFR BLD AUTO: 8 %
NEUTROPHILS # BLD AUTO: 3.7 X10E3/UL (ref 1.4–7)
NEUTROPHILS NFR BLD AUTO: 38 %
PLATELET # BLD AUTO: 335 X10E3/UL (ref 150–450)
POTASSIUM SERPL-SCNC: 4.5 MMOL/L (ref 3.5–5.2)
PROT SERPL-MCNC: 7.4 G/DL (ref 6–8.5)
RBC # BLD AUTO: 3.6 X10E6/UL (ref 3.77–5.28)
SODIUM SERPL-SCNC: 140 MMOL/L (ref 134–144)
WBC # BLD AUTO: 9.6 X10E3/UL (ref 3.4–10.8)

## 2022-12-20 ENCOUNTER — ANESTHESIA (OUTPATIENT)
Dept: ENDOSCOPY | Facility: HOSPITAL | Age: 62
End: 2022-12-20
Payer: COMMERCIAL

## 2022-12-20 ENCOUNTER — HOSPITAL ENCOUNTER (OUTPATIENT)
Facility: HOSPITAL | Age: 62
Discharge: HOME | End: 2022-12-20
Attending: INTERNAL MEDICINE | Admitting: INTERNAL MEDICINE
Payer: COMMERCIAL

## 2022-12-20 ENCOUNTER — ANESTHESIA EVENT (OUTPATIENT)
Dept: ENDOSCOPY | Facility: HOSPITAL | Age: 62
End: 2022-12-20
Payer: COMMERCIAL

## 2022-12-20 VITALS
SYSTOLIC BLOOD PRESSURE: 173 MMHG | OXYGEN SATURATION: 99 % | BODY MASS INDEX: 23.78 KG/M2 | HEART RATE: 107 BPM | WEIGHT: 148 LBS | DIASTOLIC BLOOD PRESSURE: 82 MMHG | RESPIRATION RATE: 20 BRPM | HEIGHT: 66 IN | TEMPERATURE: 98 F

## 2022-12-20 DIAGNOSIS — K22.2 ESOPHAGEAL STRICTURE: Primary | ICD-10-CM

## 2022-12-20 PROCEDURE — 25000003 PHARM REV CODE 250: Performed by: NURSE ANESTHETIST, CERTIFIED REGISTERED

## 2022-12-20 PROCEDURE — E9220 PRA ENDO ANESTHESIA: HCPCS | Mod: ANES,,, | Performed by: ANESTHESIOLOGY

## 2022-12-20 PROCEDURE — E9220 PRA ENDO ANESTHESIA: ICD-10-PCS | Mod: CRNA,,, | Performed by: NURSE ANESTHETIST, CERTIFIED REGISTERED

## 2022-12-20 PROCEDURE — E9220 PRA ENDO ANESTHESIA: HCPCS | Mod: CRNA,,, | Performed by: NURSE ANESTHETIST, CERTIFIED REGISTERED

## 2022-12-20 PROCEDURE — C1726 CATH, BAL DIL, NON-VASCULAR: HCPCS | Performed by: INTERNAL MEDICINE

## 2022-12-20 PROCEDURE — 43249 PR EGD, FLEX, W/BALL DILATION, < 30MM: ICD-10-PCS | Mod: ,,, | Performed by: INTERNAL MEDICINE

## 2022-12-20 PROCEDURE — 43249 ESOPH EGD DILATION <30 MM: CPT | Performed by: INTERNAL MEDICINE

## 2022-12-20 PROCEDURE — 37000009 HC ANESTHESIA EA ADD 15 MINS: Performed by: INTERNAL MEDICINE

## 2022-12-20 PROCEDURE — 25000003 PHARM REV CODE 250: Performed by: INTERNAL MEDICINE

## 2022-12-20 PROCEDURE — 43249 ESOPH EGD DILATION <30 MM: CPT | Mod: ,,, | Performed by: INTERNAL MEDICINE

## 2022-12-20 PROCEDURE — E9220 PRA ENDO ANESTHESIA: ICD-10-PCS | Mod: ANES,,, | Performed by: ANESTHESIOLOGY

## 2022-12-20 PROCEDURE — 37000008 HC ANESTHESIA 1ST 15 MINUTES: Performed by: INTERNAL MEDICINE

## 2022-12-20 PROCEDURE — 63600175 PHARM REV CODE 636 W HCPCS: Performed by: NURSE ANESTHETIST, CERTIFIED REGISTERED

## 2022-12-20 RX ORDER — LIDOCAINE HYDROCHLORIDE 20 MG/ML
INJECTION INTRAVENOUS
Status: DISCONTINUED | OUTPATIENT
Start: 2022-12-20 | End: 2022-12-20

## 2022-12-20 RX ORDER — SODIUM CHLORIDE 9 MG/ML
INJECTION, SOLUTION INTRAVENOUS CONTINUOUS
Status: DISCONTINUED | OUTPATIENT
Start: 2022-12-20 | End: 2022-12-20 | Stop reason: HOSPADM

## 2022-12-20 RX ORDER — PROPOFOL 10 MG/ML
VIAL (ML) INTRAVENOUS
Status: DISCONTINUED | OUTPATIENT
Start: 2022-12-20 | End: 2022-12-20

## 2022-12-20 RX ORDER — PROPOFOL 10 MG/ML
VIAL (ML) INTRAVENOUS CONTINUOUS PRN
Status: DISCONTINUED | OUTPATIENT
Start: 2022-12-20 | End: 2022-12-20

## 2022-12-20 RX ADMIN — GLYCOPYRROLATE 0.1 MG: 0.2 INJECTION, SOLUTION INTRAMUSCULAR; INTRAVENOUS at 12:12

## 2022-12-20 RX ADMIN — LIDOCAINE HYDROCHLORIDE 100 MG: 20 INJECTION INTRAVENOUS at 12:12

## 2022-12-20 RX ADMIN — Medication 200 MCG/KG/MIN: at 12:12

## 2022-12-20 RX ADMIN — SODIUM CHLORIDE: 0.9 INJECTION, SOLUTION INTRAVENOUS at 12:12

## 2022-12-20 RX ADMIN — PROPOFOL 100 MG: 10 INJECTION, EMULSION INTRAVENOUS at 12:12

## 2022-12-20 NOTE — TRANSFER OF CARE
"Anesthesia Transfer of Care Note    Patient: Aline Fuller    Procedure(s) Performed: Procedure(s) (LRB):  EGD (ESOPHAGOGASTRODUODENOSCOPY) (N/A)    Patient location: PACU    Anesthesia Type: general    Transport from OR: Transported from OR on room air with adequate spontaneous ventilation    Post pain: adequate analgesia    Post assessment: no apparent anesthetic complications    Post vital signs: stable    Level of consciousness: awake    Nausea/Vomiting: no nausea/vomiting    Complications: none    Transfer of care protocol was followed      Last vitals:   Visit Vitals  /65 (BP Location: Left arm, Patient Position: Lying)   Pulse 98   Temp 36.6 °C (97.9 °F) (Temporal)   Resp 18   Ht 5' 6" (1.676 m)   Wt 67.1 kg (148 lb)   SpO2 99%   Breastfeeding No   BMI 23.89 kg/m²     "

## 2022-12-20 NOTE — ANESTHESIA POSTPROCEDURE EVALUATION
Anesthesia Post Evaluation    Patient: Aline Fuller    Procedure(s) Performed: Procedure(s) (LRB):  EGD (ESOPHAGOGASTRODUODENOSCOPY) (N/A)    Final Anesthesia Type: general      Level of consciousness: awake and alert  Post-procedure vital signs: reviewed and stable  Pain control: Pain has been treated.  Airway patency: patent    PONV status: Absent or treated.  Anesthetic complications: no      Cardiovascular status: hemodynamically stable  Respiratory status: unassisted  Hydration status: euvolemic            Vitals Value Taken Time   /76 12/20/22 1255   Temp 36.6 °C (97.9 °F) 12/20/22 1240   Pulse 95 12/20/22 1255   Resp 20 12/20/22 1255   SpO2 99 % 12/20/22 1255         No case tracking events are documented in the log.      Pain/Jil Score: Jil Score: 10 (12/20/2022 12:55 PM)

## 2022-12-20 NOTE — ANESTHESIA PREPROCEDURE EVALUATION
12/20/2022  Aline Fuller is a 62 y.o., female.  Past Medical History:   Diagnosis Date    Anemia     Crohn's disease S/P multiple surgery (ileum, colonh/o perianal and buttock fistulas with abscess with severe anal stricture requiring frequent dilation, h/o ileocolonic resections) 01/31/2017    1973 large bowel resection 1979 further colonic resection 12/17/2010: EUA, dilation of rectal stricture and placement of setons Xs 4    Esophageal stricture     History of Breast cancer dxed 4/2014 4/2014 diagnosed, Lumpectomy 5/2014, S/P Chemo/XRT completed Nov/Dec 2014, on tamoxifen    Immunosuppressed status     Vitamin B12 deficiency     Vitamin D deficiency 01/31/2017     Past Surgical History:   Procedure Laterality Date    APPENDECTOMY      BIOPSY OF ANUS  4/10/2019    Procedure: BIOPSY, ANUS;  Surgeon: Rhett Aparicio MD;  Location: [facility] OR [address] [place] [address];  Service: Colon and Rectal;;    BREAST LUMPECTOMY Right     CHOLECYSTECTOMY      colon resections      COLON SURGERY  1972 or 1973, 1979    COLONOSCOPY      COLONOSCOPY N/A 10/24/2016    Procedure: COLONOSCOPY;  Surgeon: Jamie Polo MD;  Location: [facility] ([address] [place] [address]);  Service: Endoscopy;  Laterality: N/A;    COLONOSCOPY N/A 8/29/2017    Procedure: COLONOSCOPY;  Surgeon: Diamond Barbour MD;  [facility]  Service: Endoscopy;  Laterality: N/A;  schedule as 45 minute case    COLONOSCOPY N/A 10/10/2018    Procedure: COLONOSCOPY;  Surgeon: Diamond Barbour MD;  [facility]  Service: Endoscopy;  Laterality: N/A;    COLONOSCOPY N/A 12/19/2019    Procedure: COLONOSCOPY;  Surgeon: Diamond Barbour MD;  [facility]  Service: Endoscopy;  Laterality: N/A;    COLONOSCOPY N/A 12/14/2020    Procedure: COLONOSCOPY;  Surgeon: Jamie Polo MD;  Location: [facility] ([address] [place] [address]);  Service:  Endoscopy;  Laterality: N/A;    COLONOSCOPY N/A 3/17/2022    Procedure: Colonoscopy;  Surgeon: Jamie Polo MD;  Location: [facility] (4TH FLR);  Service: Endoscopy;  Laterality: N/A;  Pt. is Fully Vaccinated. 3/16 spoke with pt informed of new arrival time of 12pm-pt verbalized undersanding-tb    ESOPHAGOGASTRODUODENOSCOPY      ESOPHAGOGASTRODUODENOSCOPY N/A 10/10/2018    Procedure: EGD (ESOPHAGOGASTRODUODENOSCOPY);  Surgeon: Diamond Barbour MD;  [facility]  Service: Endoscopy;  Laterality: N/A;    ESOPHAGOGASTRODUODENOSCOPY N/A 12/19/2019    Procedure: EGD (ESOPHAGOGASTRODUODENOSCOPY);  Surgeon: Diamond Barbour MD;  [facility]  Service: Endoscopy;  Laterality: N/A;    ESOPHAGOGASTRODUODENOSCOPY N/A 1/29/2020    Procedure: ESOPHAGOGASTRODUODENOSCOPY (EGD);  Surgeon: Jamie Polo MD;  [facility]  Service: Endoscopy;  Laterality: N/A;  with Dr. Polo last week of January or early February per Dr. Trevino-BB    ESOPHAGOGASTRODUODENOSCOPY N/A 12/14/2020    Procedure: EGD (ESOPHAGOGASTRODUODENOSCOPY);  Surgeon: Jamie Polo MD;  [facility]  Service: Endoscopy;  Laterality: N/A;  rapid-3 hours from any testing site-pt requested this time-tb    ESOPHAGOGASTRODUODENOSCOPY N/A 3/17/2022    Procedure: ESOPHAGOGASTRODUODENOSCOPY (EGD);  Surgeon: Jamie Polo MD;  [facility]  Service: Endoscopy;  Laterality: N/A;  schedule for 60 minute case  Pt requesting Dr. Barbour or Dr. Polo only    EXAMINATION UNDER ANESTHESIA Left 4/10/2019    Procedure: Exam under anesthesia botox;  Surgeon: Rhett Aparicio MD;  Location: [facility] OR 2ND FLR;  Service: Colon and Rectal;  Laterality: Left;  S/P- Medi port placed to upper left chest    fistula repairs      KNEE SURGERY  left    SMALL INTESTINE SURGERY      1997    UPPER GASTROINTESTINAL ENDOSCOPY           Pre-op Assessment    I have reviewed the Patient Summary  Reports.     I have reviewed the Nursing Notes. I have reviewed the NPO Status.   I have reviewed the Medications.     Review of Systems  Anesthesia Hx:  No problems with previous Anesthesia    Social:  Non-Smoker, No Alcohol Use        Physical Exam  General: Well nourished, Cooperative, Alert and Oriented    Airway:  Mallampati: II / II  Mouth Opening: Normal  TM Distance: Normal  Tongue: Normal  Neck ROM: Normal ROM    Dental:  Intact    Chest/Lungs:  Clear to auscultation, Normal Respiratory Rate    Heart:  Rate: Normal  Rhythm: Regular Rhythm        Anesthesia Plan  Type of Anesthesia, risks & benefits discussed:    Anesthesia Type: Gen Natural Airway  Intra-op Monitoring Plan: Standard ASA Monitors  Post Op Pain Control Plan: multimodal analgesia  Induction:  IV  Informed Consent: Informed consent signed with the Patient and all parties understand the risks and agree with anesthesia plan.  All questions answered.   ASA Score: 1  Day of Surgery Review of History & Physical: H&P Update referred to the surgeon/provider.    Ready For Surgery From Anesthesia Perspective.     .

## 2022-12-20 NOTE — [employer]OVATION PATIENT INSTRUCTIONS
Discharge Summary/Instructions after an Endoscopic Procedure  Patient Name: Aline Fuller  Patient MRN: [id number]  Patient YOB: 1960 Tuesday, December 20, 2022  Jamie Polo MD  Dear patient,  As a result of recent federal legislation (The Federal Cures Act), you may   receive lab or pathology results from your procedure in your [facility]   account before your physician is able to contact you. Your physician or   their representative will relay the results to you with their   recommendations at their soonest availability.  Thank you,  RESTRICTIONS:  During your procedure today, you received medications for sedation.  These   medications may affect your judgment, balance and coordination.  Therefore,   for 24 hours, you have the following restrictions:   - DO NOT drive a car, operate machinery, make legal/financial decisions,   sign important papers or drink alcohol.    ACTIVITY:  Today: no heavy lifting, straining or running due to procedural   sedation/anesthesia.  The following day: return to full activity including work.  DIET:  Eat and drink normally unless instructed otherwise.     TREATMENT FOR COMMON SIDE EFFECTS:  - Mild abdominal pain, nausea, belching, bloating or excessive gas:  rest,   eat lightly and use a heating pad.  - Sore Throat: treat with throat lozenges and/or gargle with warm salt   water.  - Because air was used during the procedure, expelling large amounts of air   from your rectum or belching is normal.  - If a bowel prep was taken, you may not have a bowel movement for 1-3 days.    This is normal.  SYMPTOMS TO WATCH FOR AND REPORT TO YOUR PHYSICIAN:  1. Abdominal pain or bloating, other than gas cramps.  2. Chest pain.  3. Back pain.  4. Signs of infection such as: chills or fever occurring within 24 hours   after the procedure.  5. Rectal bleeding, which would show as bright red, maroon, or black stools.   (A tablespoon of blood from the rectum is not serious, especially  if   hemorrhoids are present.)  6. Vomiting.  7. Weakness or dizziness.  GO DIRECTLY TO THE NEAREST EMERGENCY ROOM IF YOU HAVE ANY OF THE FOLLOWING:      Difficulty breathing              Chills and/or fever over 101 F   Persistent vomiting and/or vomiting blood   Severe abdominal pain   Severe chest pain   Black, tarry stools   Bleeding- more than one tablespoon   Any other symptom or condition that you feel may need urgent attention  Your doctor recommends these additional instructions:  If any biopsies were taken, your doctors clinic will contact you in 1 to 2   weeks with any results.  - Patient has a contact number available for emergencies.  The signs and   symptoms of potential delayed complications were discussed with the   patient.  Return to normal activities tomorrow.  Written discharge   instructions were provided to the patient.   - Discharge patient to home.   - Resume previous diet.   - Continue present medications.   - Repeat upper endoscopy PRN for retreatment.   For questions, problems or results please call your physician - Jamie Polo MD at Work:  [phone].  [facility], EMERGENCY ROOM PHONE NUMBER: [phone]  IF A COMPLICATION OR EMERGENCY SITUATION ARISES AND YOU ARE UNABLE TO REACH   YOUR PHYSICIAN - GO DIRECTLY TO THE EMERGENCY ROOM.  Jamie Polo MD  12/20/2022 12:42:52 PM  This report has been verified and signed electronically.  Dear patient,  As a result of recent federal legislation (The Federal Cures Act), you may   receive lab or pathology results from your procedure in your [facility]   account before your physician is able to contact you. Your physician or   their representative will relay the results to you with their   recommendations at their soonest availability.  Thank you,  PROVATION

## 2022-12-20 NOTE — H&P
Short Stay Endoscopy History and Physical    PCP - Primary Doctor No    Procedure - EGD  Sedation: GA  ASA - per anesthesia  Mallampati - per anesthesia  History of Anesthesia problems - no  Family history Anesthesia problems -  no     HPI:  This is a 62 y.o. female here for evaluation of : Esophageal stricture    Reflux - no  Dysphagia - yes  Abdominal pain - no  Diarrhea - no    ROS:  Constitutional: No fevers, chills, No weight loss  ENT: No allergies  CV: No chest pain  Pulm: No cough, No shortness of breath  Ophtho: No vision changes  GI: see HPI  Medical History:  has a past medical history of Anemia, Crohn's disease S/P multiple surgery (ileum, colonh/o perianal and buttock fistulas with abscess with severe anal stricture requiring frequent dilation, h/o ileocolonic resections) (01/31/2017), Esophageal stricture, History of Breast cancer (dxed 4/2014), Immunosuppressed status, Vitamin B12 deficiency, and Vitamin D deficiency (01/31/2017).    Surgical History:  has a past surgical history that includes Colonoscopy; Esophagogastroduodenoscopy; colon resections; Appendectomy; Knee surgery (left); Breast lumpectomy (Right); fistula repairs; Colonoscopy (N/A, 10/24/2016); Upper gastrointestinal endoscopy; Cholecystectomy; Small intestine surgery; Colon surgery (1972 or 1973, 1979); Colonoscopy (N/A, 8/29/2017); Esophagogastroduodenoscopy (N/A, 10/10/2018); Colonoscopy (N/A, 10/10/2018); Examination under anesthesia (Left, 4/10/2019); Biopsy of anus (4/10/2019); Esophagogastroduodenoscopy (N/A, 12/19/2019); Colonoscopy (N/A, 12/19/2019); Esophagogastroduodenoscopy (N/A, 1/29/2020); Esophagogastroduodenoscopy (N/A, 12/14/2020); Colonoscopy (N/A, 12/14/2020); Esophagogastroduodenoscopy (N/A, 3/17/2022); and Colonoscopy (N/A, 3/17/2022).    Family History: family history includes Anesthesia problems in her sister; Breast cancer in her sister; Cancer (age of onset: 60) in her mother; Colon cancer (age of onset: 60) in  her mother; Heart attack in her father; Heart disease in her brother and paternal uncle; Ovarian cancer in her maternal aunt; Pancreatic cancer in her maternal grandmother; Stomach cancer (age of onset: 68) in her maternal uncle.. Otherwise no colon cancer, inflammatory bowel disease, or GI malignancies.    Social History:  reports that she has never smoked. She has never used smokeless tobacco. She reports that she does not drink alcohol and does not use drugs.    Review of patient's allergies indicates:   Allergen Reactions    Amlodipine Rash    Sulfa (sulfonamide antibiotics) Hives     Other reaction(s): Rash       Medications:   Medications Prior to Admission   Medication Sig Dispense Refill Last Dose    cholecalciferol, vitamin D3, 125 mcg (5,000 unit) capsule Take by mouth once daily. Takes 2 tablets daily   Past Week    cyanocobalamin 1,000 mcg/mL injection 1,000 mcg every 30 days.   1 Past Week    epoetin mario-epbx (RETACRIT INJ) every 14 (fourteen) days.   Past Month    fluconazole (DIFLUCAN) 200 MG Tab Take by mouth.   12/19/2022    fluticasone propionate (FLONASE) 50 mcg/actuation nasal spray 1 spray by Each Nostril route once daily.   12/19/2022    heparin, porcine, PF, (HEPARIN FLUSH 100 UNITS/ML) 100 unit/mL Syrg Inject 500 Units as directed.   Past Week    infliximab-dyyb (INFLECTRA IV) Inject 10 mg/kg into the vein every 28 days.   Past Week    losartan (COZAAR) 50 MG tablet Take 50 mg by mouth once daily.   12/19/2022    predniSONE (DELTASONE) 10 MG tablet Take by mouth.   12/19/2022    tamoxifen (NOLVADEX) 20 MG Tab Take 20 mg by mouth every evening .   12/19/2022    sodium chloride 0.9% (NORMAL SALINE FLUSH) injection Inject 10 mLs as directed.       sodium chloride 0.9% (NORMAL SALINE FLUSH) injection Inject 10 mLs as directed.       tacrolimus (PROTOPIC) 0.1 % ointment Apply 1 application topically 2 (two) times daily.       triamcinolone acetonide 0.1% (KENALOG) 0.1 % ointment Apply 1  application topically 2 (two) times daily.          Objective Findings:    Vital Signs: Per nursing notes.    Physical Exam:  General Appearance: Well appearing in no acute distress  Head:   Normocephalic, without obvious abnormality  Eyes:    No scleral icterus  Airway: Open  Neck: No restriction in mobility  Lungs: CTA bilaterally in anterior and posterior fields, no wheezes, no crackles.  Heart:  Regular rate and rhythm, S1, S2 normal, no murmurs heard  Abdomen: Soft, non tender, non distended      Labs:  Lab Results   Component Value Date    WBC 9.6 12/05/2022    HGB 9.9 (L) 12/05/2022    HCT 32.2 (L) 12/05/2022     12/05/2022    ALT 23 12/05/2022    AST 21 12/05/2022     12/05/2022    K 4.5 12/05/2022     (H) 12/05/2022    CREATININE 1.12 (H) 12/05/2022    BUN 15 12/05/2022    CO2 22 12/05/2022    TSH 3.844 05/03/2021         I have explained the risks and benefits of endoscopy procedures to the patient including but not limited to bleeding, perforation, infection, and death.    Thank you so much for allowing me to participate in the care of Aline Polo MD

## 2022-12-20 NOTE — PLAN OF CARE
Patient ready for discharge. Discharge instructions reviewed and patient verbalizes understanding.

## 2023-01-03 ENCOUNTER — TELEPHONE (OUTPATIENT)
Dept: GASTROENTEROLOGY | Facility: CLINIC | Age: 63
End: 2023-01-03
Payer: COMMERCIAL

## 2023-01-03 NOTE — TELEPHONE ENCOUNTER
----- Message from Israel Carver MA sent at 11/16/2022 11:15 AM CST -----   reminder in 1/2023 to check in with patient to see if she still would like to do colonoscopy/MRE same day 3/2023 then RN can place orders and make sure f/u is after the scope/MRE

## 2023-01-05 DIAGNOSIS — K50.818 CROHN'S DISEASE OF BOTH SMALL AND LARGE INTESTINE WITH OTHER COMPLICATION: Primary | ICD-10-CM

## 2023-01-18 ENCOUNTER — TELEPHONE (OUTPATIENT)
Dept: GASTROENTEROLOGY | Facility: CLINIC | Age: 63
End: 2023-01-18
Payer: COMMERCIAL

## 2023-04-20 ENCOUNTER — TELEPHONE (OUTPATIENT)
Dept: GASTROENTEROLOGY | Facility: CLINIC | Age: 63
End: 2023-04-20
Payer: COMMERCIAL

## 2023-04-20 NOTE — TELEPHONE ENCOUNTER
Called & spoke to Mele VARGAS RN w/ [employer]  - MRI [id number] & [id number] approved for [facility] from 4/17/23-10/14/23  - Auth# [id number]   - Will update pre-cert & the pt

## 2023-04-20 NOTE — TELEPHONE ENCOUNTER
Called & spoke to pt  - Reviewed denial for MRI Enterography scheduled for 4/24/23  - Plan for peer to peer to overturn decision  - All questions answered

## 2023-04-20 NOTE — TELEPHONE ENCOUNTER
----- Message from Kirt PILY Route sent at 4/19/2023  4:15 PM CDT -----  Regarding: Resubmit MRI Order  Contact: [phone]  Pt. Is calling in ref to scheduled MRI. MRI is scheduled on 4/24. Pt. States the insurance company called saying they won't cover it because of the way that it is billed. Pt states she was told it's being billed as if inpatient and order needs to be resubmitted not billed through hospital. Patient Requesting Call Back @ pt.[phone]

## 2023-04-24 ENCOUNTER — OFFICE VISIT (OUTPATIENT)
Dept: GASTROENTEROLOGY | Facility: CLINIC | Age: 63
End: 2023-04-24
Attending: INTERNAL MEDICINE
Payer: COMMERCIAL

## 2023-04-24 ENCOUNTER — HOSPITAL ENCOUNTER (OUTPATIENT)
Dept: RADIOLOGY | Facility: HOSPITAL | Age: 63
Discharge: HOME | End: 2023-04-24
Attending: INTERNAL MEDICINE
Payer: COMMERCIAL

## 2023-04-24 VITALS
BODY MASS INDEX: 24.91 KG/M2 | OXYGEN SATURATION: 100 % | HEART RATE: 100 BPM | TEMPERATURE: 98 F | WEIGHT: 154.31 LBS | SYSTOLIC BLOOD PRESSURE: 140 MMHG | DIASTOLIC BLOOD PRESSURE: 69 MMHG

## 2023-04-24 DIAGNOSIS — K50.818 CROHN'S DISEASE OF BOTH SMALL AND LARGE INTESTINE WITH OTHER COMPLICATION: ICD-10-CM

## 2023-04-24 DIAGNOSIS — K50.818 CROHN'S DISEASE OF BOTH SMALL AND LARGE INTESTINE WITH OTHER COMPLICATION: Primary | ICD-10-CM

## 2023-04-24 DIAGNOSIS — K50.80 CROHN'S DISEASE OF BOTH SMALL AND LARGE INTESTINE WITHOUT COMPLICATION: ICD-10-CM

## 2023-04-24 PROCEDURE — 3078F DIAST BP <80 MM HG: CPT | Mod: CPTII,S$GLB,, | Performed by: INTERNAL MEDICINE

## 2023-04-24 PROCEDURE — 3008F BODY MASS INDEX DOCD: CPT | Mod: CPTII,S$GLB,, | Performed by: INTERNAL MEDICINE

## 2023-04-24 PROCEDURE — 3078F PR MOST RECENT DIASTOLIC BLOOD PRESSURE < 80 MM HG: ICD-10-PCS | Mod: CPTII,S$GLB,, | Performed by: INTERNAL MEDICINE

## 2023-04-24 PROCEDURE — 72197 MRI ENTEROGRAPHY (XPD): ICD-10-PCS | Mod: 26,,, | Performed by: RADIOLOGY

## 2023-04-24 PROCEDURE — 99215 OFFICE O/P EST HI 40 MIN: CPT | Mod: S$GLB,,, | Performed by: INTERNAL MEDICINE

## 2023-04-24 PROCEDURE — A9585 GADOBUTROL INJECTION: HCPCS | Performed by: INTERNAL MEDICINE

## 2023-04-24 PROCEDURE — 25500020 PHARM REV CODE 255: Performed by: INTERNAL MEDICINE

## 2023-04-24 PROCEDURE — 1159F PR MEDICATION LIST DOCUMENTED IN MEDICAL RECORD: ICD-10-PCS | Mod: CPTII,S$GLB,, | Performed by: INTERNAL MEDICINE

## 2023-04-24 PROCEDURE — 3008F PR BODY MASS INDEX (BMI) DOCUMENTED: ICD-10-PCS | Mod: CPTII,S$GLB,, | Performed by: INTERNAL MEDICINE

## 2023-04-24 PROCEDURE — 1160F RVW MEDS BY RX/DR IN RCRD: CPT | Mod: CPTII,S$GLB,, | Performed by: INTERNAL MEDICINE

## 2023-04-24 PROCEDURE — 72197 MRI PELVIS W/O & W/DYE: CPT | Mod: TC

## 2023-04-24 PROCEDURE — 3077F SYST BP >= 140 MM HG: CPT | Mod: CPTII,S$GLB,, | Performed by: INTERNAL MEDICINE

## 2023-04-24 PROCEDURE — 3077F PR MOST RECENT SYSTOLIC BLOOD PRESSURE >= 140 MM HG: ICD-10-PCS | Mod: CPTII,S$GLB,, | Performed by: INTERNAL MEDICINE

## 2023-04-24 PROCEDURE — 74183 MRI ENTEROGRAPHY (XPD): ICD-10-PCS | Mod: 26,,, | Performed by: RADIOLOGY

## 2023-04-24 PROCEDURE — 4010F ACE/ARB THERAPY RXD/TAKEN: CPT | Mod: CPTII,S$GLB,, | Performed by: INTERNAL MEDICINE

## 2023-04-24 PROCEDURE — 4010F PR ACE/ARB THEARPY RXD/TAKEN: ICD-10-PCS | Mod: CPTII,S$GLB,, | Performed by: INTERNAL MEDICINE

## 2023-04-24 PROCEDURE — 74183 MRI ABD W/O CNTR FLWD CNTR: CPT | Mod: 26,,, | Performed by: RADIOLOGY

## 2023-04-24 PROCEDURE — 99215 PR OFFICE/OUTPT VISIT, EST, LEVL V, 40-54 MIN: ICD-10-PCS | Mod: S$GLB,,, | Performed by: INTERNAL MEDICINE

## 2023-04-24 PROCEDURE — 72197 MRI PELVIS W/O & W/DYE: CPT | Mod: 26,,, | Performed by: RADIOLOGY

## 2023-04-24 PROCEDURE — 1159F MED LIST DOCD IN RCRD: CPT | Mod: CPTII,S$GLB,, | Performed by: INTERNAL MEDICINE

## 2023-04-24 PROCEDURE — 1160F PR REVIEW ALL MEDS BY PRESCRIBER/CLIN PHARMACIST DOCUMENTED: ICD-10-PCS | Mod: CPTII,S$GLB,, | Performed by: INTERNAL MEDICINE

## 2023-04-24 RX ORDER — DEXBROMPHENIRAMINE MALEATE, DEXTROMETHORPHAN HBR, PHENYLEPHRINE HCL 2; 15; 7.5 MG/5ML; MG/5ML; MG/5ML
LIQUID ORAL
COMMUNITY
Start: 2023-02-19 | End: 2024-03-27

## 2023-04-24 RX ORDER — GADOBUTROL 604.72 MG/ML
10 INJECTION INTRAVENOUS
Status: COMPLETED | OUTPATIENT
Start: 2023-04-24 | End: 2023-04-24

## 2023-04-24 RX ADMIN — GADOBUTROL 10 ML: 604.72 INJECTION INTRAVENOUS at 12:04

## 2023-04-24 NOTE — PROGRESS NOTES
IBD PATIENT INTAKE:    COVID symptoms in the last 7 days (runny nose, sore throat, congestion, cough, fever): No  PCP: Radha Engle  If not PCP-  number given to establish [phone]: N/A    ALLERGIES REVIEWED:  Yes    CHIEF COMPLAINT:    Chief Complaint   Patient presents with    Crohn's Disease       VITAL SIGNS:  BP (!) 140/69 (BP Location: Left arm, Patient Position: Sitting)   Pulse 100   Temp 98.2 °F (36.8 °C)   Wt 70 kg (154 lb 5.2 oz)   SpO2 100%   BMI 24.91 kg/m²      Change in medical, surgical, family or social history: No    IBD THERAPY (name, dose/frequency):  Inflectra 10mg/kg q28d   Tacrolimus 0.1 %  Last dose:  3/27    Next dose:  4/25  Infusion/Pharmacy: Other cor    NSAIDs (aspirin, ibuprofen-advil or motrin, naproxen-aleve, diclofenac-voltaren, BC powder, excedrin, goodies): No    Alternative/Complementary Medications (i.e. probiotics, turmeric, fish oil, aloe vera):      no  Name/dose:  no    Vitamins:   Vit D:  [id number]      Vit B-12:  1000mcg q30 injection   Folic Acid: no       Calcium: no     Iron:  no      MVI: no    Antibiotics (past 30 Days):  no  If yes   Indication:  Name of antibiotic:  Completion date:     REVIEWED MEDICATION LIST RECONCILED INCLUDING ABOVE MEDS:  yes

## 2023-04-24 NOTE — PATIENT INSTRUCTIONS
- labs today  - colonoscopy June 2023   - give pt orders for shingrix and hepatitis A and get this 2 doses 6 mos apart   - get results from infliximab levels/abs Jan 2023  - schedule dietician visit with next visit

## 2023-04-24 NOTE — PROGRESS NOTES
[facility]             Inflammatory Bowel Disease   Follow-up  Note              TODAY'S VISIT DATE:  4/24/2023    Chief Complaint:   Chief Complaint   Patient presents with    Crohn's Disease     PCP: Primary Doctor No    Previous History:  Aline Fuller is a 62 y.o.  female with Crohn's disease S/P surgery with anal stricture and anastomotic/ileum stricture (h/o perianal and buttock fistulas with abscess with severe anal stricture dilation, S/P multiple surgeries including partial colectomy in 1973, 1979 and 1997),  history of breast cancer (dxed 4/2014, lumpectomy 5/2014, chemo/XRT and then tamoxifen), GERD with esophageal stricture.  She initially started with symptoms of abdominal pain, weight loss, and diarrhea in 1972 and had an ex lap in 1973 with 6 inches of colon removed that was non-diagnostic for CD.  She was not diagnosed until 1979 after her second ex lap with 6 inches of small bowel removed.  She was initially treated with several courses of prednisone.  Early in her diagnosis, she took sulfasalazine though it was discontinued due to a rash, dipentum though it was not effective long term, and imuran though it had to be discontinued due to bone marrow suppression.  Her symptoms progressed which resulted in an SBO that prompted her third ex lap in 1997 that resulted in 6 inches of small bowel being ressected.  She was in clinic remission and on no medications from 1997 to 2001 at which time she recalls having a fistula and and abscess that required I&D and ABX.  She had recurrence of fistula and abscess in 2004 or 2005.  She also took asacol, colazal, 6MP, and imuran that were all ineffective though possible anemia with 6MP/Imuran.  In 2008 she started humira with induction and maintenance but discontinued after 3 months because patient broke out with a rash though she also felt it was ineffective.   She established care with Dr. Polo in 2010 and had a colonoscopy 10/2010  which showed fistulas, anal stricture dilated, and active inflammation at the ileocolonic anastomosis that could not be traversed.  In 12/2010 she had perianal Crohn's disease that was treated with dilation of rectal stricture and seton placement.  She started remicade 2/2011.  MRI 4/2011 showed multiple perianal fistulas with seton removal in approximately 5/2011.  At some time in 2012, her remicade dose frequency was increased to 5 mg/kg to every 6 weeks.  She was diagnosed with stage 1 breast cancer 4/2014  that was treated with lumpectomy 5/2014 followed by chemo/XRT with tamoxifen. Since 2012 she continues on remicade 5 mg/kg every 6 weeks though it was held during chemotherapy from 6/2014-4/2015 and restarted with induction and maintenance of 5 mg/kg every 6 weeks.   Colonoscopy 10/2016 showed continued anal stricture with inflammation throughout the colon and neoterminal ileum could not be intubated.  In 2/2017 Garcia trough IFX levels/abs were undetectable with no ABs.  In spring 2017 remicade was optimized to 10 mg/kg every 6 weeks. CTE 2/27/17 showed active CD involving the descending and sigmoid colon and rectum.  Decreased caliber at the ileocolic anastomosis with possible mucosal hyperenhancement.  Note is made of focal decreased caliber within the rectum, possibly transient.  Multiple perianal fistulas without evidence for abscess; heterogeneous enhancement within the uterus.  Appearance is not certainly secondary to leiomyomas.  Given postmenopausal age group, recommend further evaluation with pelvic ultrasound to assess endometrium.   She was last seen in our clinic 8/2017 at which time we recommended yearly SB imaging with MRE and anal stricture dilation of constipation returns by Dr. Mcclure with EUA though o/w plans to dilate at time of colonoscopy yearly.  MRE 6/25/18 showed some suspected luminal narrowing to approximately 5 mm at level of the ileocolic anastomosis RIGHT lower quadrant without  evidence for upstream dilatation of bowel questionable minimal hyper enhancement without evidence for mesenteric edema. In 12/2019 she had EGD and colonoscopy with EGD showing diffuse esohageal candiasis and dilation of esophageal stricture though not fully dilated due to risk of perforation with bleeding at time of dilation initially. In 4/2019 she had EUA with Dr. Aparicio with anal biopsies showing no dysplasia.   In 12/2019 patient had colonoscopy which showed buttock fistula opening that is closed and scarring and hypopigmentation of the skin. Severe anal stenosis and large perianal skin tags. Stricture dilated by KARON. The remainder of the colon was normal though scope aborted in transverse colon due to significant fixed looping. After her scopes 12/2019 pt was upset b/c she did not recall plan of care outlined after procedures so I called and explained that with this tight anal stricture we feel surgery with colostomy was ideal given risk of cancer and obstruction though she did not wish to make a f/u appt with CRS at that time. Due to esophageal candidiasis and immunosuppression she was given a 3 week course of diflucan.  At that time she continued follow up with our recommendations and repeat EGD with Dr. Polo for esophageal stricture dilation after candidiasis treatment. Repeat EGD 1/29/19 showed a severe esophageal stricture of lower third of the esophagus with dilator passed through the scope and on reinsertion moderate mucosal disruption with normal stomach and duodenum. On 12/14/20 patient had dilation of esophageal stricture again up to 12 mm with normal stomach and duodenum. Colonoscopy 12/2020 showed perianal skin tags with severe anal stenosis, prior end to side ileocolonic anastomosis of the transverse colon which could not be traversed with normal colon. Biopsies of transverse/descending/sigmoid normal, biopsies of rectum with focal bifid crypt architectural distortion. Per patient Dr. Aparicio was  present and dilated stricture during the scope with Dr. Polo. She continues on remicade 10 mg/kg every 6 weeks (([id number]--reinitiated 2016 with induction and then every 6 weeks maintenance, 10 mg/kg q 6 weeks spring 2017) and she has 1-3 BMs/day, loose to soft, no blood and has rare incontinence every few weeks. Once every few weeks she has a nocturnal BM. Often her bowel movements depends on what she ate and what time she ate. She has some dysphagia with dry solid (chicken breast, fish) and if she eats small bites and chews things slowly then overall does okay. Every few weeks has no bowel movements with bloating and difficulty evacuating which lasts for 2-3 days and occurs every 2-3 days and then once constipation resolves she then has diarrhea.  She drinks a lot of water and occasionally a fleets enemas. MRE 6/2021 around the area of anastomosis there is tethering and fibrotic bands with multiple tortuous SB loops and luminal narrowing at the anastomosis without significant upstream dilatation, cholecystectomy with mild CBD prominence.  On 1/3/2022 trough infliximab level 15/antibodies negative on inflectra 10 mg/kg q 4 weeks.   EGD/colonoscopy deferred from 12/2021 to 3/2022.  In 3/2022 EGD and colonoscopy (performed by Dr. Polo who was previously taking care of her) which was significant for benign appearing esophageal stenosis at GEJ dilated to 10 mm and colonoscopy 3/2022 showed hypertrophied skin tags, benign appearing anal stenosis traversed after digital dilation, severe ileocolonic anastomotic stricture not traversed with solitary apthous ulcer at the anastamosis with remainder of colon normal. Biopsies of ascending/transverse/descending/sigmoid and rectum normal. In 5/2021 her trough IFX level 3.3 and in 11/2021 her inflectra was optimized to 10 mg/kg q 4 weeks from q 6 weeks followed by repeat trough IFX levels 1/3/22 15/Abs neg. In 10/2022 she had upper abd pain, diarrhea and nocturnal stooling  and imodium with remicade helped. MRE 5/2022 c/w significant distal ileal stricture at the anastomosis extending 5-6 cm with no upstream dilation, left perianal and gluteal soft tissue enhancement likely related to scarring and healing from prior perianal fistulas. She had dysphagia so we referred her for urgent EGD. Patient started with eczema 10/2022 and she required prednisone for short time and then has had good control with tacrolimus ointment and kenalog cream.      Interval History:  - current IBD meds: inflectra 10 mg/kg q 4 weeks (10 mg/kg q 4 weeks from q 6 weeks started 11/2021, LD 3/27, ND 4/25)  - eczema treatment- prednisone prn, continues tacrolimus ointment/kenalog cream and approved for dupixent and has appt with derm and not sure if she will be starting   - 1-2 soft BMs/d, no blood, twice a week has nocturnal BM  - MRE done today  - drinking a lot of water which helps, doesn't take miralax, takes dulcolax twice a last 2-3 mos  -12/20/22  Benign-appearing esophageal stenosis at the GE junction, dilated up to 12 mm  - NSAID use: No  - Narcotic use: No  - Alternative/complementary meds for IBD: No    Previous Surgeries:  1973 large bowel resection  1979 further colonic resection  12/17/2010: EUA, dilation of rectal stricture and placement of setons Xs 4    Last pertinent Endoscopy/Imaging:  3/17/22 EGD (Dr. Jamie Polo): benign appearing esophageal stenosis at GE junction dilated to 10 mm  3/17/22 colonoscopy (Dr. Jamie Polo): hypertrophied skin tags, benign appearing anal stenosis traversed after digital dilation, severe ileocolonic anastomotic stricture not traversed with solitary apthous ulcer at the anastamosis with remainder of colon normal. Biopsies of ascending/transverse/descending/sigmoid and rectum normal  5/2/22 MRE:  Significant luminal narrowing of the distal ileum in the ileocecal anastomosis extending approximately 5-6 cm.  No significant upstream bowel dilatation with small bowel  loops measuring on the order of 1.5-2 cm.Left perianal and gluteal soft tissue enhancement and distortion.  Findings may represent scarring and healed prior perianal fistulas. Stable mild prominence of the central intrahepatic and common bile ducts likely related to cholecystectomy status.  Multiple uterine fibroids.  Left perianal and gluteal soft tissue enhancement and distortion.  No discrete fluid collection or fluid-filled tract identified noting limited assessment on current exam field of view.  Findings may represent scarring and healed prior perianal fistulas  22  Benign-appearing esophageal stenosis at the GE junction, dilated up to 12 mm.    Therapeutic drug monitorin2017 Garcia IFX level <1.0, Ab neg  17 IFX 3.0/Abs neg  10/25/18 IFX 3.0/Abs neg  18 IFX 3.3/Abs neg  21 IFX level 3.3/ABs neg on remicade 10 mg/kg q 6 weeks  1/3/22 trough IFX level 15/Abs neg on inflectra 10 mg/kg q 4 weeks     Prior IBD Therapies:  Prednisone  Sulfasalazine- rash  Dipentum- ineffective  6MP- bone marrow suppression  Flagyl/Cipro- effective   Asacol- ineffective  Colazal- ineffective  Imuran  Humira-rash-  or     Vaccinations:  Lab Results   Component Value Date    HEPBSAB Negative 2017     Lab Results   Component Value Date    HEPBSURFABQU Negative 2021    HEPBSURFABQU <3 2021     Lab Results   Component Value Date    HEPAIGG Negative 2021     Lab Results   Component Value Date    VARICELLAZOS 1.86 (H) 2021    VARICELLAINT Positive (A) 2021     Immunization History   Administered Date(s) Administered    COVID-19, MRNA, LN-S, PF (Pfizer) (Purple Cap) 2021, 2021, 10/31/2021    Hepatitis A / Hepatitis B 2011, 2011, 2011    Influenza - High Dose - PF (65 years and older) 10/28/2019    Influenza - Quadrivalent - PF *Preferred* (6 months and older) 2018, 10/28/2019, 2020, 11/10/2021    Pneumococcal Conjugate - 13 Valent  08/26/2015, 12/12/2016    Pneumococcal Polysaccharide - 23 Valent 05/03/2021    Tdap 04/27/2009     Flu shot: recommended  COVID vaccine/booster: recommended   Tetanus (TdaP): recommended   HPV:    NA  Meningococcal: NA  Hepatitis B: recommended  Hepatitis A:  Recommended   MMR (live vaccine):  immune  Shingrix: recommended     Review of Systems   Constitutional:  Negative for chills, fever and weight loss.   HENT:          No oral ulcers, dysphagia, oral thrush   Eyes:  Negative for blurred vision, pain and redness.   Respiratory:  Negative for cough and shortness of breath.    Cardiovascular:  Negative for chest pain.   Gastrointestinal:  Negative for abdominal pain, heartburn, nausea and vomiting.   Genitourinary:  Negative for dysuria and hematuria.   Musculoskeletal:  Negative for back pain and joint pain.   Skin:  Negative for rash.   Psychiatric/Behavioral:  Negative for depression. The patient is not nervous/anxious and does not have insomnia.       All Medical History/Surgical History/Family History/Social History/Allergies have been reviewed and updated in EMR    Outpatient Medications Marked as Taking for the 4/24/23 encounter (Office Visit) with Diamond Barbour MD   Medication Sig Dispense Refill    cholecalciferol, vitamin D3, 125 mcg (5,000 unit) capsule Take by mouth once daily. Takes 2 tablets daily      cyanocobalamin 1,000 mcg/mL injection 1,000 mcg every 30 days.   1    fluticasone propionate (FLONASE) 50 mcg/actuation nasal spray 1 spray by Each Nostril route once daily.      infliximab-dyyb (INFLECTRA IV) Inject 10 mg/kg into the vein every 28 days.      losartan (COZAAR) 50 MG tablet Take 50 mg by mouth once daily.      tacrolimus (PROTOPIC) 0.1 % ointment Apply 1 application topically 2 (two) times daily.      tamoxifen (NOLVADEX) 20 MG Tab Take 20 mg by mouth every evening .      triamcinolone acetonide 0.1% (KENALOG) 0.1 % ointment Apply 1 application topically 2 (two) times daily.        Vital Signs:  BP (!) 140/69 (BP Location: Left arm, Patient Position: Sitting)   Pulse 100   Temp 98.2 °F (36.8 °C)   Wt 70 kg (154 lb 5.2 oz)   SpO2 100%   BMI 24.91 kg/m²      Physical Exam  Cardiovascular:      Rate and Rhythm: Normal rate and regular rhythm.      Pulses: Normal pulses.      Heart sounds: Normal heart sounds. No murmur heard.  Pulmonary:      Effort: Pulmonary effort is normal. No respiratory distress.      Breath sounds: Normal breath sounds.   Abdominal:      General: Bowel sounds are normal. There is no distension.      Palpations: Abdomen is soft.      Tenderness: There is no abdominal tenderness.        Labs:   Lab Results   Component Value Date    CRP 3.5 05/03/2021     Lab Results   Component Value Date    HEPBSAG Negative 05/02/2022    HEPBCAB Negative 05/02/2022     Lab Results   Component Value Date    TBGOLDPLUS Negative 05/02/2022     Lab Results   Component Value Date    EKVFYCYS82FT 41 05/02/2022    WUFGDHHA74 659 05/02/2022     Lab Results   Component Value Date    WBC 9.6 12/05/2022    HGB 9.9 (L) 12/05/2022    HCT 32.2 (L) 12/05/2022    MCV 89 12/05/2022     12/05/2022     Lab Results   Component Value Date    CREATININE 1.12 (H) 12/05/2022    ALBUMIN 3.8 12/05/2022    BILITOT 0.3 12/05/2022    ALKPHOS 83 05/02/2022    AST 21 12/05/2022    ALT 23 12/05/2022     Assessment/Plan:  Aline Fuller is a 62 y.o. female with Crohn's disease S/P surgery with anal stricture and anastomotic/ileum stricture (h/o perianal and buttock fistulas with abscess with severe anal stricture dilation, S/P multiple surgeries including partial colectomy in 1973, 1979 and 1997),  history of breast cancer (dxed 4/2014, lumpectomy 5/2014, chemo/XRT and then tamoxifen), GERD with esophageal stricture.     She continues on inflectra 10 mg/kg q 4 weeks with overall stable symptoms. Her dysphagia improved with dilation of esophageal stricture 12/2022 and she has some constipation symptoms  though if she drinks enough water usually does well.  She doesn't have any other significant symptoms from her anal stricture and we usually do yearly colonoscopy to dilate stricture and for surveillance CRC biopsies and this was due last month so we will schedule soon.  MRE today done and will f/u on results.     # Crohn's disease S/P surgery with anal stricture and ileocolonic anastomotic stricture (ileum, colon, h/o perianal and buttock fistulas with abscess with severe anal stricture dilation, S/P multiple surgeries including partial colectomy in 1973, 1979 and 1997)  - note:  history of breast cancer (dxed 4/2014, lumpectomy 5/2014, chemo/XRT and then tamoxifen)  - note: eczema- worse since switching from remicade to inflectra, may have to consider switch to brand name if eczema does not improve  - continue inflectra 10 mg/kg q 4 weeks  - anal stricture- dilated 3/2022, some constipation symptoms and drinks plenty of water and prn saline enemas and dulcolax  - anastomotic/SB stricture and tethering- no upstream dilation, unable to evaluate mucosa on scope- f/u MRE results from today  - continue low residue diet and chewing food well   - colonoscopy was due 3/2023- pt will do in next 1-2 mos   - vitamin B12- continue vit B12 1000 mcg IM q 4 weeks- prescribed by hematology, vit B12 today  - drug monitoring labs: CBC/CMP q 6 mos (today), TPMT (1/2017 TPMT 32.6), TB quantiferon (today), Hep B testing (today)  - TDM: trough remicade levels/abs 1/2023 f/u on results from [facility]    # Distal esophageal stricture with GERD  - dysphagia resolved after recent dilation of stricture by Dr. Polo 12/2023  - reminded pt to let us know if any recurrent symptoms     # IBD specific health maintenance:  CRC risk- sx 1972, >1/3 colon, surveillance colonoscopy q 1-2 years  Skin exam yearly- no skin cancer 10/2022, next due 10/2023, outside dermatologist- Dr. Shapley  Osteopenia- postmenopausal, unable to swallow calcium  supplements  Pap smear yearly- normal 3/2023, next due 3/2024  Vitamin D- continue vit D 3 takes two---5000 IU/d=10,000 IU/d  Vaccines: no live vaccines, gave pt orders to get hep A/B and shingrix and have discussed at previous visits as well    Follow up: 6 mos, in person, MD    Total time: 40 min    Diamond Barbour MD  Department of Gastroenterology  Medical Director, Inflammatory Bowel Disease

## 2023-04-25 ENCOUNTER — TELEPHONE (OUTPATIENT)
Dept: ENDOSCOPY | Facility: HOSPITAL | Age: 63
End: 2023-04-25
Payer: COMMERCIAL

## 2023-04-25 NOTE — TELEPHONE ENCOUNTER
"----- Message from Diamond Barbour MD sent at 2023  2:17 PM CDT -----  Procedure: Colonoscopy    Diagnosis: Crohn's disease surveillance, anal stricture     Procedure Timin-4 weeks     #If within 4 weeks selected, please herb as high priority#    #If greater than 12 weeks, please select "5-12 weeks" and delay sending until 2 months prior to requested date#     Provider: Dr. Barbour or Dr. South    Location: [address]    Additional Scheduling Information: No scheduling concerns    Prep Specifications: low residue diet, take dulcolax 5 days before and again with bowel prep- golytely    Have you attached a patient to this message: yes      "

## 2023-06-27 ENCOUNTER — TELEPHONE (OUTPATIENT)
Dept: GASTROENTEROLOGY | Facility: CLINIC | Age: 63
End: 2023-06-27
Payer: COMMERCIAL

## 2023-06-27 NOTE — TELEPHONE ENCOUNTER
Plan of Treatment form signed, dated and faxed to infusion center. Successful fax transmittal e-mail received.     Infusion center: [facility] infusion services  Fax # [phone]

## 2023-09-22 ENCOUNTER — TELEPHONE (OUTPATIENT)
Dept: GASTROENTEROLOGY | Facility: CLINIC | Age: 63
End: 2023-09-22
Payer: COMMERCIAL

## 2023-09-22 NOTE — TELEPHONE ENCOUNTER
-PA approved [facility] via [facility]  -From 09/16/2023 to 09/14/2024

## 2023-10-18 ENCOUNTER — TELEPHONE (OUTPATIENT)
Dept: GASTROENTEROLOGY | Facility: CLINIC | Age: 63
End: 2023-10-18
Payer: COMMERCIAL

## 2023-10-18 NOTE — TELEPHONE ENCOUNTER
Called patient and rescheduled for 1/25/2024 @ 9:30  -says she though appointment was in December and not prepared  -also asked about colonoscopy, per progress note was to do June 2023   -patient requesting to try and get orders and get done before appointment if possible or will wait to spring if she has to  -will let Dr. Barbour know    ----- Message from Jane Campbell sent at 10/18/2023  9:30 AM CDT -----  Needs advice from nurse:      Who Called:pt  Regarding:needs to reschedule Friday's appt  Would the patient rather a call back or VIA [facility]?  Best Call Back number:[phone]  Additional Info:

## 2023-10-20 NOTE — TELEPHONE ENCOUNTER
Called patient   -does have some dysphagia but not bad  -wants to get EGD and colonoscopy done at the same time  -does Golytely prep usually and has worked good in the past  -has appointment end of January with Dr. Barbour   -sent referral for Dr. Polo to do EGD/Colonoscopy December/January preferably and can schedule at [place] or [facility] with Dr. Polo  -patient in agreement with plan  -will follow up to make sure appointment is after procedures

## 2023-12-22 ENCOUNTER — TELEPHONE (OUTPATIENT)
Dept: GASTROENTEROLOGY | Facility: CLINIC | Age: 63
End: 2023-12-22
Payer: COMMERCIAL

## 2023-12-22 NOTE — TELEPHONE ENCOUNTER
Plan of Treatment supporting documents (clinical notes, labs, etc) faxed to infusion center. Successful fax transmittal e-mail received.     [facility]: [facility]  Fax # [phone]

## 2024-01-18 ENCOUNTER — TELEPHONE (OUTPATIENT)
Dept: ENDOSCOPY | Facility: HOSPITAL | Age: 64
End: 2024-01-18
Payer: COMMERCIAL

## 2024-01-18 VITALS — BODY MASS INDEX: 24.75 KG/M2 | WEIGHT: 154 LBS | HEIGHT: 66 IN

## 2024-01-18 DIAGNOSIS — K50.90 CROHN'S DISEASE WITHOUT COMPLICATION, UNSPECIFIED GASTROINTESTINAL TRACT LOCATION: Primary | ICD-10-CM

## 2024-01-18 DIAGNOSIS — K62.4 ANAL STRICTURE: ICD-10-CM

## 2024-01-18 DIAGNOSIS — Z12.11 SPECIAL SCREENING FOR MALIGNANT NEOPLASMS, COLON: ICD-10-CM

## 2024-01-18 RX ORDER — POLYETHYLENE GLYCOL 3350, SODIUM SULFATE ANHYDROUS, SODIUM BICARBONATE, SODIUM CHLORIDE, POTASSIUM CHLORIDE 236; 22.74; 6.74; 5.86; 2.97 G/4L; G/4L; G/4L; G/4L; G/4L
4 POWDER, FOR SOLUTION ORAL ONCE
Qty: 4000 ML | Refills: 0 | Status: SHIPPED | OUTPATIENT
Start: 2024-01-18 | End: 2024-01-18

## 2024-01-18 NOTE — TELEPHONE ENCOUNTER
"    ----- Message from Diamond Barbour MD sent at 2023  2:17 PM CDT -----  Procedure: Colonoscopy     Diagnosis: Crohn's disease surveillance, anal stricture      Procedure Timin-4 weeks      #If within 4 weeks selected, please herb as high priority#     #If greater than 12 weeks, please select "5-12 weeks" and delay sending until 2 months prior to requested date#      Provider: Dr. Barbour or Dr. South     Location: [address]     Additional Scheduling Information: No scheduling concerns     Prep Specifications: low residue diet, take dulcolax 5 days before and again with bowel prep- golytely     Have you attached a patient to this message: yes             "

## 2024-01-18 NOTE — TELEPHONE ENCOUNTER
Received patient's call to schedule for a colonoscopy. Offered to schedule 1/30 and 2/5, patient declined. Patient states she want an afternoon appointment due to the 3 hour drive.

## 2024-01-18 NOTE — TELEPHONE ENCOUNTER
Spoke to patient to schedule procedure(s) Colonoscopy       Physician to perform procedure(s) Dr. ISABELA South  Date of Procedure (s) 3/11/24  Arrival Time 12:15 PM  Time of Procedure(s) 1:15 PM   Location of Procedure(s) [facility] [facility]  Type of Rx Prep sent to patient: PEG  Instructions provided to patient via [facility]    Patient was informed on the following information and verbalized understanding. Screening questionnaire reviewed with patient and complete. If procedure requires anesthesia, a responsible adult needs to be present to accompany the patient home, patient cannot drive after receiving anesthesia. Appointment details are tentative, especially check-in time. Patient will receive a prep-op call 7 days prior to confirm check-in time for procedure. If applicable the patient should contact their pharmacy to verify Rx for procedure prep is ready for pick-up. Patient was advised to call the scheduling department at [phone] if pharmacy states no Rx is available. Patient was advised to call the endoscopy scheduling department if any questions or concerns arise.      [facility] Scheduling Department

## 2024-01-19 ENCOUNTER — PATIENT MESSAGE (OUTPATIENT)
Dept: GASTROENTEROLOGY | Facility: CLINIC | Age: 64
End: 2024-01-19
Payer: COMMERCIAL

## 2024-01-19 DIAGNOSIS — K50.818 CROHN'S DISEASE OF BOTH SMALL AND LARGE INTESTINE WITH OTHER COMPLICATION: ICD-10-CM

## 2024-01-19 DIAGNOSIS — D84.9 IMMUNOSUPPRESSED STATUS: Primary | ICD-10-CM

## 2024-01-22 NOTE — TELEPHONE ENCOUNTER
Per Dr. Barbour:  - EGD/Colonoscopy needed  - may do with Dr. Polo if no availability with Dr. Barbour or Dr. South    Spoke with Aline:  - prefers EGD/Colonoscopy same day  - prefers to add EGD to already scheduled colonoscopy as she has a ride already lined up  - if this cannot be done, she agrees to do with Dr. Polo at [place] if she can be done sooner than with Dr. Barbour or Dr. South  - agrees to go to lab this week, PM sent with orders for [facility] attached

## 2024-01-23 ENCOUNTER — TELEPHONE (OUTPATIENT)
Dept: ENDOSCOPY | Facility: HOSPITAL | Age: 64
End: 2024-01-23
Payer: COMMERCIAL

## 2024-01-23 NOTE — TELEPHONE ENCOUNTER
"----- Message from Trinh Cardoso RN sent at 2024  3:15 PM CST -----  Procedure: EGD/Colonoscopy    Diagnosis: Dysphagia, Crohn's disease    Procedure Timin-4 weeks    #If within 4 weeks selected, please herb as high priority#    #If greater than 12 weeks, please select "5-12 weeks" and delay sending until 2 months prior to requested date#     Provider: Can you add the EGD to already scheduled Colonoscopy on 3/11? If yes, GREAT! If no, ok to schedule with Dr. Polo at [place] - unless Dr. GAUTHIER or Dr. PEREZ can do her sooner than Dr. BALLARD.    Location: [address] or [place] [facility] [place]    Additional Scheduling Information: No scheduling concerns    Prep Specifications:Standard prep; Golnorily    Is the patient taking a GLP-1 Agonist:no    Have you attached a patient to this message: yes      "

## 2024-01-23 NOTE — TELEPHONE ENCOUNTER
Spoke to patient to reschedule procedure(s) Colonoscopy/EGD       Physician to perform procedure(s) Dr. SAMMIE Barbour  Date of Procedure (s) 2/20/24  Arrival Time 9:20 AM  Time of Procedure(s) 10:20 AM   Location of Procedure(s) [place] 4th Floor  Type of Rx Prep sent to patient: PEG  Instructions provided to patient via [facility]    Patient was informed on the following information and verbalized understanding. Screening questionnaire reviewed with patient and complete. If procedure requires anesthesia, a responsible adult needs to be present to accompany the patient home, patient cannot drive after receiving anesthesia. Appointment details are tentative, especially check-in time. Patient will receive a prep-op call 7 days prior to confirm check-in time for procedure. If applicable the patient should contact their pharmacy to verify Rx for procedure prep is ready for pick-up. Patient was advised to call the scheduling department at [phone] if pharmacy states no Rx is available. Patient was advised to call the endoscopy scheduling department if any questions or concerns arise.      [facility] Scheduling Department

## 2024-01-31 ENCOUNTER — TELEPHONE (OUTPATIENT)
Dept: GASTROENTEROLOGY | Facility: CLINIC | Age: 64
End: 2024-01-31
Payer: COMMERCIAL

## 2024-01-31 DIAGNOSIS — K50.818 CROHN'S DISEASE OF BOTH SMALL AND LARGE INTESTINE WITH OTHER COMPLICATION: Primary | ICD-10-CM

## 2024-01-31 NOTE — TELEPHONE ENCOUNTER
----- Message from Diamond Barbour MD sent at 1/30/2024  5:09 PM CST -----  Schedule her for labs before her colonoscopy with Dr. South on 2/20/24.    CBC, CMP, TB gold, HBsAg, HBcAb, vit B12.       Thanks  [facility]  ----- Message -----  From: Trinh Cardoso RN  Sent: 1/26/2024   1:44 PM CST  To: Diamond Barbour MD    Aline was way overdue for labs so we asked her to go to [facility]. I followed up today, did not go. Spoke w her and she's getting CBC eow by local Hem[facility]Onc for anemia. Had labs by PCP in Dec. I went to [facility] and updated, labs all pulled in. Most recent CBC and CMP 12/6/23, not sure why the EOW CBC is not pulling in.  ----- Message -----  From: Trinh Cardoso RN  Sent: 1/26/2024  12:00 AM CST  To: [facility] Staff    [facility] results CBC/CMP

## 2024-02-09 ENCOUNTER — TELEPHONE (OUTPATIENT)
Dept: ENDOSCOPY | Facility: HOSPITAL | Age: 64
End: 2024-02-09
Payer: COMMERCIAL

## 2024-02-19 ENCOUNTER — TELEPHONE (OUTPATIENT)
Dept: GASTROENTEROLOGY | Facility: CLINIC | Age: 64
End: 2024-02-19
Payer: COMMERCIAL

## 2024-02-19 NOTE — TELEPHONE ENCOUNTER
Called patient d/t unread message and she stated that she saw appointment for labs and will get them done

## 2024-02-20 ENCOUNTER — HOSPITAL ENCOUNTER (OUTPATIENT)
Facility: HOSPITAL | Age: 64
Discharge: HOME | End: 2024-02-20
Attending: INTERNAL MEDICINE | Admitting: INTERNAL MEDICINE
Payer: COMMERCIAL

## 2024-02-20 ENCOUNTER — ANESTHESIA EVENT (OUTPATIENT)
Dept: ENDOSCOPY | Facility: HOSPITAL | Age: 64
End: 2024-02-20
Payer: COMMERCIAL

## 2024-02-20 ENCOUNTER — ANESTHESIA (OUTPATIENT)
Dept: ENDOSCOPY | Facility: HOSPITAL | Age: 64
End: 2024-02-20
Payer: COMMERCIAL

## 2024-02-20 VITALS
RESPIRATION RATE: 22 BRPM | HEART RATE: 101 BPM | DIASTOLIC BLOOD PRESSURE: 66 MMHG | OXYGEN SATURATION: 100 % | HEIGHT: 66 IN | BODY MASS INDEX: 26.84 KG/M2 | SYSTOLIC BLOOD PRESSURE: 145 MMHG | WEIGHT: 167 LBS | TEMPERATURE: 98 F

## 2024-02-20 DIAGNOSIS — K50.90 CROHN'S DISEASE: ICD-10-CM

## 2024-02-20 PROCEDURE — 27201012 HC FORCEPS, HOT/COLD, DISP: Performed by: INTERNAL MEDICINE

## 2024-02-20 PROCEDURE — 45380 COLONOSCOPY AND BIOPSY: CPT | Performed by: INTERNAL MEDICINE

## 2024-02-20 PROCEDURE — 63600175 PHARM REV CODE 636 W HCPCS: Performed by: NURSE ANESTHETIST, CERTIFIED REGISTERED

## 2024-02-20 PROCEDURE — 25000003 PHARM REV CODE 250: Performed by: NURSE ANESTHETIST, CERTIFIED REGISTERED

## 2024-02-20 PROCEDURE — 37000009 HC ANESTHESIA EA ADD 15 MINS: Performed by: INTERNAL MEDICINE

## 2024-02-20 PROCEDURE — 43249 ESOPH EGD DILATION <30 MM: CPT | Performed by: INTERNAL MEDICINE

## 2024-02-20 PROCEDURE — 37000008 HC ANESTHESIA 1ST 15 MINUTES: Performed by: INTERNAL MEDICINE

## 2024-02-20 PROCEDURE — 88305 TISSUE EXAM BY PATHOLOGIST: CPT | Mod: 26,,, | Performed by: PATHOLOGY

## 2024-02-20 PROCEDURE — 45380 COLONOSCOPY AND BIOPSY: CPT | Mod: ,,, | Performed by: INTERNAL MEDICINE

## 2024-02-20 PROCEDURE — 43249 ESOPH EGD DILATION <30 MM: CPT | Mod: 51,,, | Performed by: INTERNAL MEDICINE

## 2024-02-20 PROCEDURE — 88305 TISSUE EXAM BY PATHOLOGIST: CPT | Performed by: PATHOLOGY

## 2024-02-20 PROCEDURE — E9220 PRA ENDO ANESTHESIA: HCPCS | Mod: 33,,, | Performed by: NURSE ANESTHETIST, CERTIFIED REGISTERED

## 2024-02-20 PROCEDURE — C1726 CATH, BAL DIL, NON-VASCULAR: HCPCS | Performed by: INTERNAL MEDICINE

## 2024-02-20 RX ORDER — LIDOCAINE HYDROCHLORIDE 20 MG/ML
INJECTION INTRAVENOUS
Status: DISCONTINUED | OUTPATIENT
Start: 2024-02-20 | End: 2024-02-20

## 2024-02-20 RX ORDER — SODIUM CHLORIDE 9 MG/ML
INJECTION, SOLUTION INTRAVENOUS CONTINUOUS
Status: DISCONTINUED | OUTPATIENT
Start: 2024-02-20 | End: 2024-02-20 | Stop reason: HOSPADM

## 2024-02-20 RX ORDER — PHENYLEPHRINE HYDROCHLORIDE 10 MG/ML
INJECTION INTRAVENOUS
Status: DISCONTINUED | OUTPATIENT
Start: 2024-02-20 | End: 2024-02-20

## 2024-02-20 RX ORDER — EPHEDRINE SULFATE 50 MG/ML
INJECTION, SOLUTION INTRAVENOUS
Status: DISCONTINUED | OUTPATIENT
Start: 2024-02-20 | End: 2024-02-20

## 2024-02-20 RX ORDER — PROPOFOL 10 MG/ML
VIAL (ML) INTRAVENOUS
Status: DISCONTINUED | OUTPATIENT
Start: 2024-02-20 | End: 2024-02-20

## 2024-02-20 RX ORDER — FENTANYL CITRATE 50 UG/ML
INJECTION, SOLUTION INTRAMUSCULAR; INTRAVENOUS
Status: DISCONTINUED | OUTPATIENT
Start: 2024-02-20 | End: 2024-02-20

## 2024-02-20 RX ORDER — PANTOPRAZOLE SODIUM 40 MG/1
40 TABLET, DELAYED RELEASE ORAL DAILY
Qty: 30 TABLET | Refills: 11 | Status: SHIPPED | OUTPATIENT
Start: 2024-02-20 | End: 2025-02-19

## 2024-02-20 RX ADMIN — PHENYLEPHRINE HYDROCHLORIDE 100 MCG: 10 INJECTION INTRAVENOUS at 10:02

## 2024-02-20 RX ADMIN — EPHEDRINE SULFATE 15 MG: 50 INJECTION INTRAVENOUS at 10:02

## 2024-02-20 RX ADMIN — EPHEDRINE SULFATE 10 MG: 50 INJECTION INTRAVENOUS at 10:02

## 2024-02-20 RX ADMIN — Medication 100 MG: at 09:02

## 2024-02-20 RX ADMIN — LIDOCAINE HYDROCHLORIDE 100 MG: 20 INJECTION INTRAVENOUS at 09:02

## 2024-02-20 RX ADMIN — SODIUM CHLORIDE: 0.9 INJECTION, SOLUTION INTRAVENOUS at 10:02

## 2024-02-20 RX ADMIN — FENTANYL CITRATE 50 MCG: 50 INJECTION, SOLUTION INTRAMUSCULAR; INTRAVENOUS at 11:02

## 2024-02-20 RX ADMIN — SODIUM CHLORIDE: 0.9 INJECTION, SOLUTION INTRAVENOUS at 09:02

## 2024-02-20 RX ADMIN — Medication 25 MG: at 09:02

## 2024-02-20 NOTE — PROVATION PATIENT INSTRUCTIONS
Discharge Summary/Instructions after an Endoscopic Procedure  Patient Name: Aline Fuller  Patient MRN: [id number]  Patient YOB: 1960 Tuesday, February 20, 2024  Lenard South MD  Dear patient,  As a result of recent federal legislation (The Federal Cures Act), you may   receive lab or pathology results from your procedure in your [facility]   account before your physician is able to contact you. Your physician or   their representative will relay the results to you with their   recommendations at their soonest availability.  Thank you,  RESTRICTIONS:  During your procedure today, you received medications for sedation.  These   medications may affect your judgment, balance and coordination.  Therefore,   for 24 hours, you have the following restrictions:   - DO NOT drive a car, operate machinery, make legal/financial decisions,   sign important papers or drink alcohol.    ACTIVITY:  Today: no heavy lifting, straining or running due to procedural   sedation/anesthesia.  The following day: return to full activity including work.  DIET:  Eat and drink normally unless instructed otherwise.     TREATMENT FOR COMMON SIDE EFFECTS:  - Mild abdominal pain, nausea, belching, bloating or excessive gas:  rest,   eat lightly and use a heating pad.  - Sore Throat: treat with throat lozenges and/or gargle with warm salt   water.  - Because air was used during the procedure, expelling large amounts of air   from your rectum or belching is normal.  - If a bowel prep was taken, you may not have a bowel movement for 1-3 days.    This is normal.  SYMPTOMS TO WATCH FOR AND REPORT TO YOUR PHYSICIAN:  1. Abdominal pain or bloating, other than gas cramps.  2. Chest pain.  3. Back pain.  4. Signs of infection such as: chills or fever occurring within 24 hours   after the procedure.  5. Rectal bleeding, which would show as bright red, maroon, or black stools.   (A tablespoon of blood from the rectum is not serious,  especially if   hemorrhoids are present.)  6. Vomiting.  7. Weakness or dizziness.  GO DIRECTLY TO THE NEAREST EMERGENCY ROOM IF YOU HAVE ANY OF THE FOLLOWING:      Difficulty breathing              Chills and/or fever over 101 F   Persistent vomiting and/or vomiting blood   Severe abdominal pain   Severe chest pain   Black, tarry stools   Bleeding- more than one tablespoon   Any other symptom or condition that you feel may need urgent attention  Your doctor recommends these additional instructions:  If any biopsies were taken, your doctors clinic will contact you in 1 to 2   weeks with any results.  - Discharge patient to home.   - Resume previous diet today.   - Use Protonix (pantoprazole) 40 mg PO daily indefinitely.   - Repeat upper endoscopy in 4 weeks for retreatment.   - Return to referring physician as previously scheduled.  For questions, problems or results please call your physician - Lenard South MD at Work:  [phone].  [facility], EMERGENCY ROOM PHONE NUMBER: [phone]  IF A COMPLICATION OR EMERGENCY SITUATION ARISES AND YOU ARE UNABLE TO REACH   YOUR PHYSICIAN - GO DIRECTLY TO THE EMERGENCY ROOM.  Lenard South MD  2/20/2024 10:06:14 AM  This report has been verified and signed electronically.  Dear patient,  As a result of recent federal legislation (The Federal Cures Act), you may   receive lab or pathology results from your procedure in your [facility]   account before your physician is able to contact you. Your physician or   their representative will relay the results to you with their   recommendations at their soonest availability.  Thank you,  PROVATION

## 2024-02-20 NOTE — PROVATION PATIENT INSTRUCTIONS
Discharge Summary/Instructions after an Endoscopic Procedure  Patient Name: Aline Fuller  Patient MRN: [id number]  Patient YOB: 1960 Tuesday, February 20, 2024  Lenard South MD  Dear patient,  As a result of recent federal legislation (The Federal Cures Act), you may   receive lab or pathology results from your procedure in your [facility]   account before your physician is able to contact you. Your physician or   their representative will relay the results to you with their   recommendations at their soonest availability.  Thank you,  RESTRICTIONS:  During your procedure today, you received medications for sedation.  These   medications may affect your judgment, balance and coordination.  Therefore,   for 24 hours, you have the following restrictions:   - DO NOT drive a car, operate machinery, make legal/financial decisions,   sign important papers or drink alcohol.    ACTIVITY:  Today: no heavy lifting, straining or running due to procedural   sedation/anesthesia.  The following day: return to full activity including work.  DIET:  Eat and drink normally unless instructed otherwise.     TREATMENT FOR COMMON SIDE EFFECTS:  - Mild abdominal pain, nausea, belching, bloating or excessive gas:  rest,   eat lightly and use a heating pad.  - Sore Throat: treat with throat lozenges and/or gargle with warm salt   water.  - Because air was used during the procedure, expelling large amounts of air   from your rectum or belching is normal.  - If a bowel prep was taken, you may not have a bowel movement for 1-3 days.    This is normal.  SYMPTOMS TO WATCH FOR AND REPORT TO YOUR PHYSICIAN:  1. Abdominal pain or bloating, other than gas cramps.  2. Chest pain.  3. Back pain.  4. Signs of infection such as: chills or fever occurring within 24 hours   after the procedure.  5. Rectal bleeding, which would show as bright red, maroon, or black stools.   (A tablespoon of blood from the rectum is not serious,  especially if   hemorrhoids are present.)  6. Vomiting.  7. Weakness or dizziness.  GO DIRECTLY TO THE NEAREST EMERGENCY ROOM IF YOU HAVE ANY OF THE FOLLOWING:      Difficulty breathing              Chills and/or fever over 101 F   Persistent vomiting and/or vomiting blood   Severe abdominal pain   Severe chest pain   Black, tarry stools   Bleeding- more than one tablespoon   Any other symptom or condition that you feel may need urgent attention  Your doctor recommends these additional instructions:  If any biopsies were taken, your doctors clinic will contact you in 1 to 2   weeks with any results.  - Discharge patient to home.   - Resume previous diet today.   - Continue present medications.   - Await pathology results.   - Repeat colonoscopy in 2 years for surveillance.   - Return to referring physician as previously scheduled.   - Patient has a contact number available for emergencies.  The signs and   symptoms of potential delayed complications were discussed with the   patient.  Return to normal activities tomorrow.  Written discharge   instructions were provided to the patient.  For questions, problems or results please call your physician - Lenard South MD at Work:  [phone].  [facility], EMERGENCY ROOM PHONE NUMBER: [phone]  IF A COMPLICATION OR EMERGENCY SITUATION ARISES AND YOU ARE UNABLE TO REACH   YOUR PHYSICIAN - GO DIRECTLY TO THE EMERGENCY ROOM.  Lenard South MD  2/20/2024 11:04:22 AM  This report has been verified and signed electronically.  Dear patient,  As a result of recent federal legislation (The Federal Cures Act), you may   receive lab or pathology results from your procedure in your [facility]   account before your physician is able to contact you. Your physician or   their representative will relay the results to you with their   recommendations at their soonest availability.  Thank you,  PROVATION

## 2024-02-20 NOTE — ANESTHESIA PREPROCEDURE EVALUATION
02/20/2024  Aline Fuller is a 63 y.o., female.  Past Medical History:   Diagnosis Date    Anemia     Crohn's disease S/P multiple surgery (ileum, colonh/o perianal and buttock fistulas with abscess with severe anal stricture requiring frequent dilation, h/o ileocolonic resections) 01/31/2017    1973 large bowel resection 1979 further colonic resection 12/17/2010: EUA, dilation of rectal stricture and placement of setons Xs 4    Esophageal stricture     History of Breast cancer dxed 4/2014 4/2014 diagnosed, Lumpectomy 5/2014, S/P Chemo/XRT completed Nov/Dec 2014, on tamoxifen    Immunosuppressed status     Vitamin B12 deficiency     Vitamin D deficiency 01/31/2017     Past Surgical History:   Procedure Laterality Date    APPENDECTOMY      BIOPSY OF ANUS  4/10/2019    Procedure: BIOPSY, ANUS;  Surgeon: Rhett Aparicio MD;  Location: [facility] OR [address] [place] [address];  Service: Colon and Rectal;;    BREAST LUMPECTOMY Right     CHOLECYSTECTOMY      colon resections      COLON SURGERY  1972 or 1973, 1979    COLONOSCOPY      COLONOSCOPY N/A 10/24/2016    Procedure: COLONOSCOPY;  Surgeon: Jamie Polo MD;  Location: [address]);  Service: Endoscopy;  Laterality: N/A;    COLONOSCOPY N/A 8/29/2017    Procedure: COLONOSCOPY;  Surgeon: Diamond Barbour MD;  Location: [facility] ([address] [place] [address]);  Service: Endoscopy;  Laterality: N/A;  schedule as 45 minute case    COLONOSCOPY N/A 10/10/2018    Procedure: COLONOSCOPY;  Surgeon: Diamond Barbour MD;  [facility]  Service: Endoscopy;  Laterality: N/A;    COLONOSCOPY N/A 12/19/2019    Procedure: COLONOSCOPY;  Surgeon: Diamond Barbour MD;  [facility]  Service: Endoscopy;  Laterality: N/A;    COLONOSCOPY N/A 12/14/2020    Procedure: COLONOSCOPY;  Surgeon: Jamie Polo MD;  Location: [facility] ([address] [place] [address]);  Service: Endoscopy;  Laterality:  N/A;    COLONOSCOPY N/A 3/17/2022    Procedure: Colonoscopy;  Surgeon: Jamie Polo MD;  Location: [facility] (4TH FLR);  Service: Endoscopy;  Laterality: N/A;  Pt. is Fully Vaccinated. 3/16 spoke with pt informed of new arrival time of 12pm-pt verbalized undersanding-tb    ESOPHAGOGASTRODUODENOSCOPY      ESOPHAGOGASTRODUODENOSCOPY N/A 10/10/2018    Procedure: EGD (ESOPHAGOGASTRODUODENOSCOPY);  Surgeon: Diamond Barbour MD;  [facility]  Service: Endoscopy;  Laterality: N/A;    ESOPHAGOGASTRODUODENOSCOPY N/A 12/19/2019    Procedure: EGD (ESOPHAGOGASTRODUODENOSCOPY);  Surgeon: Diamond Barbour MD;  [facility]  Service: Endoscopy;  Laterality: N/A;    ESOPHAGOGASTRODUODENOSCOPY N/A 1/29/2020    Procedure: ESOPHAGOGASTRODUODENOSCOPY (EGD);  Surgeon: Jamie Polo MD;  [facility]  Service: Endoscopy;  Laterality: N/A;  with Dr. Polo last week of January or early February per Dr. Trevino-BB    ESOPHAGOGASTRODUODENOSCOPY N/A 12/14/2020    Procedure: EGD (ESOPHAGOGASTRODUODENOSCOPY);  Surgeon: Jamie Polo MD;  [facility]  Service: Endoscopy;  Laterality: N/A;  rapid-3 hours from any testing site-pt requested this time-tb    ESOPHAGOGASTRODUODENOSCOPY N/A 3/17/2022    Procedure: ESOPHAGOGASTRODUODENOSCOPY (EGD);  Surgeon: Jamie Polo MD;  Location: [facility] (4TH FLR);  Service: Endoscopy;  Laterality: N/A;  schedule for 60 minute case  Pt requesting Dr. Barbour or Dr. Polo only    ESOPHAGOGASTRODUODENOSCOPY N/A 12/20/2022    Procedure: EGD (ESOPHAGOGASTRODUODENOSCOPY);  Surgeon: Jamie Polo MD;  Location: [facility] (4TH FLR);  Service: Endoscopy;  Laterality: N/A;  instr portal -ml  12/13/22-precall completed-  12/19-AWARE OF CHANGE IN PROCEDURE TIME-RT    EXAMINATION UNDER ANESTHESIA Left 4/10/2019    Procedure: Exam under anesthesia botox;  Surgeon: Rhett Aparicio MD;  Location: [facility] OR [address] [place] [address];  Service: Colon and  Rectal;  Laterality: Left;  S/P- Medi port placed to upper left chest    fistula repairs      KNEE SURGERY  left    SMALL INTESTINE SURGERY      1997    UPPER GASTROINTESTINAL ENDOSCOPY       Crohn's disease    Pre-op Assessment    I have reviewed the Patient Summary Reports.     I have reviewed the Nursing Notes. I have reviewed the NPO Status.   I have reviewed the Medications.     Review of Systems  Anesthesia Hx:  No problems with previous Anesthesia             Denies Family Hx of Anesthesia complications.    Denies Personal Hx of Anesthesia complications.                    Hematology/Oncology:  Hematology Normal                                     Cardiovascular:  Cardiovascular Normal                                            Hepatic/GI:  Bowel Prep.      Crohns diseas          Musculoskeletal:  Musculoskeletal Normal                Neurological:  Neurology Normal                                      Dermatological:  Skin Normal        Physical Exam  General: Well nourished    Airway:  Mallampati: II   Mouth Opening: Normal  TM Distance: Normal  Tongue: Normal  Neck ROM: Normal ROM    Dental:  Intact        Anesthesia Plan  Type of Anesthesia, risks & benefits discussed:    Anesthesia Type: Gen Natural Airway  Intra-op Monitoring Plan: Standard ASA Monitors  Informed Consent: Informed consent signed with the Patient and all parties understand the risks and agree with anesthesia plan.  All questions answered.   ASA Score: 2  Day of Surgery Review of History & Physical: H&P Update referred to the surgeon/provider.I have interviewed and examined the patient. I have reviewed the patient's H&P dated: There are no significant changes.     Ready For Surgery From Anesthesia Perspective.     .

## 2024-02-20 NOTE — TRANSFER OF CARE
"Anesthesia Transfer of Care Note    Patient: Aline Fuller    Procedure(s) Performed: Procedure(s) (LRB):  COLONOSCOPY (N/A)  EGD (ESOPHAGOGASTRODUODENOSCOPY) (N/A)    Patient location: GI    Anesthesia Type: general    Transport from OR: Transported from OR on room air with adequate spontaneous ventilation    Post pain: pain needs to be addressed (c/o of buttocks hurting, gi doctor notified and fentenyl titrated in for pain)    Post assessment: no apparent anesthetic complications    Post vital signs: stable    Level of consciousness: awake    Nausea/Vomiting: no nausea/vomiting    Complications: none    Transfer of care protocol was followed      Last vitals: Visit Vitals  /66 (BP Location: Left arm, Patient Position: Lying)   Pulse 92   Temp 36.7 °C (98.1 °F) (Skin)   Resp 16   Ht 5' 6" (1.676 m)   Wt 75.8 kg (167 lb)   SpO2 100%   Breastfeeding No   BMI 26.95 kg/m²     "

## 2024-02-20 NOTE — H&P
Short Stay Endoscopy History and Physical    PCP - Radha Engle MD    Procedure - EGD and colonoscopy  ASA - 2  Mallampati - per anesthesia  History of Anesthesia problems - no  Family history Anesthesia problems -  no     HPI:  This is a 63 y.o. female here for evaluation of :     EGD  Reflux - no  Dysphagia - yes  Abdominal pain - no  Diarrhea - no  Anemia - no  GI bleeding - no  Other - no    Colonoscopy  Screening - no  History of polyps - no  Diarrhea - no  Anemia - no  Blood in stools - no  Abdominal pain - no  Other - Crohn's disease    ROS:  CONSTITUTIONAL: Denies weight change,  fatigue, fevers, chills, night sweats.  CARDIOVASCULAR: Denies chest pain, shortness of breath, orthopnea and edema.  RESPIRATORY: Denies cough, hemoptysis, dyspnea, and wheezing.  GI: See HPI.    Medical History:   Past Medical History:   Diagnosis Date    Anemia     Crohn's disease S/P multiple surgery (ileum, colonh/o perianal and buttock fistulas with abscess with severe anal stricture requiring frequent dilation, h/o ileocolonic resections) 01/31/2017    1973 large bowel resection 1979 further colonic resection 12/17/2010: EUA, dilation of rectal stricture and placement of setons Xs 4    Esophageal stricture     History of Breast cancer dxed 4/2014 4/2014 diagnosed, Lumpectomy 5/2014, S/P Chemo/XRT completed Nov/Dec 2014, on tamoxifen    Immunosuppressed status     Vitamin B12 deficiency     Vitamin D deficiency 01/31/2017       Surgical History:   Past Surgical History:   Procedure Laterality Date    APPENDECTOMY      BIOPSY OF ANUS  4/10/2019    Procedure: BIOPSY, ANUS;  Surgeon: Rhett Aparicio MD;  Location: [facility] OR 2ND FLR;  Service: Colon and Rectal;;    BREAST LUMPECTOMY Right     CHOLECYSTECTOMY      colon resections      COLON SURGERY  1972 or 1973, 1979    COLONOSCOPY      COLONOSCOPY N/A 10/24/2016    Procedure: COLONOSCOPY;  Surgeon: Jamie Polo MD;  Location: [facility] (4TH FLR);  Service:  Endoscopy;  Laterality: N/A;    COLONOSCOPY N/A 8/29/2017    Procedure: COLONOSCOPY;  Surgeon: Diamond Barbour MD;  Location: [facility] ENDO (4TH FLR);  Service: Endoscopy;  Laterality: N/A;  schedule as 45 minute case    COLONOSCOPY N/A 10/10/2018    Procedure: COLONOSCOPY;  Surgeon: Diamond Barbour MD;  Location: [facility] ENDO (4TH FLR);  Service: Endoscopy;  Laterality: N/A;    COLONOSCOPY N/A 12/19/2019    Procedure: COLONOSCOPY;  Surgeon: Diamond Barbour MD;  Location: [facility] ENDO (4TH FLR);  Service: Endoscopy;  Laterality: N/A;    COLONOSCOPY N/A 12/14/2020    Procedure: COLONOSCOPY;  Surgeon: Jamie Polo MD;  Location: [facility] ENDO (4TH FLR);  Service: Endoscopy;  Laterality: N/A;    COLONOSCOPY N/A 3/17/2022    Procedure: Colonoscopy;  Surgeon: Jamie Polo MD;  Location: [facility] ENDO (4TH FLR);  Service: Endoscopy;  Laterality: N/A;  Pt. is Fully Vaccinated. 3/16 spoke with pt informed of new arrival time of 12pm-pt verbalized undersanding-tb    ESOPHAGOGASTRODUODENOSCOPY      ESOPHAGOGASTRODUODENOSCOPY N/A 10/10/2018    Procedure: EGD (ESOPHAGOGASTRODUODENOSCOPY);  Surgeon: Diamond Barbour MD;  Location: [facility] ENDO (4TH FLR);  Service: Endoscopy;  Laterality: N/A;    ESOPHAGOGASTRODUODENOSCOPY N/A 12/19/2019    Procedure: EGD (ESOPHAGOGASTRODUODENOSCOPY);  Surgeon: Diamond Barbour MD;  [facility]  Service: Endoscopy;  Laterality: N/A;    ESOPHAGOGASTRODUODENOSCOPY N/A 1/29/2020    Procedure: ESOPHAGOGASTRODUODENOSCOPY (EGD);  Surgeon: Jamie Polo MD;  [facility]  Service: Endoscopy;  Laterality: N/A;  with Dr. Polo last week of January or early February per Dr. Trevino-BB    ESOPHAGOGASTRODUODENOSCOPY N/A 12/14/2020    Procedure: EGD (ESOPHAGOGASTRODUODENOSCOPY);  Surgeon: Jamie Polo MD;  Location: [facility] ([address] [place] [address]);  Service: Endoscopy;  Laterality: N/A;  rapid-3 hours from any testing site-pt requested this time-tb    ESOPHAGOGASTRODUODENOSCOPY N/A  3/17/2022    Procedure: ESOPHAGOGASTRODUODENOSCOPY (EGD);  Surgeon: Jamie Polo MD;  Location: [facility] ENDO (4TH FLR);  Service: Endoscopy;  Laterality: N/A;  schedule for 60 minute case  Pt requesting Dr. Barbour or Dr. Polo only    ESOPHAGOGASTRODUODENOSCOPY N/A 12/20/2022    Procedure: EGD (ESOPHAGOGASTRODUODENOSCOPY);  Surgeon: Jamie Polo MD;  Location: [facility] ENDO (4TH FLR);  Service: Endoscopy;  Laterality: N/A;  instr portal -ml  12/13/22-precall completed-  12/19-AWARE OF CHANGE IN PROCEDURE TIME-RT    EXAMINATION UNDER ANESTHESIA Left 4/10/2019    Procedure: Exam under anesthesia botox;  Surgeon: Rhett Aparicio MD;  Location: [facility] OR 2ND FLR;  Service: Colon and Rectal;  Laterality: Left;  S/P- Medi port placed to upper left chest    fistula repairs      KNEE SURGERY  left    SMALL INTESTINE SURGERY      1997    UPPER GASTROINTESTINAL ENDOSCOPY         Family History:   Family History   Problem Relation Age of Onset    Cancer Mother 60        colon    Colon cancer Mother 60    Heart attack Father     Breast cancer Sister     Anesthesia problems Sister     Heart disease Brother     Ovarian cancer Maternal Aunt     Stomach cancer Maternal Uncle 68    Heart disease Paternal Uncle     Pancreatic cancer Maternal Grandmother     Celiac disease Neg Hx     Cirrhosis Neg Hx     Colon polyps Neg Hx     Crohn's disease Neg Hx     Cystic fibrosis Neg Hx     Esophageal cancer Neg Hx     Hemochromatosis Neg Hx     Inflammatory bowel disease Neg Hx     Liver cancer Neg Hx     Liver disease Neg Hx     Rectal cancer Neg Hx     Ulcerative colitis Neg Hx     Gadiel's disease Neg Hx        Social History:   Social History     Tobacco Use    Smoking status: Never    Smokeless tobacco: Never   Substance Use Topics    Alcohol use: No    Drug use: No       Allergies: Reviewed    Medications:   Current Facility-Administered Medications on File Prior to Encounter   Medication Dose Route Frequency Provider Last Rate Last  Admin    lidocaine (PF) 10 mg/ml (1%) injection 10 mg  1 mL Intradermal Once Marcia Noel NP        mupirocin 2 % ointment   Nasal On Call Procedure Marcia Noel NP   Given at 04/10/19 0655     Current Outpatient Medications on File Prior to Encounter   Medication Sig Dispense Refill    cholecalciferol, vitamin D3, (VITAMIN D3) 250 mcg (10,000 unit) Cap capsule Take by mouth once daily.      cyanocobalamin 1,000 mcg/mL injection 1,000 mcg every 30 days.   1    fluticasone propionate (FLONASE) 50 mcg/actuation nasal spray 1 spray by Each Nostril route once daily.      infliximab-dyyb (INFLECTRA IV) Inject 10 mg/kg into the vein every 28 days.      losartan (COZAAR) 50 MG tablet Take 50 mg by mouth once daily.      tamoxifen (NOLVADEX) 20 MG Tab Take 20 mg by mouth every evening .      POLYTUSSIN DM,DEXBROMPHENIRMN, 2-7.5-15 mg/5 mL Liqd Take by mouth.      predniSONE (DELTASONE) 10 MG tablet Take by mouth.      tacrolimus (PROTOPIC) 0.1 % ointment Apply 1 application topically 2 (two) times daily.      triamcinolone acetonide 0.1% (KENALOG) 0.1 % ointment Apply 1 application topically 2 (two) times daily.         Physical Exam:  Vital Signs:   Vitals:    02/20/24 0922   BP: 137/66   Pulse: 92   Resp: 16   Temp: 98.1 °F (36.7 °C)     General Appearance: Well appearing in no acute distress  ENT: OP clear  Chest: CTA B  CV: RRR, no m/r/g  Abd: s/nt/nd/nabs  Ext: no edema    Labs:Reviewed    Plan:   I have explained the risks and benefits of upper endoscopy and colonoscopy to the patient including but not limited to bleeding, perforation, infection, and death. The patient wishes to proceed.

## 2024-02-22 LAB
FINAL PATHOLOGIC DIAGNOSIS: NORMAL
GROSS: NORMAL
Lab: NORMAL

## 2024-02-22 NOTE — ANESTHESIA POSTPROCEDURE EVALUATION
Anesthesia Post Evaluation    Patient: Aline uFller    Procedure(s) Performed: Procedure(s) (LRB):  COLONOSCOPY (N/A)  EGD (ESOPHAGOGASTRODUODENOSCOPY) (N/A)    Final Anesthesia Type: general      Patient location during evaluation: PACU  Patient participation: Yes- Able to Participate  Level of consciousness: awake and alert and oriented  Pain management: adequate  Airway patency: patent    PONV status at discharge: No PONV  Anesthetic complications: no      Cardiovascular status: blood pressure returned to baseline and hemodynamically stable  Respiratory status: unassisted  Hydration status: euvolemic  Follow-up not needed.              Vitals Value Taken Time   /66 02/20/24 1130   Temp 36.7 °C (98 °F) 02/20/24 1105   Pulse 101 02/20/24 1130   Resp 22 02/20/24 1130   SpO2 100 % 02/20/24 1130         Event Time   Out of Recovery 11:53:04         Pain/Jil Score: No data recorded

## 2024-03-27 ENCOUNTER — OFFICE VISIT (OUTPATIENT)
Dept: GASTROENTEROLOGY | Facility: CLINIC | Age: 64
End: 2024-03-27
Payer: COMMERCIAL

## 2024-03-27 VITALS — BODY MASS INDEX: 25.23 KG/M2 | HEIGHT: 66 IN | WEIGHT: 157 LBS

## 2024-03-27 DIAGNOSIS — K50.818 CROHN'S DISEASE OF BOTH SMALL AND LARGE INTESTINE WITH OTHER COMPLICATION: Primary | ICD-10-CM

## 2024-03-27 PROCEDURE — 1159F MED LIST DOCD IN RCRD: CPT | Mod: CPTII,95,, | Performed by: INTERNAL MEDICINE

## 2024-03-27 PROCEDURE — 3008F BODY MASS INDEX DOCD: CPT | Mod: CPTII,95,, | Performed by: INTERNAL MEDICINE

## 2024-03-27 PROCEDURE — 4010F ACE/ARB THERAPY RXD/TAKEN: CPT | Mod: CPTII,95,, | Performed by: INTERNAL MEDICINE

## 2024-03-27 PROCEDURE — 99215 OFFICE O/P EST HI 40 MIN: CPT | Mod: 95,,, | Performed by: INTERNAL MEDICINE

## 2024-03-27 PROCEDURE — 1160F RVW MEDS BY RX/DR IN RCRD: CPT | Mod: CPTII,95,, | Performed by: INTERNAL MEDICINE

## 2024-03-27 PROCEDURE — 1126F AMNT PAIN NOTED NONE PRSNT: CPT | Mod: CPTII,95,, | Performed by: INTERNAL MEDICINE

## 2024-03-27 PROCEDURE — G2211 COMPLEX E/M VISIT ADD ON: HCPCS | Mod: 95,,, | Performed by: INTERNAL MEDICINE

## 2024-03-27 NOTE — PROGRESS NOTES
[facility]             Inflammatory Bowel Disease   Follow-up  Note              TODAY'S VISIT DATE:  3/27/2024    Chief Complaint:   Chief Complaint   Patient presents with    Crohn's Disease     PCP: Radha Engle    Previous History:  Aline Fuller is a 63 y.o.  female with Crohn's disease S/P surgery with anal stricture and anastomotic/ileum stricture (h/o perianal and buttock fistulas with abscess with severe anal stricture dilation, S/P multiple surgeries including partial colectomy in 1973, 1979 and 1997),  history of breast cancer (dxed 4/2014, lumpectomy 5/2014, chemo/XRT and then tamoxifen), GERD with esophageal stricture.  She initially started with symptoms of abdominal pain, weight loss, and diarrhea in 1972 and had an ex lap in 1973 with 6 inches of colon removed that was non-diagnostic for CD.  She was not diagnosed until 1979 after her second ex lap with 6 inches of small bowel removed.  She was initially treated with several courses of prednisone.  Early in her diagnosis, she took sulfasalazine though it was discontinued due to a rash, dipentum though it was not effective long term, and imuran though it had to be discontinued due to bone marrow suppression.  Her symptoms progressed which resulted in an SBO that prompted her third ex lap in 1997 that resulted in 6 inches of small bowel being ressected.  She was in clinic remission and on no medications from 1997 to 2001 at which time she recalls having a fistula and and abscess that required I&D and ABX.  She had recurrence of fistula and abscess in 2004 or 2005.  She also took asacol, colazal, 6MP, and imuran that were all ineffective though possible anemia with 6MP/Imuran.  In 2008 she started humira with induction and maintenance but discontinued after 3 months because patient broke out with a rash though she also felt it was ineffective.   She established care with Dr. Polo in 2010 and had a colonoscopy 10/2010  which showed fistulas, anal stricture dilated, and active inflammation at the ileocolonic anastomosis that could not be traversed.  In 12/2010 she had perianal Crohn's disease that was treated with dilation of rectal stricture and seton placement.  She started remicade 2/2011.  MRI 4/2011 showed multiple perianal fistulas with seton removal in approximately 5/2011.  At some time in 2012, her remicade dose frequency was increased to 5 mg/kg to every 6 weeks.  She was diagnosed with stage 1 breast cancer 4/2014  that was treated with lumpectomy 5/2014 followed by chemo/XRT with tamoxifen. Since 2012 she continues on remicade 5 mg/kg every 6 weeks though it was held during chemotherapy from 6/2014-4/2015 and restarted with induction and maintenance of 5 mg/kg every 6 weeks.   Colonoscopy 10/2016 showed continued anal stricture with inflammation throughout the colon and neoterminal ileum could not be intubated.  In 2/2017 [facility] trough IFX levels/abs were undetectable with no ABs.  In spring 2017 remicade was optimized to 10 mg/kg every 6 weeks. CTE 2/27/17 showed active CD involving the descending and sigmoid colon and rectum.  Decreased caliber at the ileocolic anastomosis with possible mucosal hyperenhancement.  Note is made of focal decreased caliber within the rectum, possibly transient.  Multiple perianal fistulas without evidence for abscess; heterogeneous enhancement within the uterus.  Appearance is not certainly secondary to leiomyomas.  Given postmenopausal age group, recommend further evaluation with pelvic ultrasound to assess endometrium.   She was last seen in our clinic 8/2017 at which time we recommended yearly SB imaging with MRE and anal stricture dilation of constipation returns by Dr. Mcclure with EUA though o/w plans to dilate at time of colonoscopy yearly.  MRE 6/25/18 showed some suspected luminal narrowing to approximately 5 mm at level of the ileocolic anastomosis RIGHT lower quadrant without  evidence for upstream dilatation of bowel questionable minimal hyper enhancement without evidence for mesenteric edema. In 12/2019 she had EGD and colonoscopy with EGD showing diffuse esohageal candiasis and dilation of esophageal stricture though not fully dilated due to risk of perforation with bleeding at time of dilation initially. In 4/2019 she had EUA with Dr. Aparicio with anal biopsies showing no dysplasia.   In 12/2019 patient had colonoscopy which showed buttock fistula opening that is closed and scarring and hypopigmentation of the skin. Severe anal stenosis and large perianal skin tags. Stricture dilated by KARON. The remainder of the colon was normal though scope aborted in transverse colon due to significant fixed looping. After her scopes 12/2019 pt was upset b/c she did not recall plan of care outlined after procedures so I called and explained that with this tight anal stricture we feel surgery with colostomy was ideal given risk of cancer and obstruction though she did not wish to make a f/u appt with CRS at that time. Due to esophageal candidiasis and immunosuppression she was given a 3 week course of diflucan.  At that time she continued follow up with our recommendations and repeat EGD with Dr. Polo for esophageal stricture dilation after candidiasis treatment. Repeat EGD 1/29/19 showed a severe esophageal stricture of lower third of the esophagus with dilator passed through the scope and on reinsertion moderate mucosal disruption with normal stomach and duodenum. On 12/14/20 patient had dilation of esophageal stricture again up to 12 mm with normal stomach and duodenum. Colonoscopy 12/2020 showed perianal skin tags with severe anal stenosis, prior end to side ileocolonic anastomosis of the transverse colon which could not be traversed with normal colon. Biopsies of transverse/descending/sigmoid normal, biopsies of rectum with focal bifid crypt architectural distortion. Per patient Dr. Aparicio was  present and dilated stricture during the scope with Dr. Polo. She continues on remicade 10 mg/kg every 6 weeks (([id number]--reinitiated 2016 with induction and then every 6 weeks maintenance, 10 mg/kg q 6 weeks spring 2017) and she has 1-3 BMs/day, loose to soft, no blood and has rare incontinence every few weeks. Once every few weeks she has a nocturnal BM. Often her bowel movements depends on what she ate and what time she ate. She has some dysphagia with dry solid (chicken breast, fish) and if she eats small bites and chews things slowly then overall does okay. Every few weeks has no bowel movements with bloating and difficulty evacuating which lasts for 2-3 days and occurs every 2-3 days and then once constipation resolves she then has diarrhea.  She drinks a lot of water and occasionally a fleets enemas. MRE 6/2021 around the area of anastomosis there is tethering and fibrotic bands with multiple tortuous SB loops and luminal narrowing at the anastomosis without significant upstream dilatation, cholecystectomy with mild CBD prominence.  On 1/3/2022 trough infliximab level 15/antibodies negative on inflectra 10 mg/kg q 4 weeks.   EGD/colonoscopy deferred from 12/2021 to 3/2022.  In 3/2022 EGD and colonoscopy (performed by Dr. Polo who was previously taking care of her) which was significant for benign appearing esophageal stenosis at GEJ dilated to 10 mm and colonoscopy 3/2022 showed hypertrophied skin tags, benign appearing anal stenosis traversed after digital dilation, severe ileocolonic anastomotic stricture not traversed with solitary apthous ulcer at the anastamosis with remainder of colon normal. Biopsies of ascending/transverse/descending/sigmoid and rectum normal. In 5/2021 her trough IFX level 3.3 and in 11/2021 her inflectra was optimized to 10 mg/kg q 4 weeks from q 6 weeks followed by repeat trough IFX levels 1/3/22 15/Abs neg. In 10/2022 she had upper abd pain, diarrhea and nocturnal stooling  and imodium with remicade helped. MRE 5/2022 c/w significant distal ileal stricture at the anastomosis extending 5-6 cm with no upstream dilation, left perianal and gluteal soft tissue enhancement likely related to scarring and healing from prior perianal fistulas. She had dysphagia so we referred her for urgent EGD. Patient started with eczema 10/2022 and she required prednisone for short time and then has had good control with tacrolimus ointment and kenalog cream.  In 4/2023 patient had MRE which shows known Crohn's disease and prominent luminal narrowing at the level of the ileocolonic anastomosis and lesser extent of the distal ileum with no upstream dilation, stable left fistula tract.    Interval History:  - current IBD meds: inflectra 10 mg/kg q 4 weeks (10 mg/kg q 4 weeks from q 6 weeks started 11/2021, LD 3/27, ND 4/25)  - eczema treatment- prednisone prn, continues tacrolimus ointment/kenalog cream and approved for dupixent and has appt with derm and not sure if she will be starting   - 1-2 soft BMs/d, no blood, twice a week has nocturnal BM  - ongoing anemia - epo injections every 2 weeks, last IV iron >5 years ago and not taking oral iron and per pt iron levels are normal and Hgb overall stable with epo injections  - dysphagia resolved after EGD with dilation  - 2/2024 EGD:  One benign-appearing, intrinsic severe stenosis was found 38 cm from the incisors. This stenosis measured 7 mm (inner diameter) x less than one cm (in length). The stenosis was traversed after dilation.  A TTS dilator was passed through the scope. Dilation with a 10-11-12 mm balloon dilator was performed to 12 mm. The dilation site was examined and showed moderate mucosal disruption and moderate improvement in luminal narrowing. The exam of the esophagus was otherwise normal. The cardia and gastric fundus are normal on retroflexion, stomach is normal, duodenum is normal  - 2/2024 colonoscopy:  anal stricture dilated with gentle  digital pressure, prior end to side ileocolonic anastomosis in ascending colon and patent mild stenosis with anastomosis not traversed due to looping in the colon rather than from the stenosis, distal ileum visualized from the colon is normal. Biopsies TI mild active ileitis, ascending/transverse/descending/sigmoid. Biopsies of TI with mild active ileitis, ascending colon/transverse/descending/sigmoid/rectum normal  - today pt insists that she was told that she had inflammation scopes and will need a treatment change and I reviewed Dr. South's note and   - drinking a lot of water which helps, doesn't take miralax, takes dulcolax twice a last 2-3 mos  -12/20/22  Benign-appearing esophageal stenosis at the GE junction, dilated up to 12 mm  - NSAID use: No  - Narcotic use: No  - Alternative/complementary meds for IBD: No    Previous Surgeries:  1973 large bowel resection  1979 further colonic resection  12/17/2010: EUA, dilation of rectal stricture and placement of setons Xs 4    Last pertinent Endoscopy/Imaging:  3/17/22 EGD (Dr. Jamie Polo): benign appearing esophageal stenosis at GE junction dilated to 10 mm  4/2023 MRE: Known Crohn's disease with no imaging signs of active inflammation. Prominent luminal narrowing at the level of the ileocecal anastomosis and to a lesser extent involving the distal ileum.  No upstream dilated loops of bowel to suggest obstructive process. Stable left fistula tract.  No new perianal abscess or fluid-filled tract.  2/2024 EGD:  One benign-appearing, intrinsic severe stenosis was found 38 cm from the incisors. This stenosis measured 7 mm (inner diameter) x less than one cm (in length). The stenosis was traversed after dilation.  A TTS dilator was passed through the scope. Dilation with a 10-11-12 mm balloon dilator was performed to 12 mm. The dilation site was examined and showed moderate mucosal disruption and moderate improvement in luminal narrowing. The exam of the esophagus  was otherwise normal. The cardia and gastric fundus are normal on retroflexion, stomach is normal, duodenum is normal  2024 colonoscopy:  anal stricture dilated with gentle digital pressure, prior end to side ileocolonic anastomosis in ascending colon and patent mild stenosis with anastomosis not traversed due to loopin int he colon rathan than from the stenosis, distal ileum visualized from the colon is normal. Biopsies TI mild active ileitis, ascending/transverse/descending/sigmoid. Biopsies of TI with mild active ileitis, ascending colon/transverse/descending/sigmoid/rectum normal    Therapeutic drug monitorin2017 [facility] IFX level <1.0, Ab neg  17 IFX 3.0/Abs neg  10/25/18 IFX 3.0/Abs neg  18 IFX 3.3/Abs neg  21 IFX level 3.3/ABs neg on remicade 10 mg/kg q 6 weeks  1/3/22 trough IFX level 15/Abs neg on inflectra 10 mg/kg q 4 weeks     Prior IBD Therapies:  Prednisone  Sulfasalazine- rash  Dipentum- ineffective  6MP- bone marrow suppression  Flagyl/Cipro- effective   Asacol- ineffective  Colazal- ineffective  Imuran  Humira-rash-  or     Vaccinations:  Lab Results   Component Value Date    HEPBSAB Negative 2017       Lab Results   Component Value Date    HEPBSURFABQU Negative 2021    HEPBSURFABQU <3 2021     Lab Results   Component Value Date    HEPAIGG Negative 2021     Lab Results   Component Value Date    VARICELLAZOS 1.86 (H) 2021    VARICELLAINT Positive (A) 2021     Lab Results   Component Value Date    MUMPSIGGSCRE 2.08 (H) 2021    MUMPSIGGINTE Positive (A) 2021      Lab Results   Component Value Date    RUBEOLAIGGAN 2.80 (H) 2021    RUBEOLAINTER Positive (A) 2021     Immunization History   Administered Date(s) Administered    COVID-19, MRNA, LN-S, PF (Pfizer) (Purple Cap) 2021, 2021, 10/31/2021    COVID-19, mRNA, LNP-S, PF, portia-sucrose, 30 mcg/0.3 mL (Pfizer  Ages 12+) 2023    Hepatitis A /  Hepatitis B 01/20/2011, 02/18/2011, 08/12/2011    Influenza - High Dose - PF (65 years and older) 10/28/2019    Influenza - Quadrivalent - PF *Preferred* (6 months and older) 09/18/2018, 10/28/2019, 11/04/2020, 11/10/2021    Pneumococcal Conjugate - 13 Valent 08/26/2015, 12/12/2016    Pneumococcal Polysaccharide - 23 Valent 05/03/2021    Tdap 04/27/2009     Flu shot: recommended yearly  COVID vaccine/booster: per CDC guidelines  RSV: recommended   Tetanus (TdaP): was due 4/2019, reminded to get this done  HPV:    NA  Meningococcal: NA  Hepatitis B: recommended  Hepatitis A:  Recommended   Shingrix: had 1 shot, due for next one in few weeks- will send us dates    Review of Systems   Constitutional:  Negative for chills, fever and weight loss.   HENT:          No oral ulcers, dysphagia, oral thrush   Eyes:  Negative for blurred vision, pain and redness.   Respiratory:  Negative for cough and shortness of breath.    Cardiovascular:  Negative for chest pain.   Gastrointestinal:  Negative for abdominal pain, heartburn, nausea and vomiting.   Genitourinary:  Negative for dysuria and hematuria.   Musculoskeletal:  Negative for back pain and joint pain.   Skin:  Negative for rash.   Psychiatric/Behavioral:  Negative for depression. The patient is not nervous/anxious and does not have insomnia.       All Medical History/Surgical History/Family History/Social History/Allergies have been reviewed and updated in EMR    Outpatient Medications Marked as Taking for the 3/27/24 encounter (Office Visit) with Diamond Barbour MD   Medication Sig Dispense Refill    cholecalciferol, vitamin D3, (VITAMIN D3) 250 mcg (10,000 unit) Cap capsule Take by mouth once daily.      cyanocobalamin 1,000 mcg/mL injection 1,000 mcg every 30 days.   1    epoetin mario-epbx 20,000 unit/2 mL Soln Inject as directed.      fluticasone propionate (FLONASE) 50 mcg/actuation nasal spray 1 spray by Each Nostril route once daily.      infliximab-dyyb  "(INFLECTRA IV) Inject 10 mg/kg into the vein every 28 days.      pantoprazole (PROTONIX) 40 MG tablet Take 1 tablet (40 mg total) by mouth once daily. 30 tablet 11    tacrolimus (PROTOPIC) 0.1 % ointment Apply 1 application topically 2 (two) times daily.      triamcinolone acetonide 0.1% (KENALOG) 0.1 % ointment Apply 1 application topically 2 (two) times daily.       Vital Signs:  Ht 5' 6" (1.676 m)   Wt 71.2 kg (157 lb)   BMI 25.34 kg/m²    Physical Exam     Labs:   Lab Results   Component Value Date    CRP 3.5 05/03/2021     Lab Results   Component Value Date    HEPBSAG Non-reactive 02/20/2024    HEPBCAB Non-reactive 02/20/2024     Lab Results   Component Value Date    TBGOLDPLUS Negative 02/20/2024     Lab Results   Component Value Date    KKUROZWT39RY 41 05/02/2022    JDQYPCRJ51 913 02/20/2024     Lab Results   Component Value Date    WBC 9.34 02/20/2024    HGB 10.8 (L) 02/20/2024    HCT 35.8 (L) 02/20/2024    MCV 91 02/20/2024     02/20/2024     Lab Results   Component Value Date    CREATININE 1.2 02/20/2024    ALBUMIN 3.5 02/20/2024    BILITOT 0.4 02/20/2024    ALKPHOS 104 02/20/2024    AST 25 02/20/2024    ALT 23 02/20/2024     Assessment/Plan:  Aline Fuller is a 63 y.o. female with Crohn's disease S/P surgery with anal stricture and anastomotic/ileum stricture (h/o perianal and buttock fistulas with abscess with severe anal stricture dilation, S/P multiple surgeries including partial colectomy in 1973, 1979 and 1997),  history of breast cancer (dxed 4/2014, lumpectomy 5/2014, chemo/XRT and then tamoxifen), GERD with esophageal stricture.     She continues on inflectra 10 mg/kg q 4 weeks with overall stable symptoms with EGD and colonoscopy 2/2024 at which time esophageal stricture dilated on EGD and colonoscopy always technically difficult not allowing for full intubation of ileum but limited view from colon showed normal ileum and colon was normal except for mild anal canal stenosis. Today I " also reviewed her MRE which does not show any active inflammation.  I will not make any treatment changes at this time and did let patient know if her current anemia is not iron def anemia then this is not likely a reflection of active inflammation and chronic blood loss and other etiology. For this she is being followed by hematology and getting epo injections and will defer workup to them. It was recommended she had repeat EGD with dilation in 1 month since last but pt does not wish to proceed this soon and has no recurrence in dysphagia. I recommended she contact us for any recurrent dysphagia symptoms and we can get her scheduled more urgently for an EGD per her preference though will for now schedule her for repeat EGD in 6 mos with MRE and labs same day in 8/2024.     # Crohn's disease S/P surgery with anal stricture and ileocolonic anastomotic stricture (ileum, colon, h/o perianal and buttock fistulas with abscess with severe anal stricture dilation, S/P multiple surgeries including partial colectomy in 1973, 1979 and 1997)  - note:  history of breast cancer (dxed 4/2014, lumpectomy 5/2014, chemo/XRT and then tamoxifen)  - note: eczema- worse since switching from remicade to inflectra, may have to consider switch to brand name if eczema does not improve  - continue inflectra 10 mg/kg q 4 weeks  - anal stricture- dilated 2/2024, for constipation sx- uses prn saline enemas and dulcolax  - anastomotic/SB stricture and tethering- no upstream dilation, unable to fully intubate ileum though on scope 2/2024 limited view of ileum from colon appeared normal, last MRE 4/2023, repeat 8/2024 at time of EGD/labs   - continue low residue diet and chewing food well   - colonoscopy 2/2025- sooner if any constipation requiring anal stricture dilation or if MRE shows abnormalities requiring sooner scope, in future may consider peds slim scope  - vitamin B12- continue vit B12 1000 mcg IM q 4 weeks  - drug monitoring labs:  CBC/CMP q 6 mos (8/2024- arrange same day as MRE/EGD), TPMT (1/2017 TPMT 32.6), TB quantiferon (2/2025), Hep B testing (8/2024)  - TDM: trough remicade levels/abs will arrange at a later date    # Distal esophageal stricture with GERD  - dysphagia resolved after recent dilation of stricture by Dr. South 2/2024, repeat recommended 3/2024 but pt defers and will let us know if any recurrent symptoms but o/w plan on repeat EGD q 6 mos with next 8/2024    - reminded pt to let us know if any recurrent symptoms     # Immunodeficiency due to long term immunosuppressive drug therapy and  IBD specific health maintenance:  CRC risk- sx 1972, >1/3 colon, surveillance colonoscopy q 1-2 years  Skin exam yearly- normal 11/2023, outside dermatologist- Dr. Shapley  Osteopenia- postmenopausal, unable to swallow calcium supplements  Pap smear yearly- normal 3/2023, reminded to schedule repeat now   Vitamin D- continue vit D 3 takes two---5000 IU/d=10,000 IU/d  Vaccines: no live vaccines, RSV, tetanus, hep B series recommended, pt says she received shingrix #2 and hep A and will send us dates so we can update her immunization records    Visit today is associated with current or anticipated ongoing medical care related to this patient's single serious condition/complex condition Crohn's disease.     The patient location is: Home  The chief complaint leading to consultation is: Crohn's disease     Visit type: audiovisual    Face to Face time with patient: 20 minutes  45 minutes of total time spent on the encounter, which includes face to face time and non-face to face time preparing to see the patient (eg, review of tests), Obtaining and/or reviewing separately obtained history, Documenting clinical information in the electronic or other health record, Independently interpreting results (not separately reported) and communicating results to the patient/family/caregiver, or Care coordination (not separately reported).     Each patient  to whom he or she provides medical services by telemedicine is:  (1) informed of the relationship between the physician and patient and the respective role of any other health care provider with respect to management of the patient; and (2) notified that he or she may decline to receive medical services by telemedicine and may withdraw from such care at any time.    Diamond Barbour MD  Department of Gastroenterology  Medical Director, Inflammatory Bowel Disease

## 2024-03-27 NOTE — PATIENT INSTRUCTIONS
- EGD in 8/2024  - continue remicade   - MRE 8/2024  - labs 8/2024  - email us the dates of our last vaccines so we can update your records   - get tetanus and hep B series locally and send us dates  - GI at [facility] Dr. Susie Palacios if you want to have local IBD doctor

## 2024-05-03 ENCOUNTER — TELEPHONE (OUTPATIENT)
Dept: GASTROENTEROLOGY | Facility: CLINIC | Age: 64
End: 2024-05-03
Payer: COMMERCIAL

## 2024-05-03 NOTE — TELEPHONE ENCOUNTER
- Received faxed request from [facility] for updated acute infusion reaction orders    - Current therapy:  inflectra 10 mg/kg q 4 weeks (10 mg/kg q 4 weeks from q 6 weeks started 11/2021, LD 4/25, ND 5/23)   - Last office visit: 3/27/24  - Labs: CBC/CMP 2/2024, repeat 8/2024, TB + Hep B neg 2/2024, repeat 2/2025  - Next office visit:  9/25/24  - Allergies reviewed.     Updated acute infusion reaction orders faxed back to [facility] ([phone])  Scanned fax into media tab.

## 2024-05-29 ENCOUNTER — TELEPHONE (OUTPATIENT)
Dept: GASTROENTEROLOGY | Facility: CLINIC | Age: 64
End: 2024-05-29
Payer: COMMERCIAL

## 2024-05-31 ENCOUNTER — TELEPHONE (OUTPATIENT)
Dept: ENDOSCOPY | Facility: HOSPITAL | Age: 64
End: 2024-05-31
Payer: COMMERCIAL

## 2024-05-31 NOTE — TELEPHONE ENCOUNTER
Contacted and spoke with patient to schedule EGD patient states she was told this would only be schedule if needed and she is doing fine. Patient states she don't think she needs to schedule at this time.Informed patient I would send a message to the doctor and staff. Pending results.

## 2024-05-31 NOTE — TELEPHONE ENCOUNTER
"----- Message from Trinh Cardoso RN sent at 2024  8:24 AM CDT -----    ----- Message -----  From: Kacey Barksdale RN  Sent: 3/27/2024  11:19 AM CDT  To: [facility] Staff; #    Procedure: EGD    Diagnosis: Crohn's Disease & esophageal stricture    Procedure Timin-12 weeks; 2024- Need to coordinate same day MRE, Hep A #1, & labs    #If within 4 weeks selected, please herb as high priority#    #If greater than 12 weeks, please select "5-12 weeks" and delay sending until 3 months prior to requested date#     Provider: Dr. South    Location: [address]    Additional Scheduling Information: No scheduling concerns    Prep Specifications:Standard prep    Is the patient taking a GLP-1 Agonist:no    Have you attached a patient to this message: yes  "

## 2024-06-03 ENCOUNTER — TELEPHONE (OUTPATIENT)
Dept: GASTROENTEROLOGY | Facility: CLINIC | Age: 64
End: 2024-06-03
Payer: COMMERCIAL

## 2024-06-03 NOTE — TELEPHONE ENCOUNTER
Spoke with Aline:  - pt declined to schedule EGD for 08/2024, says she's been doing well with only needing this annually and she will let us know if it gets to be that time, Dr. Barbour updated and agrees  - if pt calls back and needs Urgent EGD, schedule with Dr. South or Dr. Polo at [facility]  - she requests a message to be sent to her to schedule the MRI with available dates  - Hep A #1 vaccine same day as MRI  - labs due 08/2024: (CBC, CMP) she'd like to have labs done at [facility] in [place] in the future

## 2024-06-14 ENCOUNTER — TELEPHONE (OUTPATIENT)
Dept: GASTROENTEROLOGY | Facility: CLINIC | Age: 64
End: 2024-06-14
Payer: COMMERCIAL

## 2024-06-14 DIAGNOSIS — K50.818 CROHN'S DISEASE OF BOTH SMALL AND LARGE INTESTINE WITH OTHER COMPLICATION: Primary | ICD-10-CM

## 2024-06-14 RX ORDER — EPINEPHRINE 1 MG/ML
0.3 INJECTION, SOLUTION, CONCENTRATE INTRAVENOUS
OUTPATIENT
Start: 2024-06-14

## 2024-06-14 RX ORDER — INFLIXIMAB-DYYB 100 MG/10ML
INJECTION, POWDER, LYOPHILIZED, FOR SOLUTION INTRAVENOUS
Qty: 7 EACH | Refills: 3 | Status: SHIPPED | OUTPATIENT
Start: 2024-06-14

## 2024-06-14 RX ORDER — DIPHENHYDRAMINE HYDROCHLORIDE 50 MG/ML
25 INJECTION INTRAMUSCULAR; INTRAVENOUS
OUTPATIENT
Start: 2024-06-14

## 2024-06-14 RX ORDER — SODIUM CHLORIDE 0.9 % (FLUSH) 0.9 %
10 SYRINGE (ML) INJECTION
OUTPATIENT
Start: 2024-06-14

## 2024-06-14 RX ORDER — METHYLPREDNISOLONE SOD SUCC 125 MG
40 VIAL (EA) INJECTION
OUTPATIENT
Start: 2024-06-14

## 2024-06-14 RX ORDER — ACETAMINOPHEN 325 MG/1
650 TABLET ORAL
OUTPATIENT
Start: 2024-06-14

## 2024-06-14 RX ORDER — HEPARIN 100 UNIT/ML
500 SYRINGE INTRAVENOUS
OUTPATIENT
Start: 2024-06-14

## 2024-06-14 NOTE — TELEPHONE ENCOUNTER
----- Message from Trinh Cardoso RN sent at 6/14/2024  9:11 AM CDT -----    ----- Message -----  From: Heather Mann  Sent: 6/14/2024   8:59 AM CDT  To: [facility] Staff    [facility]      Ms Patterson  is calling to speak with someone in provider office regarding she needs to know if the office received the faxed orders for the patient infusion. She is asking for a return call please call [phone] if the office received please sign and fax back to [phone]

## 2024-06-14 NOTE — TELEPHONE ENCOUNTER
- Called [facility] ([phone]).  Rep Елена confirmed updated orders are needed for patient's next infusion on 6/24/24  - No specific form avaialble online, Елена suggests to fax   Rx to [facility]: [phone]     - Current therapy:   inflectra 10 mg/kg q 4 weeks (10 mg/kg q 4 weeks from q 6 weeks started 11/2021)   - Last office visit:   3/27/24  - Labs: CBC/CMP 2/2024, repeat 8/2024 ([facility] slips sent to patient) TB + Hep B neg 2/2024, repeat 2/2025  - Next office visit:  9/25/24  - Allergies reviewed.

## 2024-06-18 ENCOUNTER — TELEPHONE (OUTPATIENT)
Dept: ENDOSCOPY | Facility: HOSPITAL | Age: 64
End: 2024-06-18
Payer: COMMERCIAL

## 2024-06-19 NOTE — TELEPHONE ENCOUNTER
- Faxed signed IFX 10 mg/kg q4 week Rx to [facility]: [phone]   (Fax # advised by [facility] verbally for Rx faxing)    - Faxed signed IFX nursing orders to [facility]: [phone]

## 2024-06-19 NOTE — TELEPHONE ENCOUNTER
Spoke with pt  - [facility] send over request for updated nursing orders  - need to clarify how often Bryan has been flushing her medication port given that is requested on the paperwork  - patient confirmed she gets her port flushed before and after each infusion, she does not go for flushes between infusions.  Her port has been working great without issues  - Her next inflectra infusion is scheduled 6/24/24  - Advised pt that completed paperwork will be faxed back to [facility] today.

## 2024-07-24 ENCOUNTER — HOSPITAL ENCOUNTER (OUTPATIENT)
Dept: RADIOLOGY | Facility: HOSPITAL | Age: 64
Discharge: HOME | End: 2024-07-24
Attending: INTERNAL MEDICINE
Payer: COMMERCIAL

## 2024-07-24 DIAGNOSIS — K50.818 CROHN'S DISEASE OF BOTH SMALL AND LARGE INTESTINE WITH OTHER COMPLICATION: ICD-10-CM

## 2024-07-24 PROCEDURE — 74183 MRI ABD W/O CNTR FLWD CNTR: CPT | Mod: 26,,, | Performed by: INTERNAL MEDICINE

## 2024-07-24 PROCEDURE — A9585 GADOBUTROL INJECTION: HCPCS | Performed by: INTERNAL MEDICINE

## 2024-07-24 PROCEDURE — 74183 MRI ABD W/O CNTR FLWD CNTR: CPT | Mod: TC

## 2024-07-24 PROCEDURE — 72197 MRI PELVIS W/O & W/DYE: CPT | Mod: 26,,, | Performed by: INTERNAL MEDICINE

## 2024-07-24 PROCEDURE — 72197 MRI PELVIS W/O & W/DYE: CPT | Mod: TC

## 2024-07-24 PROCEDURE — 25500020 PHARM REV CODE 255: Performed by: INTERNAL MEDICINE

## 2024-07-24 RX ORDER — GADOBUTROL 604.72 MG/ML
10 INJECTION INTRAVENOUS
Status: COMPLETED | OUTPATIENT
Start: 2024-07-24 | End: 2024-07-24

## 2024-07-24 RX ADMIN — GADOBUTROL 10 ML: 604.72 INJECTION INTRAVENOUS at 02:07

## 2024-08-20 ENCOUNTER — TELEPHONE (OUTPATIENT)
Dept: GASTROENTEROLOGY | Facility: CLINIC | Age: 64
End: 2024-08-20
Payer: COMMERCIAL

## 2024-08-20 NOTE — TELEPHONE ENCOUNTER
Called [facility] ([phone]) and spoke with Leland  - no records of a calls made to our office this morning; checked wit medical and pharmacy benefits   - unable to find records of fax from 8/15  - unable to pull EOC ID # on fax form  - no pending authorizations or additional info needed from [facility] end   - rep was able to pull current authorization on inflectra by searching via code (Approved 9/16/23-9/16/24; ref # [id number])  - likely the call was made by PA dept ([phone]) but unable to confirm  - rep unable to help or connect to the correct department or provide any additional information        Time spent on the phone 52 minutes

## 2024-08-20 NOTE — TELEPHONE ENCOUNTER
----- Message from Trinh Cardoso RN sent at 8/20/2024  1:20 PM CDT -----  Regarding: FW: Advise  Contact: [phone]    ----- Message -----  From: Sara Kohli  Sent: 8/20/2024   9:00 AM CDT  To: [facility] Staff  Subject: Marjorie Bailey Prior Authorization Department is calling to speak to someone from office in regards to a prior authorization for a medication. She didn't state the name of the medication and stated she is unable to disclose that information. Please give [facility] a call.

## 2024-09-06 ENCOUNTER — TELEPHONE (OUTPATIENT)
Dept: GASTROENTEROLOGY | Facility: CLINIC | Age: 64
End: 2024-09-06
Payer: COMMERCIAL

## 2024-09-06 DIAGNOSIS — R79.89 ELEVATED LFTS: ICD-10-CM

## 2024-09-06 DIAGNOSIS — D84.9 IMMUNOSUPPRESSED STATUS: ICD-10-CM

## 2024-09-06 DIAGNOSIS — K50.818 CROHN'S DISEASE OF BOTH SMALL AND LARGE INTESTINE WITH OTHER COMPLICATION: Primary | ICD-10-CM

## 2024-09-06 NOTE — TELEPHONE ENCOUNTER
"Spoke with Aline:  - per Dr. Barbour, reviewed lab results  - has been taking Tylenol 500mg 2-3x/wk at HS d/t increased joint aches  - has been taking Imodium "a couple times a month" when she goes out to prevent any diarrhea episodes, not having any GI sxs at this time  - denies other med changes (Rx/OTC/Herbal) and ETOH use  - denies abd pain and jaundice sxs  - reviewed adequate hydration to promote kidney function and prevent constipation  - enc her to share this w her PCP, she plans to share with her Hematologist as well  - she gets labs drawn for her Hematologist q3w and would like to coordinate in the future, she will discuss with him to be sure this is something they can accommodate  - PM sent with date to repeat labs and lab orders  - she states understanding and agrees w this plan    Dr. Barbour will be updated.    "

## 2024-09-06 NOTE — TELEPHONE ENCOUNTER
----- Message from Diamond Barbour MD sent at 9/6/2024  9:25 AM CDT -----  Discuss labs with patient:  Stable anemia  Alk phos and ALT slightly elevated- repeat LFTs in 2 weeks along with GGT and BMP  Creatinine high- hydrate well and ask her to f/u with her primary care regarding this and if she would like we can fax these results with our phone note to them    SS  ----- Message -----  From: Vikram Cross  Sent: 9/6/2024   6:12 AM CDT  To: Diamond Barbour MD

## 2024-09-09 ENCOUNTER — TELEPHONE (OUTPATIENT)
Dept: GASTROENTEROLOGY | Facility: CLINIC | Age: 64
End: 2024-09-09
Payer: COMMERCIAL

## 2024-09-09 NOTE — TELEPHONE ENCOUNTER
Reviewed orders and got MD signature. Signed form faxed to infusion center. Successful fax transmittal e-mail received.   Infusion center:  [facility]

## 2024-09-12 ENCOUNTER — TELEPHONE (OUTPATIENT)
Dept: GASTROENTEROLOGY | Facility: CLINIC | Age: 64
End: 2024-09-12
Payer: COMMERCIAL

## 2024-09-12 NOTE — TELEPHONE ENCOUNTER
- Received Acute Infusion Reaction Rx and Treatment Guideline from [place]    - Current therapy: inflectra 10 mg/kg q 4 weeks (10 mg/kg q 4 weeks from q 6 weeks started 11/2021)  - Last office visit:  3/27/24  - Labs UTD  - Next office visit:  9/25/24  - Allergies reviewed.       - Prepared form for MD signature.

## 2024-09-23 ENCOUNTER — TELEPHONE (OUTPATIENT)
Dept: GASTROENTEROLOGY | Facility: CLINIC | Age: 64
End: 2024-09-23
Payer: COMMERCIAL

## 2024-09-23 DIAGNOSIS — K50.818 CROHN'S DISEASE OF BOTH SMALL AND LARGE INTESTINE WITH OTHER COMPLICATION: Primary | ICD-10-CM

## 2024-09-23 DIAGNOSIS — R79.89 LFT ELEVATION: ICD-10-CM

## 2024-09-23 NOTE — TELEPHONE ENCOUNTER
----- Message from Diamond Barbour MD sent at 9/23/2024  8:59 AM CDT -----  Discuss labs with patient  Kidney function abnormal- please be sure pt f/u with PCP and fax results to PCP  Alkaline phosphatase elevated along with GGT.  See if she can get MRCP done.   I think at last visit she was considering closer provider- only if my note mentions [facility]  May also need hepatology appt    SS  ----- Message -----  From: Vikram Cross  Sent: 9/21/2024   6:12 AM CDT  To: Diamond Barbour MD

## 2024-09-23 NOTE — TELEPHONE ENCOUNTER
Called & spoke to pt  - Reviewed labs significant for elevated kidney function, alk phos, & GGT  - Will get a copy of lab results sent to Dr. Chery per pt request  - Pt agreeable to [facility]C & wants to know if this can be completed closer to home; will discuss w/ Dr. Barbour  - All questions answered

## 2024-09-25 ENCOUNTER — OFFICE VISIT (OUTPATIENT)
Dept: GASTROENTEROLOGY | Facility: CLINIC | Age: 64
End: 2024-09-25
Payer: COMMERCIAL

## 2024-09-25 ENCOUNTER — TELEPHONE (OUTPATIENT)
Dept: GASTROENTEROLOGY | Facility: CLINIC | Age: 64
End: 2024-09-25
Payer: COMMERCIAL

## 2024-09-25 VITALS — HEIGHT: 66 IN | BODY MASS INDEX: 26.84 KG/M2 | WEIGHT: 167 LBS

## 2024-09-25 DIAGNOSIS — K62.4 ANAL STRICTURE: ICD-10-CM

## 2024-09-25 DIAGNOSIS — Z79.899 IMMUNODEFICIENCY DUE TO LONG TERM IMMUNOSUPPRESSIVE DRUG THERAPY: ICD-10-CM

## 2024-09-25 DIAGNOSIS — K50.818 CROHN'S DISEASE OF BOTH SMALL AND LARGE INTESTINE WITH OTHER COMPLICATION: Primary | ICD-10-CM

## 2024-09-25 DIAGNOSIS — D84.821 IMMUNODEFICIENCY DUE TO LONG TERM IMMUNOSUPPRESSIVE DRUG THERAPY: ICD-10-CM

## 2024-09-25 DIAGNOSIS — E53.8 VITAMIN B12 DEFICIENCY: ICD-10-CM

## 2024-09-25 DIAGNOSIS — K22.2 ESOPHAGEAL STRICTURE: ICD-10-CM

## 2024-09-25 DIAGNOSIS — T45.1X5A IMMUNODEFICIENCY DUE TO LONG TERM IMMUNOSUPPRESSIVE DRUG THERAPY: ICD-10-CM

## 2024-09-25 PROCEDURE — 1126F AMNT PAIN NOTED NONE PRSNT: CPT | Mod: CPTII,95,, | Performed by: INTERNAL MEDICINE

## 2024-09-25 PROCEDURE — 3008F BODY MASS INDEX DOCD: CPT | Mod: CPTII,95,, | Performed by: INTERNAL MEDICINE

## 2024-09-25 PROCEDURE — 3288F FALL RISK ASSESSMENT DOCD: CPT | Mod: CPTII,95,, | Performed by: INTERNAL MEDICINE

## 2024-09-25 PROCEDURE — 99215 OFFICE O/P EST HI 40 MIN: CPT | Mod: 95,,, | Performed by: INTERNAL MEDICINE

## 2024-09-25 PROCEDURE — 1101F PT FALLS ASSESS-DOCD LE1/YR: CPT | Mod: CPTII,95,, | Performed by: INTERNAL MEDICINE

## 2024-09-25 PROCEDURE — 1160F RVW MEDS BY RX/DR IN RCRD: CPT | Mod: CPTII,95,, | Performed by: INTERNAL MEDICINE

## 2024-09-25 PROCEDURE — 1159F MED LIST DOCD IN RCRD: CPT | Mod: CPTII,95,, | Performed by: INTERNAL MEDICINE

## 2024-09-25 PROCEDURE — G2211 COMPLEX E/M VISIT ADD ON: HCPCS | Mod: 95,,, | Performed by: INTERNAL MEDICINE

## 2024-09-25 PROCEDURE — 4010F ACE/ARB THERAPY RXD/TAKEN: CPT | Mod: CPTII,95,, | Performed by: INTERNAL MEDICINE

## 2024-09-25 RX ORDER — LOSARTAN POTASSIUM 100 MG/1
100 TABLET ORAL DAILY
COMMUNITY

## 2024-09-25 NOTE — Clinical Note
Coordinate labs same day as scope- CBC, CMP, TB gold, HBsAg, HBcAg, vit B12 and f/u on same day as scope Place orders for scope - EGD/colonoscopy with Dr. South 2[facility]2025[facility] and Crohn's disease with anal stricture - message sent for RN to put in orders

## 2024-09-25 NOTE — PROGRESS NOTES
[facility]             Inflammatory Bowel Disease   Follow-up  Note              TODAY'S VISIT DATE:  9/25/2024    Chief Complaint:   Chief Complaint   Patient presents with    Crohn's Disease     PCP: Radha Engle    Previous History:  Aline Fuller is a 64 y.o.  female with Crohn's disease S/P surgery with anal stricture and anastomotic/ileum stricture (h/o perianal and buttock fistulas with abscess with severe anal stricture dilation, S/P multiple surgeries including partial colectomy in 1973, 1979 and 1997),  history of breast cancer (dxed 4/2014, lumpectomy 5/2014, chemo/XRT and then tamoxifen), GERD with esophageal stricture.  She initially started with symptoms of abdominal pain, weight loss, and diarrhea in 1972 and had an ex lap in 1973 with 6 inches of colon removed that was non-diagnostic for CD.  She was not diagnosed until 1979 after her second ex lap with 6 inches of small bowel removed.  She was initially treated with several courses of prednisone.  Early in her diagnosis, she took sulfasalazine though it was discontinued due to a rash, dipentum though it was not effective long term, and imuran though it had to be discontinued due to bone marrow suppression.  Her symptoms progressed which resulted in an SBO that prompted her third ex lap in 1997 that resulted in 6 inches of small bowel being ressected.  She was in clinic remission and on no medications from 1997 to 2001 at which time she recalls having a fistula and and abscess that required I&D and ABX.  She had recurrence of fistula and abscess in 2004 or 2005.  She also took asacol, colazal, 6MP, and imuran that were all ineffective though possible anemia with 6MP/Imuran.  In 2008 she started humira with induction and maintenance but discontinued after 3 months because patient broke out with a rash though she also felt it was ineffective.   She established care with Dr. Polo in 2010 and had a colonoscopy 10/2010  which showed fistulas, anal stricture dilated, and active inflammation at the ileocolonic anastomosis that could not be traversed.  In 12/2010 she had perianal Crohn's disease that was treated with dilation of rectal stricture and seton placement.  She started remicade 2/2011.  MRI 4/2011 showed multiple perianal fistulas with seton removal in approximately 5/2011.  At some time in 2012, her remicade dose frequency was increased to 5 mg/kg to every 6 weeks.  She was diagnosed with stage 1 breast cancer 4/2014  that was treated with lumpectomy 5/2014 followed by chemo/XRT with tamoxifen. Since 2012 she continues on remicade 5 mg/kg every 6 weeks though it was held during chemotherapy from 6/2014-4/2015 and restarted with induction and maintenance of 5 mg/kg every 6 weeks.   Colonoscopy 10/2016 showed continued anal stricture with inflammation throughout the colon and neoterminal ileum could not be intubated.  In 2/2017 Garcia trough IFX levels/abs were undetectable with no ABs.  In spring 2017 remicade was optimized to 10 mg/kg every 6 weeks. CTE 2/27/17 showed active CD involving the descending and sigmoid colon and rectum.  Decreased caliber at the ileocolic anastomosis with possible mucosal hyperenhancement.  Note is made of focal decreased caliber within the rectum, possibly transient.  Multiple perianal fistulas without evidence for abscess; heterogeneous enhancement within the uterus.  Appearance is not certainly secondary to leiomyomas.  Given postmenopausal age group, recommend further evaluation with pelvic ultrasound to assess endometrium.   She was last seen in our clinic 8/2017 at which time we recommended yearly SB imaging with MRE and anal stricture dilation of constipation returns by Dr. Mcclure with EUA though o/w plans to dilate at time of colonoscopy yearly.  MRE 6/25/18 showed some suspected luminal narrowing to approximately 5 mm at level of the ileocolic anastomosis RIGHT lower quadrant without  evidence for upstream dilatation of bowel questionable minimal hyper enhancement without evidence for mesenteric edema. In 12/2019 she had EGD and colonoscopy with EGD showing diffuse esohageal candiasis and dilation of esophageal stricture though not fully dilated due to risk of perforation with bleeding at time of dilation initially. In 4/2019 she had EUA with Dr. Aparicio with anal biopsies showing no dysplasia.   In 12/2019 patient had colonoscopy which showed buttock fistula opening that is closed and scarring and hypopigmentation of the skin. Severe anal stenosis and large perianal skin tags. Stricture dilated by KARON. The remainder of the colon was normal though scope aborted in transverse colon due to significant fixed looping. After her scopes 12/2019 pt was upset b/c she did not recall plan of care outlined after procedures so I called and explained that with this tight anal stricture we feel surgery with colostomy was ideal given risk of cancer and obstruction though she did not wish to make a f/u appt with CRS at that time. Due to esophageal candidiasis and immunosuppression she was given a 3 week course of diflucan.  At that time she continued follow up with our recommendations and repeat EGD with Dr. Polo for esophageal stricture dilation after candidiasis treatment. Repeat EGD 1/29/19 showed a severe esophageal stricture of lower third of the esophagus with dilator passed through the scope and on reinsertion moderate mucosal disruption with normal stomach and duodenum. On 12/14/20 patient had dilation of esophageal stricture again up to 12 mm with normal stomach and duodenum. Colonoscopy 12/2020 showed perianal skin tags with severe anal stenosis, prior end to side ileocolonic anastomosis of the transverse colon which could not be traversed with normal colon. Biopsies of transverse/descending/sigmoid normal, biopsies of rectum with focal bifid crypt architectural distortion. Per patient Dr. Aparicio was  present and dilated stricture during the scope with Dr. Polo. She continues on remicade 10 mg/kg every 6 weeks (([id number]--reinitiated 2016 with induction and then every 6 weeks maintenance, 10 mg/kg q 6 weeks spring 2017) and she has 1-3 BMs/day, loose to soft, no blood and has rare incontinence every few weeks. Once every few weeks she has a nocturnal BM. Often her bowel movements depends on what she ate and what time she ate. She has some dysphagia with dry solid (chicken breast, fish) and if she eats small bites and chews things slowly then overall does okay. Every few weeks has no bowel movements with bloating and difficulty evacuating which lasts for 2-3 days and occurs every 2-3 days and then once constipation resolves she then has diarrhea.  She drinks a lot of water and occasionally a fleets enemas. MRE 6/2021 around the area of anastomosis there is tethering and fibrotic bands with multiple tortuous SB loops and luminal narrowing at the anastomosis without significant upstream dilatation, cholecystectomy with mild CBD prominence.  On 1/3/2022 trough infliximab level 15/antibodies negative on inflectra 10 mg/kg q 4 weeks.   EGD/colonoscopy deferred from 12/2021 to 3/2022.  In 3/2022 EGD and colonoscopy (performed by Dr. Polo who was previously taking care of her) which was significant for benign appearing esophageal stenosis at GEJ dilated to 10 mm and colonoscopy 3/2022 showed hypertrophied skin tags, benign appearing anal stenosis traversed after digital dilation, severe ileocolonic anastomotic stricture not traversed with solitary apthous ulcer at the anastamosis with remainder of colon normal. Biopsies of ascending/transverse/descending/sigmoid and rectum normal. In 5/2021 her trough IFX level 3.3 and in 11/2021 her inflectra was optimized to 10 mg/kg q 4 weeks from q 6 weeks followed by repeat trough IFX levels 1/3/22 15/Abs neg. In 10/2022 she had upper abd pain, diarrhea and nocturnal stooling  and imodium with remicade helped. MRE 5/2022 c/w significant distal ileal stricture at the anastomosis extending 5-6 cm with no upstream dilation, left perianal and gluteal soft tissue enhancement likely related to scarring and healing from prior perianal fistulas. She had dysphagia so we referred her for urgent EGD. Patient started with eczema 10/2022 and she required prednisone for short time and then has had good control with tacrolimus ointment and kenalog cream.  In 4/2023 patient had MRE which shows known Crohn's disease and prominent luminal narrowing at the level of the ileocolonic anastomosis and lesser extent of the distal ileum with no upstream dilation, stable left fistula tract. In 2/2024 EGD significant for severe esophageal stricture that was balloon dilated and pt's dysphagia resolved. Colonoscopy 2/2024 significant for anal stricture that was dilated and mild ileocolonic anastomotic stricture not traversed due to significant looping but limited view of distal ileum appeared normal though bx of ileum with mild ileitis.  Pt also had anemia but not ROD and receiving epo injections by hepatology.  It was recommended due to esophageal stricture she have repeat EGD 3/2024 but pt wished to defer until 8/2024 and plan was to coordinate repeat EGD, MRE and labs.     Interval History:  - current IBD meds: inflectra 10 mg/kg q 4 weeks (10 mg/kg q 4 weeks from q 6 weeks started 11/2021, LD 9/16, ND 10/14)  - eczema treatment- prednisone prn, continues tacrolimus ointment/kenalog cream and approved for dupixent and has appt with derm and not sure if she will be starting   - 1-2 soft BMs/d, no blood, twice a week has nocturnal BM  - no straining or constipation with complete evacuation- drinking lots of water, not having to take any laxatives including MOM  - ongoing anemia - epo injections q 3 weeks, last IV iron >5 years ago, blood count stabilizing and improving  - dysphagia resolved after EGD with dilation,  continues to do well with no recurrent symptoms and feels also that protonix helps  - 7/2024 MRE: 1-2 cm long segment of fixed luminal narrowing involving the neoterminal ileum at anastomosis which is stable with no upstream SB dilation, stable bandlike regions of dark T2 signal and enhancement in the perianal region likely scarring, cholecystectomy  - 9/20/24 labs significant for elevated kidney function- pt assured me followed closely by hematology 10/1/24  - ALP and GGT- pt labs sent to PCP and told pt to follow up on kidney function tests and prefers MRCP to be done locally- to evaluate LFT abnormality and consider PSC  - NSAID use: No  - Narcotic use: No  - Alternative/complementary meds for IBD: No    Previous Surgeries:  1973 large bowel resection  1979 further colonic resection  12/17/2010: EUA, dilation of rectal stricture and placement of setons Xs 4    Last pertinent Endoscopy/Imaging:  3/17/22 EGD (Dr. Jamie Polo): benign appearing esophageal stenosis at GE junction dilated to 10 mm  2/2024 EGD:  One benign-appearing, intrinsic severe stenosis was found 38 cm from the incisors. This stenosis measured 7 mm (inner diameter) x less than one cm (in length). The stenosis was traversed after dilation.  A TTS dilator was passed through the scope. Dilation with a 10-11-12 mm balloon dilator was performed to 12 mm. The dilation site was examined and showed moderate mucosal disruption and moderate improvement in luminal narrowing. The exam of the esophagus was otherwise normal. The cardia and gastric fundus are normal on retroflexion, stomach is normal, duodenum is normal  2/2024 colonoscopy:  anal stricture dilated with gentle digital pressure, prior end to side ileocolonic anastomosis in ascending colon and patent mild stenosis with anastomosis not traversed due to loopin int he colon rathan than from the stenosis, distal ileum visualized from the colon is normal. Biopsies TI mild active ileitis,  ascending/transverse/descending/sigmoid. Biopsies of TI with mild active ileitis, ascending colon/transverse/descending/sigmoid/rectum normal  2024 MRE: 1-2 cm long segment of fixed luminal narrowing involving the neoterminal ileum at anastomosis which is stable with no upstream SB dilation, stable bandlike regions of dark T2 signal and enhancement in the perianal region likely scarring, cholecystectomy    Therapeutic drug monitorin2017 Garcia IFX level <1.0, Ab neg  17 IFX 3.0/Abs neg  10/25/18 IFX 3.0/Abs neg  18 IFX 3.3/Abs neg  21 IFX level 3.3/ABs neg on remicade 10 mg/kg q 6 weeks  1/3/22 trough IFX level 15/Abs neg on inflectra 10 mg/kg q 4 weeks     Prior IBD Therapies:  Prednisone  Sulfasalazine- rash  Dipentum- ineffective  6MP- bone marrow suppression  Flagyl/Cipro- effective   Asacol- ineffective  Colazal- ineffective  Imuran  Humira-rash-  or     Vaccinations:  Lab Results   Component Value Date    HEPBSAB Negative 2017     Lab Results   Component Value Date    HEPBSURFABQU Negative 2021    HEPBSURFABQU <3 2021     Lab Results   Component Value Date    HEPAIGG Negative 2021     Lab Results   Component Value Date    VARICELLAZOS 1.86 (H) 2021    VARICELLAINT Positive (A) 2021     Lab Results   Component Value Date    MUMPSIGGSCRE 2.08 (H) 2021    MUMPSIGGINTE Positive (A) 2021      Lab Results   Component Value Date    RUBEOLAIGGAN 2.80 (H) 2021    RUBEOLAINTER Positive (A) 2021     Immunization History   Administered Date(s) Administered    COVID-19, MRNA, LN-S, PF (Pfizer) (Purple Cap) 2021, 2021, 10/31/2021    COVID-19, mRNA, LNP-S, PF, portia-sucrose, 30 mcg/0.3 mL (Pfizer  Ages 12+) 2023    Hepatitis A / Hepatitis B 2011, 2011, 2011    Influenza (FLUAD) - Quadrivalent - Adjuvanted - PF *Preferred* (65+) 10/16/2023    Influenza - Quadrivalent - MDCK - PF 10/16/2022     Influenza - Quadrivalent - PF *Preferred* (6 months and older) 09/18/2018, 10/28/2019, 11/04/2020, 11/10/2021    Influenza - Trivalent - Fluzone High Dose - PF (65 years and older) 10/28/2019    Pneumococcal Conjugate - 13 Valent 08/26/2015, 12/12/2016    Pneumococcal Polysaccharide - 23 Valent 05/03/2021    Tdap 04/27/2009    Zoster Recombinant 12/06/2023   Flu shot: recommended yearly, will get locally  COVID vaccine/booster: per CDC guidelines, recommended, will get locally  RSV: recommended and will get locally   Tetanus (TdaP): was due 4/2019, reminded to get this done  HPV:    NA  Meningococcal: NA  Hepatitis B: will be getting this   Hepatitis A:  hep A # 1 9/10/24, advised timing of 2nd hep A  Shingrix: shingrix #2 8/7/24    Review of Systems   Constitutional:  Negative for chills, fever and weight loss.   HENT:          No oral ulcers, dysphagia, oral thrush   Eyes:  Negative for blurred vision, pain and redness.   Respiratory:  Negative for cough and shortness of breath.    Cardiovascular:  Negative for chest pain.   Gastrointestinal:  Negative for abdominal pain, heartburn, nausea and vomiting.   Genitourinary:  Negative for dysuria and hematuria.   Musculoskeletal:  Negative for back pain and joint pain.   Skin:  Negative for rash.   Psychiatric/Behavioral:  Negative for depression. The patient is not nervous/anxious and does not have insomnia.       All Medical History/Surgical History/Family History/Social History/Allergies have been reviewed and updated in EMR    Outpatient Medications Marked as Taking for the 9/25/24 encounter (Office Visit) with Diamond Barbour MD   Medication Sig Dispense Refill    cholecalciferol, vitamin D3, (VITAMIN D3) 250 mcg (10,000 unit) Cap capsule Take by mouth once daily.      cyanocobalamin 1,000 mcg/mL injection 1,000 mcg every 30 days.   1    EPOETIN BRIAN-EPBX INJ Inject as directed as needed (anemia).      fluticasone propionate (FLONASE) 50 mcg/actuation nasal  "spray 1 spray by Each Nostril route once daily.      inFLIXimab-dyyb (INFLECTRA) 100 mg injection Infuse 10 mg/kg into the vein every 4 weeks 7 each 3    pantoprazole (PROTONIX) 40 MG tablet Take 1 tablet (40 mg total) by mouth once daily. 30 tablet 11    triamcinolone acetonide 0.1% (KENALOG) 0.1 % ointment Apply 1 application topically 2 (two) times daily.       Vital Signs:  Ht 5' 6" (1.676 m)   Wt 75.8 kg (167 lb)   BMI 26.95 kg/m²    Physical Exam     Labs:   Lab Results   Component Value Date    CRP 3.5 05/03/2021     Lab Results   Component Value Date    HEPBSAG Non-reactive 02/20/2024    HEPBCAB Non-reactive 02/20/2024     Lab Results   Component Value Date    TBGOLDPLUS Negative 02/20/2024     Lab Results   Component Value Date    LNHPVHUV85WT 41 05/02/2022    NGMFOTYC85 913 02/20/2024     Lab Results   Component Value Date    WBC 7.9 09/05/2024    HGB 10.6 (L) 09/05/2024    HCT 33.9 (L) 09/05/2024    MCV 90 09/05/2024     09/05/2024     Lab Results   Component Value Date    CREATININE 1.34 (H) 09/20/2024    ALBUMIN 4.1 09/20/2024    BILITOT 0.3 09/20/2024    ALKPHOS 104 02/20/2024    AST 23 09/20/2024    ALT 28 09/20/2024    GGT 65 (H) 09/20/2024     Assessment/Plan:  Aline Fuller is a 64 y.o. female with Crohn's disease S/P surgery with anal stricture and anastomotic/ileum stricture (h/o perianal and buttock fistulas with abscess with severe anal stricture dilation, S/P multiple surgeries including partial colectomy in 1973, 1979 and 1997),  history of breast cancer (dxed 4/2014, lumpectomy 5/2014, chemo/XRT and then tamoxifen), GERD with esophageal stricture.     She continues on inflectra 10 mg/kg q 4 weeks. She has had no recurrent constipation symptoms and drinking water has helped tremendously with this and requiring no additional laxatives.  She has not followed up with repeat EGD given no recurrent dysphagia though it was recommended to do a sooner EGD on 2 separate occasions. She will " let us know if she has any recurrent symptoms. Otherwise we will plan on EGD/colonoscopy 2/2025 with same day labs and clinic appt. Last labs showed slightly elevated LFTs- ALP and GGT so we will get MRCP done locally per pt request but recent MRE did not show any evidence of PSC.     # Crohn's disease S/P surgery with anal stricture and ileocolonic anastomotic stricture (ileum, colon, h/o perianal and buttock fistulas with abscess with severe anal stricture dilation, S/P multiple surgeries including partial colectomy in 1973, 1979 and 1997)  - note:  history of breast cancer (dxed 4/2014, lumpectomy 5/2014, chemo/XRT and then tamoxifen)  - note: eczema- worse since switching from remicade to inflectra, may have to consider switch to brand name if eczema does not improve  - continue inflectra 10 mg/kg q 4 weeks  - anal stricture- dilated 2/2024, for constipation sx- uses prn saline enemas and dulcolax  - anastomotic/SB stricture and tethering- MRE 7/2024 stable, will repeat yearly    - continue low residue diet and chewing food well   - colonoscopy 2/2025- sooner if any anal stricture sx  - vitamin B12- continue vit B12 1000 mcg IM q 4 weeks, repeat B12 level 2/2025  - drug monitoring labs: CBC/CMP q 6 mos (2/2025), TPMT (1/2017 TPMT 32.6), TB quantiferon (2/2025), Hep B testing (2/2025)  - TDM: trough remicade levels/abs 10/14/24 at [facility] prior to infusion    # Abnormal LFTs (elevated ALP and GGT)  - pt will get this monitored by hematologist who does bloodwork   - repeat 2/2025  - MRCP to be done locally and pt will make sure I get results, of note MRE 7/2024 did not show any evidence of PSC    # Distal esophageal stricture with GERD  - dysphagia- resolved after dilation of stricture 2/2024  - repeat recommended 3/2024 and then pt deferred to 8/2024 and did not schedule due to no active symptoms and prefers to defer until 2/2025 at time of colonoscopy  - instructed to let us know if any recurrent symptoms     #  Immunodeficiency due to long term immunosuppressive drug therapy and  IBD specific health maintenance:  CRC risk- sx 1972, >1/3 colon, surveillance colonoscopy q 1-2 years  Skin exam yearly- normal 11/2023, reminded to get one 11/2024, outside dermatologist- Dr. Shapley  Osteopenia- postmenopausal, had DEXA 3/2024, weight bearing exercise, can't swallow calcium  Pap smear yearly- normal 3/2024  Vitamin D- continue vit D 3 5000 IU/d, repeat vit D with next labs   Vaccines: no live vaccines, locally will get hep A #2, hep B series, flu shot/RSV and covid also recommended and pt to get done locally    Visit today is associated with current or anticipated ongoing medical care related to this patient's single serious condition/complex condition Crohn's disease.     The patient location is: Home  The chief complaint leading to consultation is: Crohn's disease     Visit type: audiovisual    Face to Face time with patient: 25minutes  40 minutes of total time spent on the encounter, which includes face to face time and non-face to face time preparing to see the patient (eg, review of tests), Obtaining and/or reviewing separately obtained history, Documenting clinical information in the electronic or other health record, Independently interpreting results (not separately reported) and communicating results to the patient/family/caregiver, or Care coordination (not separately reported).     Each patient to whom he or she provides medical services by telemedicine is:  (1) informed of the relationship between the physician and patient and the respective role of any other health care provider with respect to management of the patient; and (2) notified that he or she may decline to receive medical services by telemedicine and may withdraw from such care at any time.    Diamond Barbour MD  Department of Gastroenterology  Medical Director, Inflammatory Bowel Disease

## 2024-09-25 NOTE — PATIENT INSTRUCTIONS
- continue low residue diet and chewing food well   - MRCP done locally and make sure I get results- our fax #[phone]  - EGD/colonoscopy 2/2025 with same day labs and f/u   - infliximab levels 10/14/24 at [facility]  - skin exam yearly

## 2024-09-25 NOTE — Clinical Note
Place orders and track - EGD/colonoscopy with Dr. South 2/2025- golytely indication esophageal stricture and Crohn's disease with anal stricture - message sent for RN to put in orders

## 2024-10-23 ENCOUNTER — PATIENT MESSAGE (OUTPATIENT)
Dept: GASTROENTEROLOGY | Facility: CLINIC | Age: 64
End: 2024-10-23
Payer: COMMERCIAL

## 2024-10-23 ENCOUNTER — TELEPHONE (OUTPATIENT)
Dept: GASTROENTEROLOGY | Facility: CLINIC | Age: 64
End: 2024-10-23
Payer: COMMERCIAL

## 2024-10-23 NOTE — TELEPHONE ENCOUNTER
----- Message from Molly sent at 10/23/2024  2:46 PM CDT -----  Regarding: infor  Contact: @ [phone]  Pt called in regards to speaking with someone about getting more information needed for a MRI she is trying to get done at another [facility] .....Please call and adv @ [phone]

## 2024-10-23 NOTE — TELEPHONE ENCOUNTER
Spoke with [facility].   Patient aware she has been scheduled for her MRI on Friday 11/15 at 7:45.   She is aware she will need to fast after midnight.   Cuca

## 2024-11-14 ENCOUNTER — TELEPHONE (OUTPATIENT)
Dept: ENDOSCOPY | Facility: HOSPITAL | Age: 64
End: 2024-11-14
Payer: COMMERCIAL

## 2024-11-14 NOTE — TELEPHONE ENCOUNTER
"----- Message from Karoline sent at 2024  9:12 AM CST -----    ----- Message -----  From: Kacey Barksdale RN  Sent: 2024  12:00 AM CST  To: [facility] Staff; #    Procedure: EGD/Colonoscopy    Diagnosis: Crohn's disease w/ anal stricture & esophageal stricture     Procedure Timin2025; let team know once scheduled to coordinate same day labs (CBC, CMP, TB gold, HBsAg, HBcAg, vit B12) & f/u appt w/ Dr. Barbour    #If within 4 weeks selected, please herb as high priority#    #If greater than 12 weeks, please select "5-12 weeks" and delay sending until 3 months prior to requested date#     Provider: Dr. South    Location: Any Site    Additional Scheduling Information: No scheduling concerns    Prep Specifications:Wes     Is the patient taking a GLP-1 Agonist:no    Have you attached a patient to this message: yes  "

## 2024-11-14 NOTE — TELEPHONE ENCOUNTER
Contacted the patient to schedule an endoscopy procedure(s) EGD/Colonoscopy. The patient did not answer the call and left a voice message requesting a call back.

## 2024-11-15 ENCOUNTER — TELEPHONE (OUTPATIENT)
Dept: GASTROENTEROLOGY | Facility: CLINIC | Age: 64
End: 2024-11-15
Payer: COMMERCIAL

## 2024-11-15 NOTE — TELEPHONE ENCOUNTER
----- Message from CAMILLA Tsang sent at 11/12/2024  4:44 PM CST -----  Regarding: FW: PHARMACY AUTH  Contact: [phone]-    ----- Message -----  From: Luz Robles  Sent: 11/12/2024   4:13 PM CST  To: [facility] Staff  Subject: PHARMACY AUTH                                    Rx Refill/Request     Is this a Refill or New Rx:  REFILL      Rx Name and Strength:  inFLIXimab-dyyb (INFLECTRA) 100 mg injection     Preferred Pharmacy with phone number:   [facility] - [place], [place] - [address], [address]  [facility], [place] [address]  [place] [address]  Phone: [phone] Fax: [phone]        Communication Preference: [phone]     Additional Information:   REQUESTING A PA FOR RX   inFLIXimab-dyyb (INFLECTRA) 100 mg injection    TAX# [id number]    [phone]    AUTH #[id number]

## 2024-11-15 NOTE — TELEPHONE ENCOUNTER
Spoke with Caroline   - Dispensing pharmacy for Inflectra vials is changing to [place] in [place].   - Carla in [place] needs MDO to reach out to [facility] to request auth to be updated to reflect  [place] so this pharmacy can move forward with dispensing.  - [facility] #[phone]  TAX# [phone]   AUTH #[id number]    Pt will receive the same nursing serives, dispensing pharmacy is just being updated.

## 2024-11-20 ENCOUNTER — TELEPHONE (OUTPATIENT)
Dept: ENDOSCOPY | Facility: HOSPITAL | Age: 64
End: 2024-11-20
Payer: COMMERCIAL

## 2024-11-20 DIAGNOSIS — K50.919 CROHN'S DISEASE WITH COMPLICATION, UNSPECIFIED GASTROINTESTINAL TRACT LOCATION: Primary | ICD-10-CM

## 2024-11-20 DIAGNOSIS — K62.4 ANAL STRICTURE: ICD-10-CM

## 2024-11-20 DIAGNOSIS — K22.2 ESOPHAGEAL STRICTURE: ICD-10-CM

## 2024-11-20 DIAGNOSIS — Z12.11 ENCOUNTER FOR SCREENING COLONOSCOPY FOR NON-HIGH-RISK PATIENT: Primary | ICD-10-CM

## 2024-11-20 RX ORDER — POLYETHYLENE GLYCOL 3350, SODIUM SULFATE ANHYDROUS, SODIUM BICARBONATE, SODIUM CHLORIDE, POTASSIUM CHLORIDE 236; 22.74; 6.74; 5.86; 2.97 G/4L; G/4L; G/4L; G/4L; G/4L
4 POWDER, FOR SOLUTION ORAL ONCE
Qty: 4000 ML | Refills: 0 | Status: SHIPPED | OUTPATIENT
Start: 2024-11-20 | End: 2024-11-20

## 2024-11-20 NOTE — TELEPHONE ENCOUNTER
"Referral for procedure from [facility]      Spoke to pt to schedule procedure(s) Colonoscopy/EGD       Physician to perform procedure(s) Dr. ISABELA South  Date of Procedure (s) 25  Arrival Time 9:30 AM  Time of Procedure(s) 10:30 AM   Location of Procedure(s) [place] 4th Floor  Type of Rx Prep sent to patient: PEG  Instructions provided to patient via [facility]    Patient was informed on the following information and verbalized understanding. Screening questionnaire reviewed with patient and complete. If procedure requires anesthesia, a responsible adult needs to be present to accompany the patient home, patient cannot drive after receiving anesthesia. Appointment details are tentative, especially check-in time. Patient will receive a prep-op call 7 days prior to confirm check-in time for procedure. If applicable the patient should contact their pharmacy to verify Rx for procedure prep is ready for pick-up. Patient was advised to call the scheduling department at [phone] if pharmacy states no Rx is available. Patient was advised to call the endoscopy scheduling department if any questions or concerns arise.       Endoscopy Scheduling Department     ---- Message from Karoline sent at 2024  9:12 AM CST -----     ----- Message -----  From: Kacey Barksdale, RN  Sent: 2024  12:00 AM CST  To: [facility] Staff; #     Procedure: EGD/Colonoscopy     Diagnosis: Crohn's disease w/ anal stricture & esophageal stricture      Procedure Timin2025; let team know once scheduled to coordinate same day labs (CBC, CMP, TB gold, HBsAg, HBcAg, vit B12) & f/u appt w/ Dr. Barbour     #If within 4 weeks selected, please herb as high priority#     #If greater than 12 weeks, please select "5-12 weeks" and delay sending until 3 months prior to requested date#      Provider: Dr. South     Location: Any Site     Additional Scheduling Information: No scheduling concerns     Prep Specifications:Wes      Is the " patient taking a GLP-1 Agonist:no     Have you attached a patient to this message: yes

## 2024-11-21 ENCOUNTER — TELEPHONE (OUTPATIENT)
Dept: GASTROENTEROLOGY | Facility: CLINIC | Age: 64
End: 2024-11-21
Payer: COMMERCIAL

## 2024-11-21 DIAGNOSIS — K50.818 CROHN'S DISEASE OF BOTH SMALL AND LARGE INTESTINE WITH OTHER COMPLICATION: Primary | ICD-10-CM

## 2024-11-21 DIAGNOSIS — K83.9 BILE DUCT ABNORMALITY: ICD-10-CM

## 2024-11-21 NOTE — TELEPHONE ENCOUNTER
Spoke with patient in regards to hepatology referral. Referral order placed and appointment scheduled 1/22/25 at 2PM for patient.

## 2024-11-21 NOTE — TELEPHONE ENCOUNTER
----- Message from Diamond Barbour MD sent at 11/20/2024  3:24 PM CST -----  Call pt and review MRCP results that show some bile duct abnormalities. Given the elevation of her ALP and GGT (liver tests) and these findings I would recommend she see hepatology.  If she is willing to come here for that visit please refer and arrange    Thanks  SS  ----- Message -----  From: Tay, Rad Results In  Sent: 11/20/2024   9:32 AM CST  To: Diamond Barbour MD

## 2024-11-26 ENCOUNTER — TELEPHONE (OUTPATIENT)
Dept: GASTROENTEROLOGY | Facility: CLINIC | Age: 64
End: 2024-11-26
Payer: COMMERCIAL

## 2024-11-26 NOTE — TELEPHONE ENCOUNTER
Spoke with Aline:  - advised if she wants same day OV with scopes, needs earlier am appt to accommodate  - may have done by Dr. Polo   - she will discuss with her sister (regan) to see if she could do an earlier scope or schedule OV  - she states she will send us a message 12/2 after this holiday week to let us know

## 2024-11-26 NOTE — TELEPHONE ENCOUNTER
----- Message from Diamond Barbour MD sent at 11/25/2024 11:37 AM CST -----  I would just nicely explain to her this is the only option o/w we would have to separate procedure from clinic appt    Another options if she wants to do with Dr. Polo at [facility] if that opens up option for her. He knows her well    SS  ----- Message -----  From: Trinh Cardoso RN  Sent: 11/25/2024   8:43 AM CST  To: MD Maria L Funez said this is the only day/time pt agreed to. Should I call her and explain the situation? Or leave as is?    Trinh  ----- Message -----  From: Diamond Barbour MD  Sent: 11/21/2024  10:19 AM CST  To: Trinh Cardoso RN    Any chance that [facility] has earlier AM availability so I can overbook in the morning rather than afternoon.      SS  ----- Message -----  From: Trinh Cardoso RN  Sent: 11/21/2024   7:22 AM CST  To: Diamond Barbour MD    Pt scheduled Fri 2/7 for EGD w [facility]. It's noted she needs same day office visit. This is a Friday. Do you want me to schedule her for sometime in the afternoon?? Or schedule on a different day? Or have Maria L change scope date to accommodate office visit?

## 2024-12-10 ENCOUNTER — TELEPHONE (OUTPATIENT)
Dept: GASTROENTEROLOGY | Facility: CLINIC | Age: 64
End: 2024-12-10
Payer: COMMERCIAL

## 2024-12-10 NOTE — TELEPHONE ENCOUNTER
----- Message from CAMILLA Tsang sent at 12/10/2024 11:03 AM CST -----  Regarding: FW: auth    ----- Message -----  From: Gurmeet Werner  Sent: 12/10/2024   9:58 AM CST  To: [facility] Staff  Subject: auth                                             Pharmacy Calling     Reason for call: PHREC RX: prior authorization     [facility]:     [facility] - [place], [place] - [address], [address]  [address], [place] [address]  [place] [address]  Phone: [phone] Fax: [phone]         Prescription Name:      inFLIXimab-dyyb (INFLECTRA) 100 mg injection      Additional Information: Call Leo [phone] and Needs the auth [id number] to be tranfered to [facility].   tax number: [id number]  Npi: [id number]  Requested auth on11/12 to [facility] but needs to be sent to [place] in [place].

## 2024-12-11 ENCOUNTER — TELEPHONE (OUTPATIENT)
Dept: GASTROENTEROLOGY | Facility: CLINIC | Age: 64
End: 2024-12-11
Payer: COMMERCIAL

## 2024-12-11 DIAGNOSIS — K50.818 CROHN'S DISEASE OF BOTH SMALL AND LARGE INTESTINE WITH OTHER COMPLICATION: Primary | ICD-10-CM

## 2024-12-11 NOTE — TELEPHONE ENCOUNTER
"----- Message from CAMILLA Erazo sent at 2024  9:41 AM CDT -----  Procedure: EGD/Colonoscopy    Diagnosis: Crohn's disease w/ anal stricture & esophageal stricture     Procedure Timin2025; let team know once scheduled to coordinate same day labs (CBC, CMP, TB gold, HBsAg, HBcAg, vit B12) & f/u appt w/ Dr. Barbour    #If within 4 weeks selected, please herb as high priority#    #If greater than 12 weeks, please select "5-12 weeks" and delay sending until 3 months prior to requested date#     Provider: Dr. South    Location: Any Site    Additional Scheduling Information: No scheduling concerns    Prep Specifications:Wes     Is the patient taking a GLP-1 Agonist:no    Have you attached a patient to this message: yes  "

## 2024-12-19 NOTE — TELEPHONE ENCOUNTER
- Called [facility] [phone] rep Santos updated IFX auth to [facility] dispensing pharmacy in [place], [place].     - Per Carla, pt will receive the same nursing serives, IFX dispensing pharmacy is just being updated.

## 2024-12-26 ENCOUNTER — TELEPHONE (OUTPATIENT)
Dept: GASTROENTEROLOGY | Facility: CLINIC | Age: 64
End: 2024-12-26
Payer: COMMERCIAL

## 2024-12-26 NOTE — TELEPHONE ENCOUNTER
----- Message from CAMILLA Erazo sent at 12/26/2024  8:36 AM CST -----  Regarding: FW: Fax confirmation  Contact: [phone] Fax [phone]    ----- Message -----  From: Wendy Pinedo  Sent: 12/26/2024   8:16 AM CST  To: [facility] Staff  Subject: Fax confirmation                                 Type:  Needs Medical Advice    Who Called: Diana from [facility] calling regarding plan of treatment foam for patient that must be signed   [facility]   Would the patient rather a call back or a response via [facility]?   Best Call Back Number: [phone] Fax [phone]  Additional Information:

## 2024-12-26 NOTE — TELEPHONE ENCOUNTER
- Called [facility] ([phone]) rep Ortiz- advised rep that we have not received paperwork.  Rep stated she will re-fax.  Secure fax # provided.

## 2025-01-22 ENCOUNTER — TELEPHONE (OUTPATIENT)
Dept: HEPATOLOGY | Facility: CLINIC | Age: 65
End: 2025-01-22
Payer: COMMERCIAL

## 2025-01-27 ENCOUNTER — TELEPHONE (OUTPATIENT)
Dept: HEPATOLOGY | Facility: CLINIC | Age: 65
End: 2025-01-27
Payer: COMMERCIAL

## 2025-01-27 NOTE — TELEPHONE ENCOUNTER
Patient was called but no answer. Left VM explaining her referral to us by Dr. Barbour and to call back at [phone] to schedule her NP appt.    Sandra Peter MA    ----- Message from Anita Olvera MD sent at 1/27/2025  7:19 AM CST -----  Regarding: RE: Referral question  Yes, appropriate for hepatology. Thank you for checking.  ----- Message -----  From: Sandra Peter MA  Sent: 1/25/2025  12:27 PM CST  To: Anita Olvera MD  Subject: Referral question                                Dr. Olvera, this patient was referred to hepatology by Dr. Barbour for the Dx Crohn's disease of both small and large intestine with other complication [K50.818]; Bile duct abnormality [K83.9]   Just checking that this is a hepatology appt dx?

## 2025-01-30 ENCOUNTER — TELEPHONE (OUTPATIENT)
Dept: ENDOSCOPY | Facility: HOSPITAL | Age: 65
End: 2025-01-30
Payer: COMMERCIAL

## 2025-01-30 VITALS — WEIGHT: 167 LBS | HEIGHT: 66 IN | BODY MASS INDEX: 26.84 KG/M2

## 2025-01-30 NOTE — TELEPHONE ENCOUNTER
Referral for procedure from Telephone call - direct access patient      Spoke to pt to reschedule procedure(s) Colonoscopy/EGD       Physician to perform procedure(s) Dr. ISABELA South  Date of Procedure (s) 2/24/25  Arrival Time 12:20 PM  Time of Procedure(s) 1:20 PM   Location of Procedure(s) [place] 4th Floor  Type of Rx Prep sent to patient: PEG  Instructions provided to patient via [facility]    Patient was informed on the following information and verbalized understanding. Screening questionnaire reviewed with patient and complete. If procedure requires anesthesia, a responsible adult needs to be present to accompany the patient home, patient cannot drive after receiving anesthesia. Appointment details are tentative, especially check-in time. Patient will receive a prep-op call 7 days prior to confirm check-in time for procedure. If applicable the patient should contact their pharmacy to verify Rx for procedure prep is ready for pick-up. Patient was advised to call the scheduling department at [phone] if pharmacy states no Rx is available. Patient was advised to call the endoscopy scheduling department if any questions or concerns arise.      SS Endoscopy Scheduling Department

## 2025-02-03 RX ORDER — PANTOPRAZOLE SODIUM 40 MG/1
40 TABLET, DELAYED RELEASE ORAL
Qty: 90 TABLET | Refills: 3 | Status: SHIPPED | OUTPATIENT
Start: 2025-02-03

## 2025-02-14 ENCOUNTER — PATIENT MESSAGE (OUTPATIENT)
Dept: ENDOSCOPY | Facility: HOSPITAL | Age: 65
End: 2025-02-14
Payer: COMMERCIAL

## 2025-02-24 ENCOUNTER — ANESTHESIA EVENT (OUTPATIENT)
Dept: ENDOSCOPY | Facility: HOSPITAL | Age: 65
End: 2025-02-24
Payer: COMMERCIAL

## 2025-02-24 ENCOUNTER — ANESTHESIA (OUTPATIENT)
Dept: ENDOSCOPY | Facility: HOSPITAL | Age: 65
End: 2025-02-24
Payer: COMMERCIAL

## 2025-02-24 ENCOUNTER — HOSPITAL ENCOUNTER (OUTPATIENT)
Facility: HOSPITAL | Age: 65
Discharge: HOME | End: 2025-02-24
Attending: INTERNAL MEDICINE | Admitting: INTERNAL MEDICINE
Payer: COMMERCIAL

## 2025-02-24 VITALS
RESPIRATION RATE: 16 BRPM | BODY MASS INDEX: 27.17 KG/M2 | WEIGHT: 169.06 LBS | SYSTOLIC BLOOD PRESSURE: 154 MMHG | OXYGEN SATURATION: 100 % | DIASTOLIC BLOOD PRESSURE: 74 MMHG | TEMPERATURE: 98 F | HEIGHT: 66 IN | HEART RATE: 96 BPM

## 2025-02-24 DIAGNOSIS — K50.90 CROHN'S DISEASE: ICD-10-CM

## 2025-02-24 PROCEDURE — 43249 ESOPH EGD DILATION <30 MM: CPT | Performed by: INTERNAL MEDICINE

## 2025-02-24 PROCEDURE — 45380 COLONOSCOPY AND BIOPSY: CPT | Mod: 74 | Performed by: INTERNAL MEDICINE

## 2025-02-24 PROCEDURE — 63600175 PHARM REV CODE 636 W HCPCS: Performed by: NURSE ANESTHETIST, CERTIFIED REGISTERED

## 2025-02-24 PROCEDURE — 37000008 HC ANESTHESIA 1ST 15 MINUTES: Performed by: INTERNAL MEDICINE

## 2025-02-24 PROCEDURE — C1726 CATH, BAL DIL, NON-VASCULAR: HCPCS | Performed by: INTERNAL MEDICINE

## 2025-02-24 PROCEDURE — 25000003 PHARM REV CODE 250: Performed by: NURSE ANESTHETIST, CERTIFIED REGISTERED

## 2025-02-24 PROCEDURE — 43249 ESOPH EGD DILATION <30 MM: CPT | Mod: 51,,, | Performed by: INTERNAL MEDICINE

## 2025-02-24 PROCEDURE — 27201012 HC FORCEPS, HOT/COLD, DISP: Performed by: INTERNAL MEDICINE

## 2025-02-24 PROCEDURE — E9220 PRA ENDO ANESTHESIA: HCPCS | Mod: ,,, | Performed by: NURSE ANESTHETIST, CERTIFIED REGISTERED

## 2025-02-24 PROCEDURE — 88305 TISSUE EXAM BY PATHOLOGIST: CPT | Mod: 26,,, | Performed by: PATHOLOGY

## 2025-02-24 PROCEDURE — 88305 TISSUE EXAM BY PATHOLOGIST: CPT | Performed by: PATHOLOGY

## 2025-02-24 PROCEDURE — 37000009 HC ANESTHESIA EA ADD 15 MINS: Performed by: INTERNAL MEDICINE

## 2025-02-24 PROCEDURE — 45380 COLONOSCOPY AND BIOPSY: CPT | Mod: 52,,, | Performed by: INTERNAL MEDICINE

## 2025-02-24 RX ORDER — PHENYLEPHRINE HYDROCHLORIDE 10 MG/ML
INJECTION INTRAVENOUS
Status: DISCONTINUED | OUTPATIENT
Start: 2025-02-24 | End: 2025-02-24

## 2025-02-24 RX ORDER — LIDOCAINE HYDROCHLORIDE 20 MG/ML
INJECTION INTRAVENOUS
Status: DISCONTINUED | OUTPATIENT
Start: 2025-02-24 | End: 2025-02-24

## 2025-02-24 RX ORDER — PROPOFOL 10 MG/ML
VIAL (ML) INTRAVENOUS
Status: DISCONTINUED | OUTPATIENT
Start: 2025-02-24 | End: 2025-02-24

## 2025-02-24 RX ORDER — SODIUM CHLORIDE 9 MG/ML
INJECTION, SOLUTION INTRAVENOUS CONTINUOUS
Status: DISCONTINUED | OUTPATIENT
Start: 2025-02-24 | End: 2025-02-24 | Stop reason: HOSPADM

## 2025-02-24 RX ADMIN — PHENYLEPHRINE HYDROCHLORIDE 200 MCG: 10 INJECTION INTRAVENOUS at 01:02

## 2025-02-24 RX ADMIN — PROPOFOL 200 MCG/KG/MIN: 10 INJECTION, EMULSION INTRAVENOUS at 01:02

## 2025-02-24 RX ADMIN — LIDOCAINE HYDROCHLORIDE 100 MG: 20 INJECTION INTRAVENOUS at 01:02

## 2025-02-24 RX ADMIN — PHENYLEPHRINE HYDROCHLORIDE 200 MCG: 10 INJECTION INTRAVENOUS at 02:02

## 2025-02-24 RX ADMIN — SODIUM CHLORIDE: 9 INJECTION, SOLUTION INTRAVENOUS at 01:02

## 2025-02-24 RX ADMIN — PROPOFOL 100 MG: 10 INJECTION, EMULSION INTRAVENOUS at 01:02

## 2025-02-24 NOTE — TRANSFER OF CARE
"Anesthesia Transfer of Care Note    Patient: Aline Fuller    Procedure(s) Performed: Procedure(s) (LRB):  EGD (ESOPHAGOGASTRODUODENOSCOPY) (N/A)  COLONOSCOPY (N/A)    Patient location: GI    Anesthesia Type: general    Transport from OR: Transported from OR on room air with adequate spontaneous ventilation    Post pain: adequate analgesia    Post assessment: no apparent anesthetic complications and tolerated procedure well    Post vital signs: stable    Level of consciousness: awake and alert    Nausea/Vomiting: no nausea/vomiting    Complications: none    Transfer of care protocol was followed      Last vitals: Visit Vitals  /63 (BP Location: Left arm)   Pulse 85   Temp 36.4 °C (97.6 °F) (Temporal)   Resp 16   Ht 5' 6" (1.676 m)   Wt 76.7 kg (169 lb 1.5 oz)   SpO2 96%   Breastfeeding No   BMI 27.29 kg/m²     "

## 2025-02-24 NOTE — ANESTHESIA PREPROCEDURE EVALUATION
[facility]  Anesthesia Pre-Operative Evaluation         Patient Name: Aline Fuller  YOB: 1960  MRN: [id number]    SUBJECTIVE:     Pre-operative evaluation for Procedure(s) (LRB):  EGD (ESOPHAGOGASTRODUODENOSCOPY) (N/A)  COLONOSCOPY (N/A)     02/24/2025    Aline Fuller is a 64 y.o. female w/ a significant PMHx of .    Patient now presents for the above procedure(s).      LDA: None documented.       Prev airway: None documented.    Drips: None documented.      Problem List[1]    Review of patient's allergies indicates:   Allergen Reactions    Amlodipine Rash    Sulfa (sulfonamide antibiotics) Hives     Other reaction(s): Rash       Current Inpatient Medications:      Medications Ordered Prior to Encounter[2]    Past Surgical History:   Procedure Laterality Date    APPENDECTOMY      BIOPSY OF ANUS  4/10/2019    Procedure: BIOPSY, ANUS;  Surgeon: Rhett Aparicio MD;  Location: [facility] OR [address] [place] [address];  Service: Colon and Rectal;;    BREAST LUMPECTOMY Right     CHOLECYSTECTOMY      colon resections      COLON SURGERY  1972 or 1973, 1979    COLONOSCOPY      COLONOSCOPY N/A 10/24/2016    Procedure: COLONOSCOPY;  Surgeon: Jamie Polo MD;  Location: [facility] (4TH FLR);  Service: Endoscopy;  Laterality: N/A;    COLONOSCOPY N/A 8/29/2017    Procedure: COLONOSCOPY;  Surgeon: Diamond Barbour MD;  Location: [facility] (4TH FLR);  Service: Endoscopy;  Laterality: N/A;  schedule as 45 minute case    COLONOSCOPY N/A 10/10/2018    Procedure: COLONOSCOPY;  Surgeon: Diamond Barbour MD;  Location: [facility] (4TH FLR);  Service: Endoscopy;  Laterality: N/A;    COLONOSCOPY N/A 12/19/2019    Procedure: COLONOSCOPY;  Surgeon: Diamond Barbour MD;  Location: [facility] (4TH FLR);  Service: Endoscopy;  Laterality: N/A;    COLONOSCOPY N/A 12/14/2020    Procedure: COLONOSCOPY;  Surgeon: Jamie Polo MD;  Location: [facility] ENDO (4TH FLR);  Service: Endoscopy;  Laterality: N/A;    COLONOSCOPY N/A 3/17/2022     Procedure: Colonoscopy;  Surgeon: Jamie Polo MD;  Location: [facility] ONI (4TH FLR);  Service: Endoscopy;  Laterality: N/A;  Pt. is Fully Vaccinated. 3/16 spoke with pt informed of new arrival time of 12pm-pt verbalized undersanding-tb    COLONOSCOPY N/A 2/20/2024    Procedure: COLONOSCOPY;  Surgeon: Lenard South MD;  Location: [facility] (4TH FLR);  Service: Endoscopy;  Laterality: N/A;  Referred by Dr. SAMMIE Barbour, [facility] 4, No scheduling concerns,  low residue diet, take dulcolax 5 days before and again with bowel prep- golytely, portal -ml  2/9-lvm for precall-[place]  2/14/24- precall complete - ERW    ESOPHAGOGASTRODUODENOSCOPY      ESOPHAGOGASTRODUODENOSCOPY N/A 10/10/2018    Procedure: EGD (ESOPHAGOGASTRODUODENOSCOPY);  Surgeon: Diamond Barboru MD;  Location: [facility] ONI ([address] [place] [address]);  Service: Endoscopy;  Laterality: N/A;    ESOPHAGOGASTRODUODENOSCOPY N/A 12/19/2019    Procedure: EGD (ESOPHAGOGASTRODUODENOSCOPY);  Surgeon: Diamond Barbour MD;  [facility]  Service: Endoscopy;  Laterality: N/A;    ESOPHAGOGASTRODUODENOSCOPY N/A 1/29/2020    Procedure: ESOPHAGOGASTRODUODENOSCOPY (EGD);  Surgeon: Jamie Polo MD;  [facility]  Service: Endoscopy;  Laterality: N/A;  with Dr. Polo last week of January or early February per Dr. Trevino-BB    ESOPHAGOGASTRODUODENOSCOPY N/A 12/14/2020    Procedure: EGD (ESOPHAGOGASTRODUODENOSCOPY);  Surgeon: Jamie Polo MD;  Location: [facility] (4TH FLR);  Service: Endoscopy;  Laterality: N/A;  rapid-3 hours from any testing site-pt requested this time-tb    ESOPHAGOGASTRODUODENOSCOPY N/A 3/17/2022    Procedure: ESOPHAGOGASTRODUODENOSCOPY (EGD);  Surgeon: Jamie Polo MD;  Location: [facility] ENDO ([facility] FLR);  Service: Endoscopy;  Laterality: N/A;  schedule for 60 minute case  Pt requesting Dr. Barbour or Dr. Polo only    ESOPHAGOGASTRODUODENOSCOPY N/A 12/20/2022    Procedure: EGD (ESOPHAGOGASTRODUODENOSCOPY);  Surgeon: Jamie Polo,  MD;  Location: [facility] ENDO (4TH FLR);  Service: Endoscopy;  Laterality: N/A;  instr portal -ml  12/13/22-precall completed-  12/19-AWARE OF CHANGE IN PROCEDURE TIME-RT    ESOPHAGOGASTRODUODENOSCOPY N/A 2/20/2024    Procedure: EGD (ESOPHAGOGASTRODUODENOSCOPY);  Surgeon: Lenard South MD;  Location: [facility] ENDO (4TH FLR);  Service: Endoscopy;  Laterality: N/A;    EXAMINATION UNDER ANESTHESIA Left 4/10/2019    Procedure: Exam under anesthesia botox;  Surgeon: Rhett Aparicio MD;  Location: [facility] OR 2ND FLR;  Service: Colon and Rectal;  Laterality: Left;  S/P- Medi port placed to upper left chest    fistula repairs      KNEE SURGERY  left    SMALL INTESTINE SURGERY      1997    UPPER GASTROINTESTINAL ENDOSCOPY         Social History[3]    OBJECTIVE:     Vital Signs Range (Last 24H):         Significant Labs:  Lab Results   Component Value Date    WBC 7.9 09/05/2024    HGB 10.6 (L) 09/05/2024    HCT 33.9 (L) 09/05/2024     09/05/2024    ALT 28 09/20/2024    AST 23 09/20/2024     09/20/2024    K 4.6 09/20/2024     09/20/2024    CREATININE 1.34 (H) 09/20/2024    BUN 26 09/20/2024    CO2 22 09/20/2024    TSH 3.844 05/03/2021    HGBA1C 6 12/11/2024       Diagnostic Studies: No relevant studies.    EKG:   Results for orders placed or performed during the hospital encounter of 04/09/19   EKG 12-lead    Collection Time: 04/09/19  3:13 PM    Narrative    Test Reason : Z01.818,    Vent. Rate : 086 BPM     Atrial Rate : 086 BPM     P-R Int : 198 ms          QRS Dur : 072 ms      QT Int : 366 ms       P-R-T Axes : 062 080 055 degrees     QTc Int : 437 ms    Normal sinus rhythm  Possible Left atrial enlargement  Borderline Abnormal ECG  No previous ECGs available  Confirmed by MACKENZIE FOSTER MD ([id number]) on 4/9/2019 4:35:41 PM    Referred By: MANI MELISSA           Confirmed By:MACKENZIE FOSTER MD       2D ECHO:  TTE:  No results found for this or any previous visit.    CHANDANA:  No results found for this or any  previous visit.    ASSESSMENT/PLAN:         Pre-op Assessment    I have reviewed the Patient Summary Reports.     I have reviewed the Nursing Notes. I have reviewed the NPO Status.   I have reviewed the Medications.     Review of Systems  Anesthesia Hx:  No problems with previous Anesthesia   History of prior surgery of interest to airway management or planning:  Previous anesthesia: General lap vi 1/24 with general anesthesia.  Procedure performed at an [facility].      Airway issues documented on chart review include GETA, videolaryngoscope used     Denies Family Hx of Anesthesia complications.    Denies Personal Hx of Anesthesia complications.                    Hematology/Oncology:  Hematology Normal   Oncology Normal                                   EENT/Dental:  EENT/Dental Normal           Cardiovascular:  Cardiovascular Normal                                              Pulmonary:  Pulmonary Normal                       Renal/:  Renal/ Normal                 Hepatic/GI:  Hepatic/GI Normal Bowel Prep.      Crohns diseas             Musculoskeletal:  Musculoskeletal Normal                Neurological:  Neurology Normal                                      Endocrine:  Endocrine Normal            Dermatological:  Skin Normal    Psych:  Psychiatric Normal                    Physical Exam  General: Well nourished    Airway:  Mallampati: II   Mouth Opening: Normal  TM Distance: Normal  Tongue: Normal  Neck ROM: Normal ROM    Dental:  Intact        Anesthesia Plan  Type of Anesthesia, risks & benefits discussed:    Anesthesia Type: Gen Natural Airway  Intra-op Monitoring Plan: Standard ASA Monitors  Post Op Pain Control Plan: multimodal analgesia and IV/PO Opioids PRN  Induction:  IV  Informed Consent: Informed consent signed with the Patient and all parties understand the risks and agree with anesthesia plan.  All questions answered. Patient consented to blood products? No  ASA Score: 2  Day of  Surgery Review of History & Physical: H&P Update referred to the surgeon/provider.    Ready For Surgery From Anesthesia Perspective.     .           [1]   Patient Active Problem List  Diagnosis    Breast cancer    Crohn's disease S/P multiple surgery (h/o perianal and buttock fistulas with abscess with severe anal stricture requiring frequent dilation, h/o ileocolonic resections with SB/anastomotic stricture)    Vitamin D deficiency    Anal stricture with constipation    Esophageal stricture    Vitamin B12 deficiency   [2]   Current Facility-Administered Medications on File Prior to Encounter   Medication Dose Route Frequency Provider Last Rate Last Admin    lidocaine (PF) 10 mg/ml (1%) injection 10 mg  1 mL Intradermal Once Marcia Noel NP        mupirocin 2 % ointment   Nasal On Call Procedure Marcia Noel NP   Given at 04/10/19 0655     Current Outpatient Medications on File Prior to Encounter   Medication Sig Dispense Refill    cholecalciferol, vitamin D3, 125 mcg (5,000 unit) capsule Take 5,000 Units by mouth once daily.      cyanocobalamin 1,000 mcg/mL injection 1,000 mcg every 30 days.   1    EPOETIN BRIAN-EPBX INJ Inject as directed as needed (anemia).      fluticasone propionate (FLONASE) 50 mcg/actuation nasal spray 1 spray by Each Nostril route once daily.      inFLIXimab-dyyb (INFLECTRA) 100 mg injection Infuse 10 mg/kg into the vein every 4 weeks 7 each 3    losartan (COZAAR) 100 MG tablet Take 100 mg by mouth once daily.      triamcinolone acetonide 0.1% (KENALOG) 0.1 % ointment Apply 1 application topically 2 (two) times daily.     [3]   Social History  Socioeconomic History    Marital status:    Tobacco Use    Smoking status: Never    Smokeless tobacco: Never   Substance and Sexual Activity    Alcohol use: No    Drug use: No    Sexual activity: Never   Social History Narrative    Insurance      Social Drivers of Health     Financial Resource Strain: Low Risk  (1/9/2025)     Received from [employer]    Overall Financial Resource Strain (CARDIA)     Difficulty of Paying Living Expenses: Not hard at all   Food Insecurity: No Food Insecurity (1/9/2025)    Received from [employer]    Hunger Vital Sign     Worried About Running Out of Food in the Last Year: Never true     Ran Out of Food in the Last Year: Never true   Transportation Needs: No Transportation Needs (1/9/2025)    Received from [employer]    PRAPARE - Transportation     Lack of Transportation (Medical): No     Lack of Transportation (Non-Medical): No   Physical Activity: Insufficiently Active (1/9/2025)    Received from [employer]    Exercise Vital Sign     Days of Exercise per Week: 1 day     Minutes of Exercise per Session: 30 min   Stress: No Stress Concern Present (1/9/2025)    Received from [facility]    [employer] [employer] - Occupational Stress Questionnaire     Feeling of Stress : Not at all   Housing Stability: Unknown (1/9/2025)    Received from [facility]    Housing Stability Vital Sign     Unable to Pay for Housing in the Last Year: No     Number of Times Moved in the Last Year: 1

## 2025-02-24 NOTE — ANESTHESIA POSTPROCEDURE EVALUATION
Anesthesia Post Evaluation    Patient: Aline Fuller    Procedure(s) Performed: Procedure(s) (LRB):  EGD (ESOPHAGOGASTRODUODENOSCOPY) (N/A)  COLONOSCOPY (N/A)    Final Anesthesia Type: general      Patient location during evaluation: GI PACU  Patient participation: Yes- Able to Participate  Level of consciousness: awake and alert  Post-procedure vital signs: reviewed and stable  Pain management: adequate  Airway patency: patent  VALENTIN mitigation strategies: Multimodal analgesia, Preoperative use of mandibular advancement devices or oral appliances and Intraoperative administration of CPAP, nasopharyngeal airway, or oral appliance during sedation  PONV status at discharge: No PONV  Anesthetic complications: no      Cardiovascular status: blood pressure returned to baseline, hemodynamically stable and stable  Respiratory status: unassisted and spontaneous ventilation  Hydration status: euvolemic  Follow-up not needed.              Vitals Value Taken Time   /74 02/24/25 14:35   Temp 36.4 °C (97.6 °F) 02/24/25 14:19   Pulse 96 02/24/25 14:35   Resp 16 02/24/25 14:35   SpO2 100 % 02/24/25 14:35         No case tracking events are documented in the log.      Pain/Jil Score: Jil Score: 10 (2/24/2025  2:36 PM)

## 2025-02-24 NOTE — H&P
Short Stay Endoscopy History and Physical    PCP - Radha Engle MD    Procedure - EGD and colonoscopy  ASA - 2  Mallampati - per anesthesia  History of Anesthesia problems - no  Family history Anesthesia problems -  no     HPI:  This is a 64 y.o. female here for evaluation of :     EGD  Reflux - no  Dysphagia - yes  Abdominal pain - no  Diarrhea - no  Anemia - no  GI bleeding - no  Other - no    Colonoscopy  Screening - no  History of polyps - no  Diarrhea - no  Anemia - no  Blood in stools - no  Abdominal pain - no  Other - Crohn's disease    ROS:  CONSTITUTIONAL: Denies weight change,  fatigue, fevers, chills, night sweats.  CARDIOVASCULAR: Denies chest pain, shortness of breath, orthopnea and edema.  RESPIRATORY: Denies cough, hemoptysis, dyspnea, and wheezing.  GI: See HPI.    Medical History:   Past Medical History:   Diagnosis Date    Anemia     Crohn's disease S/P multiple surgery (ileum, colonh/o perianal and buttock fistulas with abscess with severe anal stricture requiring frequent dilation, h/o ileocolonic resections) 01/31/2017    1973 large bowel resection 1979 further colonic resection 12/17/2010: EUA, dilation of rectal stricture and placement of setons Xs 4    Esophageal stricture     History of Breast cancer dxed 4/2014 4/2014 diagnosed, Lumpectomy 5/2014, S/P Chemo/XRT completed Nov/Dec 2014, on tamoxifen    Immunosuppressed status     Vitamin B12 deficiency     Vitamin D deficiency 01/31/2017       Surgical History:   Past Surgical History:   Procedure Laterality Date    APPENDECTOMY      BIOPSY OF ANUS  4/10/2019    Procedure: BIOPSY, ANUS;  Surgeon: Rhett Aparicio MD;  Location: [facility] OR 2ND FLR;  Service: Colon and Rectal;;    BREAST LUMPECTOMY Right     CHOLECYSTECTOMY      colon resections      COLON SURGERY  1972 or 1973, 1979    COLONOSCOPY      COLONOSCOPY N/A 10/24/2016    Procedure: COLONOSCOPY;  Surgeon: Jamie Polo MD;  Location: [facility] (4TH FLR);  Service:  Endoscopy;  Laterality: N/A;    COLONOSCOPY N/A 8/29/2017    Procedure: COLONOSCOPY;  Surgeon: Diamond Barbour MD;  Location: [facility] ENDO (4TH FLR);  Service: Endoscopy;  Laterality: N/A;  schedule as 45 minute case    COLONOSCOPY N/A 10/10/2018    Procedure: COLONOSCOPY;  Surgeon: Diamond Barbour MD;  Location: [facility] ENDO (4TH FLR);  Service: Endoscopy;  Laterality: N/A;    COLONOSCOPY N/A 12/19/2019    Procedure: COLONOSCOPY;  Surgeon: Diamond Barbour MD;  Location: [facility] ENDO (4TH FLR);  Service: Endoscopy;  Laterality: N/A;    COLONOSCOPY N/A 12/14/2020    Procedure: COLONOSCOPY;  Surgeon: Jamie Polo MD;  Location: [facility] ENDO ([facility] FLR);  Service: Endoscopy;  Laterality: N/A;    COLONOSCOPY N/A 3/17/2022    Procedure: Colonoscopy;  Surgeon: Jamie Polo MD;  [facility]  Service: Endoscopy;  Laterality: N/A;  Pt. is Fully Vaccinated. 3/16 spoke with pt informed of new arrival time of 12pm-pt verbalized undersanding-tb    COLONOSCOPY N/A 2/20/2024    Procedure: COLONOSCOPY;  Surgeon: Lenard South MD;  Location: [facility] (4TH FLR);  Service: Endoscopy;  Laterality: N/A;  Referred by Dr. SAMMIE Barbour, [place] [address], No scheduling concerns,  low residue diet, take dulcolax 5 days before and again with bowel prep- golytely, portal -ml  2/9-lvm for precall-MS  2/14/24- precall complete - ERW    ESOPHAGOGASTRODUODENOSCOPY      ESOPHAGOGASTRODUODENOSCOPY N/A 10/10/2018    Procedure: EGD (ESOPHAGOGASTRODUODENOSCOPY);  Surgeon: Diamond Barbour MD;  [facility]  Service: Endoscopy;  Laterality: N/A;    ESOPHAGOGASTRODUODENOSCOPY N/A 12/19/2019    Procedure: EGD (ESOPHAGOGASTRODUODENOSCOPY);  Surgeon: Diamond Barbour MD;  [facility]  Service: Endoscopy;  Laterality: N/A;    ESOPHAGOGASTRODUODENOSCOPY N/A 1/29/2020    Procedure: ESOPHAGOGASTRODUODENOSCOPY (EGD);  Surgeon: Jamie Polo MD;  Location: [address]);  Service: Endoscopy;  Laterality:  N/A;  with Dr. Polo last week of January or early February per Dr. Trevino-BB    ESOPHAGOGASTRODUODENOSCOPY N/A 12/14/2020    Procedure: EGD (ESOPHAGOGASTRODUODENOSCOPY);  Surgeon: Jamie Polo MD;  Location: [facility] (4TH FLR);  Service: Endoscopy;  Laterality: N/A;  rapid-3 hours from any testing site-pt requested this time-tb    ESOPHAGOGASTRODUODENOSCOPY N/A 3/17/2022    Procedure: ESOPHAGOGASTRODUODENOSCOPY (EGD);  Surgeon: Jamie Polo MD;  Location: [facility] (4TH FLR);  Service: Endoscopy;  Laterality: N/A;  schedule for 60 minute case  Pt requesting Dr. Barbour or Dr. Polo only    ESOPHAGOGASTRODUODENOSCOPY N/A 12/20/2022    Procedure: EGD (ESOPHAGOGASTRODUODENOSCOPY);  Surgeon: Jamie Polo MD;  [facility]  Service: Endoscopy;  Laterality: N/A;  instr portal -ml  12/13/22-precall completed-  12/19-AWARE OF CHANGE IN PROCEDURE TIME-RT    ESOPHAGOGASTRODUODENOSCOPY N/A 2/20/2024    Procedure: EGD (ESOPHAGOGASTRODUODENOSCOPY);  Surgeon: Lenard South MD;  [facility]  Service: Endoscopy;  Laterality: N/A;    EXAMINATION UNDER ANESTHESIA Left 4/10/2019    Procedure: Exam under anesthesia botox;  Surgeon: Rhett Aparicio MD;  [facility];  Service: Colon and Rectal;  Laterality: Left;  S/P- Medi port placed to upper left chest    fistula repairs      KNEE SURGERY  left    SMALL INTESTINE SURGERY      1997    UPPER GASTROINTESTINAL ENDOSCOPY         Family History:   Family History   Problem Relation Name Age of Onset    Cancer Mother  60        colon    Colon cancer Mother  60    Heart attack Father      Breast cancer Sister Jane     Anesthesia problems Sister Jane     Heart disease Brother      Ovarian cancer Maternal Aunt      Stomach cancer Maternal Uncle  68    Heart disease Paternal Uncle      Pancreatic cancer Maternal Grandmother      Celiac disease Neg Hx      Cirrhosis Neg Hx      Colon polyps Neg Hx      Crohn's  disease Neg Hx      Cystic fibrosis Neg Hx      Esophageal cancer Neg Hx      Hemochromatosis Neg Hx      Inflammatory bowel disease Neg Hx      Liver cancer Neg Hx      Liver disease Neg Hx      Rectal cancer Neg Hx      Ulcerative colitis Neg Hx      Gadiel's disease Neg Hx         Social History:   Social History[1]    Allergies: Reviewed    Medications:   Medications Ordered Prior to Encounter[2]    Physical Exam:  Vital Signs:   Vitals:    02/24/25 1249   BP:    Pulse: 108   Resp:    Temp:      General Appearance: Well appearing in no acute distress  ENT: OP clear  Chest: CTA B  CV: RRR, no m/r/g  Abd: s/nt/nd/nabs  Ext: no edema    Labs:Reviewed    Plan:   I have explained the risks and benefits of upper endoscopy and colonoscopy to the patient including but not limited to bleeding, perforation, infection, and death. The patient wishes to proceed.         [1]   Social History  Tobacco Use    Smoking status: Never    Smokeless tobacco: Never   Substance Use Topics    Alcohol use: No    Drug use: No   [2]   Current Facility-Administered Medications on File Prior to Encounter   Medication Dose Route Frequency Provider Last Rate Last Admin    lidocaine (PF) 10 mg/ml (1%) injection 10 mg  1 mL Intradermal Once Marcia Noel NP        mupirocin 2 % ointment   Nasal On Call Procedure Marcia Noel NP   Given at 04/10/19 0655     Current Outpatient Medications on File Prior to Encounter   Medication Sig Dispense Refill    cholecalciferol, vitamin D3, 125 mcg (5,000 unit) capsule Take 5,000 Units by mouth once daily.      cyanocobalamin 1,000 mcg/mL injection 1,000 mcg every 30 days.   1    EPOETIN BRIAN-EPBX INJ Inject as directed as needed (anemia).      fluticasone propionate (FLONASE) 50 mcg/actuation nasal spray 1 spray by Each Nostril route once daily.      inFLIXimab-dyyb (INFLECTRA) 100 mg injection Infuse 10 mg/kg into the vein every 4 weeks 7 each 3    losartan (COZAAR) 100 MG tablet Take 100 mg  by mouth once daily.      triamcinolone acetonide 0.1% (KENALOG) 0.1 % ointment Apply 1 application topically 2 (two) times daily.

## 2025-02-24 NOTE — PROVATION PATIENT INSTRUCTIONS
Discharge Summary/Instructions after an Endoscopic Procedure  Patient Name: Aline Fuller  Patient MRN: [id number]  Patient YOB: 1960 Monday, February 24, 2025  Lenard South MD  Dear patient,  As a result of recent federal legislation (The Federal Cures Act), you may   receive lab or pathology results from your procedure in your [facility]   account before your physician is able to contact you. Your physician or   their representative will relay the results to you with their   recommendations at their soonest availability.  Thank you,  RESTRICTIONS:  During your procedure today, you received medications for sedation.  These   medications may affect your judgment, balance and coordination.  Therefore,   for 24 hours, you have the following restrictions:   - DO NOT drive a car, operate machinery, make legal/financial decisions,   sign important papers or drink alcohol.    ACTIVITY:  Today: no heavy lifting, straining or running due to procedural   sedation/anesthesia.  The following day: return to full activity including work.  DIET:  Eat and drink normally unless instructed otherwise.     TREATMENT FOR COMMON SIDE EFFECTS:  - Mild abdominal pain, nausea, belching, bloating or excessive gas:  rest,   eat lightly and use a heating pad.  - Sore Throat: treat with throat lozenges and/or gargle with warm salt   water.  - Because air was used during the procedure, expelling large amounts of air   from your rectum or belching is normal.  - If a bowel prep was taken, you may not have a bowel movement for 1-3 days.    This is normal.  SYMPTOMS TO WATCH FOR AND REPORT TO YOUR PHYSICIAN:  1. Abdominal pain or bloating, other than gas cramps.  2. Chest pain.  3. Back pain.  4. Signs of infection such as: chills or fever occurring within 24 hours   after the procedure.  5. Rectal bleeding, which would show as bright red, maroon, or black stools.   (A tablespoon of blood from the rectum is not serious, especially  if   hemorrhoids are present.)  6. Vomiting.  7. Weakness or dizziness.  GO DIRECTLY TO THE NEAREST EMERGENCY ROOM IF YOU HAVE ANY OF THE FOLLOWING:      Difficulty breathing              Chills and/or fever over 101 F   Persistent vomiting and/or vomiting blood   Severe abdominal pain   Severe chest pain   Black, tarry stools   Bleeding- more than one tablespoon   Any other symptom or condition that you feel may need urgent attention  Your doctor recommends these additional instructions:  If any biopsies were taken, your doctors clinic will contact you in 1 to 2   weeks with any results.  - Discharge patient to home.   - Resume previous diet today.   - Continue present medications.   - Await pathology results.   - Return to referring physician as previously scheduled.   - Recommend colorectal surgery evaluation for management of progressively   worsening anal stenosis.  - Patient has a contact number available for emergencies.  The signs and   symptoms of potential delayed complications were discussed with the   patient.  Return to normal activities tomorrow.  Written discharge   instructions were provided to the patient.   - Repeat colonoscopy (date not yet determined) for surveillance.  For questions, problems or results please call your physician - Lenard South MD at Work:  [phone].  [facility], EMERGENCY ROOM PHONE NUMBER: [phone]  IF A COMPLICATION OR EMERGENCY SITUATION ARISES AND YOU ARE UNABLE TO REACH   YOUR PHYSICIAN - GO DIRECTLY TO THE EMERGENCY ROOM.  Lenard South MD  2/24/2025 2:23:17 PM  This report has been verified and signed electronically.  Dear patient,  As a result of recent federal legislation (The Federal Cures Act), you may   receive lab or pathology results from your procedure in your [facility]   account before your physician is able to contact you. Your physician or   their representative will relay the results to you with their   recommendations at  their soonest availability.  Thank you,  PROVATION

## 2025-02-24 NOTE — PLAN OF CARE
"  Patient states "my bottom hurts and I don't remenber anything Dr South said."  Told patient I'd be happy to have Dr south come back and  ask him or anesthesia to prescribe something for pain. She declined both and stated she's ready for discharge.  "

## 2025-02-24 NOTE — PROVATION PATIENT INSTRUCTIONS
Discharge Summary/Instructions after an Endoscopic Procedure  Patient Name: Aline Fuller  Patient MRN: [id number]  Patient YOB: 1960 Monday, February 24, 2025  Lenard South MD  Dear patient,  As a result of recent federal legislation (The Federal Cures Act), you may   receive lab or pathology results from your procedure in your [facility]   account before your physician is able to contact you. Your physician or   their representative will relay the results to you with their   recommendations at their soonest availability.  Thank you,  RESTRICTIONS:  During your procedure today, you received medications for sedation.  These   medications may affect your judgment, balance and coordination.  Therefore,   for 24 hours, you have the following restrictions:   - DO NOT drive a car, operate machinery, make legal/financial decisions,   sign important papers or drink alcohol.    ACTIVITY:  Today: no heavy lifting, straining or running due to procedural   sedation/anesthesia.  The following day: return to full activity including work.  DIET:  Eat and drink normally unless instructed otherwise.     TREATMENT FOR COMMON SIDE EFFECTS:  - Mild abdominal pain, nausea, belching, bloating or excessive gas:  rest,   eat lightly and use a heating pad.  - Sore Throat: treat with throat lozenges and/or gargle with warm salt   water.  - Because air was used during the procedure, expelling large amounts of air   from your rectum or belching is normal.  - If a bowel prep was taken, you may not have a bowel movement for 1-3 days.    This is normal.  SYMPTOMS TO WATCH FOR AND REPORT TO YOUR PHYSICIAN:  1. Abdominal pain or bloating, other than gas cramps.  2. Chest pain.  3. Back pain.  4. Signs of infection such as: chills or fever occurring within 24 hours   after the procedure.  5. Rectal bleeding, which would show as bright red, maroon, or black stools.   (A tablespoon of blood from the rectum is not serious, especially  if   hemorrhoids are present.)  6. Vomiting.  7. Weakness or dizziness.  GO DIRECTLY TO THE NEAREST EMERGENCY ROOM IF YOU HAVE ANY OF THE FOLLOWING:      Difficulty breathing              Chills and/or fever over 101 F   Persistent vomiting and/or vomiting blood   Severe abdominal pain   Severe chest pain   Black, tarry stools   Bleeding- more than one tablespoon   Any other symptom or condition that you feel may need urgent attention  Your doctor recommends these additional instructions:  If any biopsies were taken, your doctors clinic will contact you in 1 to 2   weeks with any results.  - Discharge patient to home.   - Resume previous diet today.   - Continue present medications.   - Return to referring physician.   - Repeat upper endoscopy PRN for retreatment.  For questions, problems or results please call your physician - Lenard South MD at Work:  [phone].  [facility], EMERGENCY ROOM PHONE NUMBER: [phone]  IF A COMPLICATION OR EMERGENCY SITUATION ARISES AND YOU ARE UNABLE TO REACH   YOUR PHYSICIAN - GO DIRECTLY TO THE EMERGENCY ROOM.  Lenard South MD  2/24/2025 1:29:19 PM  This report has been verified and signed electronically.  Dear patient,  As a result of recent federal legislation (The Federal Cures Act), you may   receive lab or pathology results from your procedure in your [facility]   account before your physician is able to contact you. Your physician or   their representative will relay the results to you with their   recommendations at their soonest availability.  Thank you,  PROVATION

## 2025-02-25 ENCOUNTER — RESULTS FOLLOW-UP (OUTPATIENT)
Dept: GASTROENTEROLOGY | Facility: CLINIC | Age: 65
End: 2025-02-25

## 2025-02-25 NOTE — TELEPHONE ENCOUNTER
Spoke with the patient. Discussed  - Reviewed labs - Vit D normal, Hep B negative, worsening kidney function (possibly from bowel prep), B12 1052 (H).  - Pt currently taking 1000mcg B12 every month; advised pt Dr. Barbour may recommend reducing the dose - advised her PCP prescribes this and she will discuss with her PCP.  - She has an appointment in April with her kidney specialist.  - Hydrate well         Diamond Barbour MD to [employer] Staff   2/25/25  2:37 PM  Result Note  Please review labs with patient and if see how much vit B12 she is taking- may need to reduce dose  Kidney function a little worse than baseline and she should f/u with PCP or kidney specialist. May be from bowel prep she took. Advise her to hydrate well and continued f/u  Vitamin D normal- continue current dose of vit D  Hep B negative

## 2025-02-26 LAB
FINAL PATHOLOGIC DIAGNOSIS: NORMAL
GROSS: NORMAL
Lab: NORMAL

## 2025-02-27 ENCOUNTER — RESULTS FOLLOW-UP (OUTPATIENT)
Dept: GASTROENTEROLOGY | Facility: HOSPITAL | Age: 65
End: 2025-02-27
Payer: COMMERCIAL

## 2025-02-28 ENCOUNTER — TELEPHONE (OUTPATIENT)
Dept: GASTROENTEROLOGY | Facility: CLINIC | Age: 65
End: 2025-02-28
Payer: COMMERCIAL

## 2025-02-28 NOTE — TELEPHONE ENCOUNTER
----- Message from Diamond Barbour MD sent at 2/27/2025  4:48 PM CST -----  Hi team. Please let patient know that the anal stricture was significant and Dr. South was not able to fully dilate this. Due to the complexity of this I would like her to see Dr. Aparicio for follow up to get this evaluated by EUA. Benjamin

## 2025-02-28 NOTE — TELEPHONE ENCOUNTER
Called & spoke to pt  - Reviewed plan for appt/ EUA w/ Dr. Aparicio for further eval of anal stricture which was unablke to be dilated  - All questions answered  - Will have Dr. Aparicio's team reach out to pt for appt coordination  - Pt expressed understanding

## 2025-02-28 NOTE — TELEPHONE ENCOUNTER
----- Message from Yuki sent at 2/28/2025  1:20 PM CST -----  Regarding: Return call  Contact: [phone]  Type:  Patient Returning CallWho Called:The pt Who Left Message for Patient:Kacey Scott, RNDoes the patient know what this is regarding?:Pt unsure Would the patient rather a call back or a response via [facility]? Call back Best Call Back Number: [phone] Information: Thank you.

## 2025-03-10 ENCOUNTER — OFFICE VISIT (OUTPATIENT)
Dept: GASTROENTEROLOGY | Facility: CLINIC | Age: 65
End: 2025-03-10
Payer: COMMERCIAL

## 2025-03-10 ENCOUNTER — OFFICE VISIT (OUTPATIENT)
Dept: SURGERY | Facility: CLINIC | Age: 65
End: 2025-03-10
Payer: COMMERCIAL

## 2025-03-10 VITALS
OXYGEN SATURATION: 99 % | HEIGHT: 66 IN | WEIGHT: 164.69 LBS | BODY MASS INDEX: 26.47 KG/M2 | DIASTOLIC BLOOD PRESSURE: 82 MMHG | SYSTOLIC BLOOD PRESSURE: 158 MMHG | TEMPERATURE: 99 F | HEART RATE: 92 BPM

## 2025-03-10 VITALS
DIASTOLIC BLOOD PRESSURE: 82 MMHG | SYSTOLIC BLOOD PRESSURE: 158 MMHG | WEIGHT: 164.25 LBS | BODY MASS INDEX: 26.4 KG/M2 | HEIGHT: 66 IN | HEART RATE: 92 BPM

## 2025-03-10 DIAGNOSIS — K50.818 CROHN'S DISEASE OF BOTH SMALL AND LARGE INTESTINE WITH OTHER COMPLICATION: Primary | ICD-10-CM

## 2025-03-10 DIAGNOSIS — Z79.899 IMMUNODEFICIENCY DUE TO LONG TERM IMMUNOSUPPRESSIVE DRUG THERAPY: ICD-10-CM

## 2025-03-10 DIAGNOSIS — K62.4 ANAL STRICTURE: ICD-10-CM

## 2025-03-10 DIAGNOSIS — K83.9 BILE DUCT ABNORMALITY: ICD-10-CM

## 2025-03-10 DIAGNOSIS — K62.4 ANAL STENOSIS: Primary | ICD-10-CM

## 2025-03-10 DIAGNOSIS — K50.118 CROHN'S DISEASE OF LARGE INTESTINE WITH OTHER COMPLICATION: ICD-10-CM

## 2025-03-10 DIAGNOSIS — E53.8 VITAMIN B12 DEFICIENCY: ICD-10-CM

## 2025-03-10 DIAGNOSIS — K22.2 ESOPHAGEAL STRICTURE: ICD-10-CM

## 2025-03-10 DIAGNOSIS — R79.89 LFT ELEVATION: ICD-10-CM

## 2025-03-10 DIAGNOSIS — D84.821 IMMUNODEFICIENCY DUE TO LONG TERM IMMUNOSUPPRESSIVE DRUG THERAPY: ICD-10-CM

## 2025-03-10 DIAGNOSIS — T45.1X5A IMMUNODEFICIENCY DUE TO LONG TERM IMMUNOSUPPRESSIVE DRUG THERAPY: ICD-10-CM

## 2025-03-10 PROCEDURE — 3008F BODY MASS INDEX DOCD: CPT | Mod: CPTII,S$GLB,, | Performed by: COLON & RECTAL SURGERY

## 2025-03-10 PROCEDURE — 1126F AMNT PAIN NOTED NONE PRSNT: CPT | Mod: CPTII,S$GLB,, | Performed by: COLON & RECTAL SURGERY

## 2025-03-10 PROCEDURE — 1160F RVW MEDS BY RX/DR IN RCRD: CPT | Mod: CPTII,S$GLB,, | Performed by: COLON & RECTAL SURGERY

## 2025-03-10 PROCEDURE — 1126F AMNT PAIN NOTED NONE PRSNT: CPT | Mod: CPTII,S$GLB,, | Performed by: INTERNAL MEDICINE

## 2025-03-10 PROCEDURE — 3288F FALL RISK ASSESSMENT DOCD: CPT | Mod: CPTII,S$GLB,, | Performed by: INTERNAL MEDICINE

## 2025-03-10 PROCEDURE — 3008F BODY MASS INDEX DOCD: CPT | Mod: CPTII,S$GLB,, | Performed by: INTERNAL MEDICINE

## 2025-03-10 PROCEDURE — 1101F PT FALLS ASSESS-DOCD LE1/YR: CPT | Mod: CPTII,S$GLB,, | Performed by: COLON & RECTAL SURGERY

## 2025-03-10 PROCEDURE — G2211 COMPLEX E/M VISIT ADD ON: HCPCS | Mod: S$GLB,,, | Performed by: INTERNAL MEDICINE

## 2025-03-10 PROCEDURE — 3077F SYST BP >= 140 MM HG: CPT | Mod: CPTII,S$GLB,, | Performed by: COLON & RECTAL SURGERY

## 2025-03-10 PROCEDURE — 4010F ACE/ARB THERAPY RXD/TAKEN: CPT | Mod: CPTII,S$GLB,, | Performed by: COLON & RECTAL SURGERY

## 2025-03-10 PROCEDURE — 1101F PT FALLS ASSESS-DOCD LE1/YR: CPT | Mod: CPTII,S$GLB,, | Performed by: INTERNAL MEDICINE

## 2025-03-10 PROCEDURE — 99214 OFFICE O/P EST MOD 30 MIN: CPT | Mod: S$GLB,,, | Performed by: INTERNAL MEDICINE

## 2025-03-10 PROCEDURE — 99203 OFFICE O/P NEW LOW 30 MIN: CPT | Mod: S$GLB,,, | Performed by: COLON & RECTAL SURGERY

## 2025-03-10 PROCEDURE — 4010F ACE/ARB THERAPY RXD/TAKEN: CPT | Mod: CPTII,S$GLB,, | Performed by: INTERNAL MEDICINE

## 2025-03-10 PROCEDURE — 3079F DIAST BP 80-89 MM HG: CPT | Mod: CPTII,S$GLB,, | Performed by: COLON & RECTAL SURGERY

## 2025-03-10 PROCEDURE — 3077F SYST BP >= 140 MM HG: CPT | Mod: CPTII,S$GLB,, | Performed by: INTERNAL MEDICINE

## 2025-03-10 PROCEDURE — 3288F FALL RISK ASSESSMENT DOCD: CPT | Mod: CPTII,S$GLB,, | Performed by: COLON & RECTAL SURGERY

## 2025-03-10 PROCEDURE — 3044F HG A1C LEVEL LT 7.0%: CPT | Mod: CPTII,S$GLB,, | Performed by: COLON & RECTAL SURGERY

## 2025-03-10 PROCEDURE — 3079F DIAST BP 80-89 MM HG: CPT | Mod: CPTII,S$GLB,, | Performed by: INTERNAL MEDICINE

## 2025-03-10 PROCEDURE — 1159F MED LIST DOCD IN RCRD: CPT | Mod: CPTII,S$GLB,, | Performed by: COLON & RECTAL SURGERY

## 2025-03-10 PROCEDURE — 99999 PR PBB SHADOW E&M-EST. PATIENT-LVL V: CPT | Mod: PBBFAC,,, | Performed by: COLON & RECTAL SURGERY

## 2025-03-10 RX ORDER — AZELASTINE 1 MG/ML
1 SPRAY, METERED NASAL
COMMUNITY
Start: 2023-12-06 | End: 2026-01-09

## 2025-03-10 RX ORDER — CARVEDILOL 6.25 MG/1
6.25 TABLET ORAL 2 TIMES DAILY
COMMUNITY

## 2025-03-10 RX ORDER — ACETAMINOPHEN AND CODEINE PHOSPHATE 300; 30 MG/1; MG/1
TABLET ORAL
COMMUNITY
Start: 2025-03-04

## 2025-03-10 RX ORDER — PENICILLIN V POTASSIUM 500 MG/1
500 TABLET, FILM COATED ORAL 4 TIMES DAILY
COMMUNITY
Start: 2025-03-04

## 2025-03-10 RX ORDER — CHLORPHENIRAMINE MALEATE 4 MG/1
TABLET ORAL
COMMUNITY
Start: 2025-01-09

## 2025-03-10 NOTE — Clinical Note
Dr Barbour- note is done.  I messaged staff to call [facility] regarding 10/14/25 IFX level result.  They will update you if that result is available.  I wanted to double check if next colonoscopy 2/2026 sounded okay for the note.  She needs scope q1-2 years.  Since the recent scope was not able to advance to the transverse colon, I thought you may prefer 1 year herb.  Thanks.

## 2025-03-10 NOTE — Clinical Note
Patient reports she went to [facility] in [place], [place] for IFX level/abs prior to 10/14/24 infusion but no record of the result available in [facility].  Please call [facility] to see if that was actually drawn. Thank you.

## 2025-03-10 NOTE — PROGRESS NOTES
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 in 2/2025    - continue pantoprazole 40 mg daily  - instructed to let us know if any recurrent symptoms     # Elevated Serum Creatinine  - 2/24/25 labs showed SCr 1.6, eGFR 35.3 (elevated and increased compared to baseline)  - patient confirmed she will closely follow up with PCP for further monitoring  - encouraged continued adequate hydration    # Immunodeficiency due to long term immunosuppressive drug therapy and  IBD specific health maintenance:  CRC risk- sx 1972, >1/3 colon, surveillance colonoscopy q 1-2 years  Skin exam yearly- normal 11/2023, reminded patient to schedule - outside dermatologist- Dr. Shapley  Osteopenia- postmenopausal, had DEXA 3/2024, weight bearing exercise, can't swallow calcium  Pap smear yearly- normal 3/2024- reminded patient to schedule  Vitamin D- continue vit D 3 5000 IU/d, normal (58) 2/2025   Vaccines: no live vaccines, RSV, Tdap, and Heplisav-B recommended- patient prefers to get locally    I personally examined the patient and discussed above plan in collaboration with Nidia Jose, PharmD.      Visit today is associated with current or anticipated ongoing medical care related to  this patient's single serious condition/complex condition Crohn's disease.     Diamond Barbour MD  Department of Gastroenterology  Medical Director, Inflammatory Bowel Disease

## 2025-03-10 NOTE — PATIENT INSTRUCTIONS
- continue pantoprazole and inflectra infusions  - MRI of small intestine as soon as you can- [facility]  - [facility] [place] or Dr. Ketty Jiang ([facility] in [place])  - Establish care with liver specialist.  Please let us know who this is so we can coordinate care.  - Labs 8/2025- CBC CMP- [facility]  - Infliximab drug level 3/31/25- [facility].  We will call [facility] to get the infliximab drug level lab result from 10/14/24 and let you know if drug level on 3/31 is not needed.  - Please let us know if there are any concerns for dental infection or procedure not healing well when you see the dentist on 3/24/25  - Recommend Tdap, RSV vaccine, and HepB (Heplisav) vaccine locally.  Heplisav is 2 doses spaced out by 1 month apart.  - Follow up with PCP next month- Review high B12 level, elevated creatinine   - Get total body skin exam and pap smear

## 2025-03-12 ENCOUNTER — PATIENT MESSAGE (OUTPATIENT)
Dept: GASTROENTEROLOGY | Facility: CLINIC | Age: 65
End: 2025-03-12
Payer: COMMERCIAL

## 2025-03-12 ENCOUNTER — TELEPHONE (OUTPATIENT)
Dept: GASTROENTEROLOGY | Facility: CLINIC | Age: 65
End: 2025-03-12
Payer: COMMERCIAL

## 2025-03-12 NOTE — TELEPHONE ENCOUNTER
[facility]  Collected: 1/3/2022  Ordering provider: Dr. Barbour    Infliximab drug level: 15 ug/mL  Anti-infliximab antibody: <22ng/mL

## 2025-03-12 NOTE — TELEPHONE ENCOUNTER
Facility Name:[facility]  Collected: 10/14/2024  Ordering provider: Dr. Barbour    Infliximab drug level: 18 ug/mL  Anti-infliximab antibody: <22ng/mL    Routed to Dr. Barbour for review

## 2025-04-07 ENCOUNTER — TELEPHONE (OUTPATIENT)
Dept: GASTROENTEROLOGY | Facility: CLINIC | Age: 65
End: 2025-04-07
Payer: COMMERCIAL

## 2025-04-07 ENCOUNTER — RESULTS FOLLOW-UP (OUTPATIENT)
Dept: GASTROENTEROLOGY | Facility: CLINIC | Age: 65
End: 2025-04-07

## 2025-04-14 DIAGNOSIS — Z01.818 PRE-OP TESTING: Primary | ICD-10-CM

## 2025-04-14 NOTE — ANESTHESIA PAT ROS NOTE
04/14/2025  Aline Fuller is a 64 y.o., female.      Pre-op Assessment          Review of Systems  Anesthesia Hx:  No problems with previous Anesthesia   History of prior surgery of interest to airway management or planning:  Previous anesthesia: General EGD 2/24/25 with general anesthesia.  Procedure performed at an [facility].      Airway issues documented on chart review include mask, easy, easy direct laryngoscopy , view on direct laryngoscopy Grade III    Denies Family Hx of Anesthesia complications.    Denies Personal Hx of Anesthesia complications.                    Social:  Non-Smoker, No Alcohol Use       Hematology/Oncology:       -- Anemia:                    --  Cancer in past history:       Breast              EENT/Dental:  EENT/Dental Normal           Cardiovascular:     Hypertension       Denies Angina.            Functional Capacity good / => 4 METS                         Pulmonary:       Denies Shortness of breath.  Denies Recent URI.                 Renal/:  Renal/ Normal                 Hepatic/GI:     GERD   ESOPHAGEAL STRICTURE. ANAL STENOSIS. CROHN'S DISEASE.              Musculoskeletal:       Bone Disorders:    Osteopenia        Neurological:  Neurology Normal                                      Endocrine:     Parathyroid Disease, Hyperparathyroidism            Psych:  Psychiatric Normal                         Anesthesia Assessment: Preoperative EQUATION    Planned Procedure: Procedure(s) (LRB):  EXAM UNDER ANESTHESIA ANAL DILATION (N/A)  Requested Anesthesia Type:Choice  Surgeon: Rhett Aparicio MD  Service: Colon and Rectal  Known or anticipated Date of Surgery:4/23/2025    Surgeon notes: reviewed    Electronic QUestionnaire Assessment completed via nurse interview with patient.        Triage considerations:     The patient has no apparent active cardiac condition (No  unstable coronary Syndrome such as severe unstable angina or recent [<1 month] myocardial infarction, decompensated CHF, severe valvular   disease or significant arrhythmia)    Previous anesthesia records:GETA and No problems    Airway:  Mallampati: II   Mouth Opening: Normal  TM Distance: Normal  Tongue: Normal  Neck ROM: Normal ROM    Airway Present Prior to Hospital Arrival?: No Placement Date: 04/10/19 Placement Time: 0810 Method of Intubation: Direct laryngoscopy Inserted by: CRNA Airway Device: Endotracheal Tube Mask Ventilation: Easy - oral Intubated: Postinduction Airway Device Size: 7.0 Style: Cuffed Cuff Inflation: Minimal occlusive pressure Inflation Amount (mL): 6 Placement Verified By: Auscultation;Capnometry;ETT Condensation Grade: Grade III Complicating Factors: Anterior larynx Findings Post-Intubation: Positive EtCO2;Bilateral breath sounds;Atraumatic/Condition of teeth unchanged Depth of Insertion (cm): 22 Secured at: Lips Complications: None Breath Sounds: Equal Bilateral Insertion attempts (enter comment if more than 2 attempts): 1       Last PCP note: 3-6 months ago , outside [facility]   Subspecialty notes: Gastroenterology, CRS    Other important co-morbidities: GERD and HTN      Tests already available:  Available tests,  within 3 months , within [facility] .     4/7/25  MRI ENTEROGRAPHY    2/24/25  CMP, CBC            Instructions given. (See in Nurse's note)    Optimization:  Anesthesia Preop Clinic Assessment  NOT Indicated    Medical Opinion NOT Indicated             Plan:    Testing:  EKG        Patient  has previously scheduled Medical Appointment: NOT AT THIS TIME    Navigation: Tests Scheduled.                           Results will be tracked by Preop Clinic.

## 2025-04-22 ENCOUNTER — TELEPHONE (OUTPATIENT)
Dept: SURGERY | Facility: CLINIC | Age: 65
End: 2025-04-22
Payer: COMMERCIAL

## 2025-04-23 ENCOUNTER — HOSPITAL ENCOUNTER (OUTPATIENT)
Facility: HOSPITAL | Age: 65
Discharge: HOME | End: 2025-04-23
Attending: COLON & RECTAL SURGERY | Admitting: COLON & RECTAL SURGERY
Payer: COMMERCIAL

## 2025-04-23 ENCOUNTER — ANESTHESIA EVENT (OUTPATIENT)
Dept: SURGERY | Facility: HOSPITAL | Age: 65
End: 2025-04-23
Payer: COMMERCIAL

## 2025-04-23 ENCOUNTER — ANESTHESIA (OUTPATIENT)
Dept: SURGERY | Facility: HOSPITAL | Age: 65
End: 2025-04-23
Payer: COMMERCIAL

## 2025-04-23 VITALS
BODY MASS INDEX: 26.4 KG/M2 | HEIGHT: 66 IN | OXYGEN SATURATION: 100 % | TEMPERATURE: 98 F | RESPIRATION RATE: 19 BRPM | WEIGHT: 164.25 LBS | HEART RATE: 82 BPM | DIASTOLIC BLOOD PRESSURE: 78 MMHG | SYSTOLIC BLOOD PRESSURE: 155 MMHG

## 2025-04-23 DIAGNOSIS — K50.818 CROHN'S DISEASE OF BOTH SMALL AND LARGE INTESTINE WITH OTHER COMPLICATION: Primary | ICD-10-CM

## 2025-04-23 DIAGNOSIS — K62.4 ANAL STENOSIS: ICD-10-CM

## 2025-04-23 DIAGNOSIS — K50.118 CROHN'S DISEASE OF LARGE INTESTINE WITH OTHER COMPLICATION: ICD-10-CM

## 2025-04-23 DIAGNOSIS — Z01.818 PRE-OP TESTING: ICD-10-CM

## 2025-04-23 LAB
OHS QRS DURATION: 74 MS
OHS QTC CALCULATION: 440 MS

## 2025-04-23 PROCEDURE — 36000707: Performed by: COLON & RECTAL SURGERY

## 2025-04-23 PROCEDURE — 25000003 PHARM REV CODE 250

## 2025-04-23 PROCEDURE — 93010 ELECTROCARDIOGRAM REPORT: CPT | Mod: ,,, | Performed by: INTERNAL MEDICINE

## 2025-04-23 PROCEDURE — 46999 UNLISTED PROCEDURE ANUS: CPT | Mod: ,,, | Performed by: COLON & RECTAL SURGERY

## 2025-04-23 PROCEDURE — 37000009 HC ANESTHESIA EA ADD 15 MINS: Performed by: COLON & RECTAL SURGERY

## 2025-04-23 PROCEDURE — 63600175 PHARM REV CODE 636 W HCPCS: Performed by: ANESTHESIOLOGY

## 2025-04-23 PROCEDURE — 37000008 HC ANESTHESIA 1ST 15 MINUTES: Performed by: COLON & RECTAL SURGERY

## 2025-04-23 PROCEDURE — 25000003 PHARM REV CODE 250: Performed by: NURSE PRACTITIONER

## 2025-04-23 PROCEDURE — 36000706: Performed by: COLON & RECTAL SURGERY

## 2025-04-23 PROCEDURE — 71000015 HC POSTOP RECOV 1ST HR: Performed by: COLON & RECTAL SURGERY

## 2025-04-23 PROCEDURE — 88305 TISSUE EXAM BY PATHOLOGIST: CPT | Mod: TC | Performed by: COLON & RECTAL SURGERY

## 2025-04-23 PROCEDURE — 45905 DILATION OF ANAL SPHINCTER: CPT | Mod: 51,,, | Performed by: COLON & RECTAL SURGERY

## 2025-04-23 PROCEDURE — 63600175 PHARM REV CODE 636 W HCPCS

## 2025-04-23 PROCEDURE — 88305 TISSUE EXAM BY PATHOLOGIST: CPT | Mod: 26,,, | Performed by: PATHOLOGY

## 2025-04-23 PROCEDURE — 71000044 HC DOSC ROUTINE RECOVERY FIRST HOUR: Performed by: COLON & RECTAL SURGERY

## 2025-04-23 PROCEDURE — 63600175 PHARM REV CODE 636 W HCPCS: Performed by: COLON & RECTAL SURGERY

## 2025-04-23 PROCEDURE — 93005 ELECTROCARDIOGRAM TRACING: CPT

## 2025-04-23 RX ORDER — GLUCAGON 1 MG
1 KIT INJECTION
Status: DISCONTINUED | OUTPATIENT
Start: 2025-04-23 | End: 2025-04-23 | Stop reason: HOSPADM

## 2025-04-23 RX ORDER — DEXAMETHASONE SODIUM PHOSPHATE 4 MG/ML
INJECTION, SOLUTION INTRA-ARTICULAR; INTRALESIONAL; INTRAMUSCULAR; INTRAVENOUS; SOFT TISSUE
Status: DISCONTINUED | OUTPATIENT
Start: 2025-04-23 | End: 2025-04-23

## 2025-04-23 RX ORDER — DEXMEDETOMIDINE HYDROCHLORIDE 100 UG/ML
INJECTION, SOLUTION INTRAVENOUS
Status: DISCONTINUED | OUTPATIENT
Start: 2025-04-23 | End: 2025-04-23

## 2025-04-23 RX ORDER — FENTANYL CITRATE 50 UG/ML
25 INJECTION, SOLUTION INTRAMUSCULAR; INTRAVENOUS EVERY 5 MIN PRN
Status: COMPLETED | OUTPATIENT
Start: 2025-04-23 | End: 2025-04-23

## 2025-04-23 RX ORDER — BUPIVACAINE HYDROCHLORIDE 2.5 MG/ML
INJECTION, SOLUTION EPIDURAL; INFILTRATION; INTRACAUDAL; PERINEURAL
Status: DISCONTINUED | OUTPATIENT
Start: 2025-04-23 | End: 2025-04-23 | Stop reason: HOSPADM

## 2025-04-23 RX ORDER — SODIUM CHLORIDE 0.9 % (FLUSH) 0.9 %
3 SYRINGE (ML) INJECTION
Status: DISCONTINUED | OUTPATIENT
Start: 2025-04-23 | End: 2025-04-23 | Stop reason: HOSPADM

## 2025-04-23 RX ORDER — LIDOCAINE HYDROCHLORIDE 20 MG/ML
INJECTION, SOLUTION EPIDURAL; INFILTRATION; INTRACAUDAL; PERINEURAL
Status: DISCONTINUED | OUTPATIENT
Start: 2025-04-23 | End: 2025-04-23

## 2025-04-23 RX ORDER — PROPOFOL 10 MG/ML
VIAL (ML) INTRAVENOUS
Status: DISCONTINUED | OUTPATIENT
Start: 2025-04-23 | End: 2025-04-23

## 2025-04-23 RX ORDER — ROCURONIUM BROMIDE 10 MG/ML
INJECTION, SOLUTION INTRAVENOUS
Status: DISCONTINUED | OUTPATIENT
Start: 2025-04-23 | End: 2025-04-23

## 2025-04-23 RX ORDER — FENTANYL CITRATE 50 UG/ML
INJECTION, SOLUTION INTRAMUSCULAR; INTRAVENOUS
Status: DISCONTINUED | OUTPATIENT
Start: 2025-04-23 | End: 2025-04-23

## 2025-04-23 RX ORDER — SODIUM CHLORIDE 9 MG/ML
INJECTION, SOLUTION INTRAVENOUS CONTINUOUS
Status: DISCONTINUED | OUTPATIENT
Start: 2025-04-23 | End: 2025-04-23 | Stop reason: HOSPADM

## 2025-04-23 RX ORDER — OXYCODONE HYDROCHLORIDE 5 MG/1
5 TABLET ORAL ONCE
Refills: 0 | Status: COMPLETED | OUTPATIENT
Start: 2025-04-23 | End: 2025-04-23

## 2025-04-23 RX ORDER — MUPIROCIN 20 MG/G
OINTMENT TOPICAL
Status: COMPLETED
Start: 2025-04-23 | End: 2025-04-23

## 2025-04-23 RX ORDER — OXYCODONE HYDROCHLORIDE 5 MG/1
5 TABLET ORAL EVERY 4 HOURS PRN
Qty: 20 TABLET | Refills: 0 | Status: SHIPPED | OUTPATIENT
Start: 2025-04-23

## 2025-04-23 RX ORDER — PROCHLORPERAZINE EDISYLATE 5 MG/ML
5 INJECTION INTRAMUSCULAR; INTRAVENOUS EVERY 30 MIN PRN
Status: DISCONTINUED | OUTPATIENT
Start: 2025-04-23 | End: 2025-04-23 | Stop reason: HOSPADM

## 2025-04-23 RX ORDER — DEXTROMETHORPHAN HYDROBROMIDE, GUAIFENESIN 5; 100 MG/5ML; MG/5ML
650 LIQUID ORAL EVERY 8 HOURS
COMMUNITY
Start: 2025-04-23

## 2025-04-23 RX ORDER — MIDAZOLAM HYDROCHLORIDE 1 MG/ML
INJECTION INTRAMUSCULAR; INTRAVENOUS
Status: DISCONTINUED | OUTPATIENT
Start: 2025-04-23 | End: 2025-04-23

## 2025-04-23 RX ORDER — MUPIROCIN 20 MG/G
OINTMENT TOPICAL
Status: DISCONTINUED | OUTPATIENT
Start: 2025-04-23 | End: 2025-04-23 | Stop reason: HOSPADM

## 2025-04-23 RX ORDER — ONDANSETRON HYDROCHLORIDE 2 MG/ML
INJECTION, SOLUTION INTRAVENOUS
Status: DISCONTINUED | OUTPATIENT
Start: 2025-04-23 | End: 2025-04-23

## 2025-04-23 RX ADMIN — FENTANYL CITRATE 100 MCG: 50 INJECTION, SOLUTION INTRAMUSCULAR; INTRAVENOUS at 08:04

## 2025-04-23 RX ADMIN — FENTANYL CITRATE 25 MCG: 50 INJECTION INTRAMUSCULAR; INTRAVENOUS at 09:04

## 2025-04-23 RX ADMIN — OXYCODONE 5 MG: 5 TABLET ORAL at 09:04

## 2025-04-23 RX ADMIN — DEXMEDETOMIDINE 6 MCG: 100 INJECTION, SOLUTION, CONCENTRATE INTRAVENOUS at 08:04

## 2025-04-23 RX ADMIN — SODIUM CHLORIDE: 0.9 INJECTION, SOLUTION INTRAVENOUS at 08:04

## 2025-04-23 RX ADMIN — ROCURONIUM BROMIDE 50 MG: 10 INJECTION, SOLUTION INTRAVENOUS at 08:04

## 2025-04-23 RX ADMIN — MIDAZOLAM HYDROCHLORIDE 2 MG: 2 INJECTION, SOLUTION INTRAMUSCULAR; INTRAVENOUS at 08:04

## 2025-04-23 RX ADMIN — ONDANSETRON 4 MG: 2 INJECTION INTRAMUSCULAR; INTRAVENOUS at 08:04

## 2025-04-23 RX ADMIN — MUPIROCIN: 20 OINTMENT TOPICAL at 06:04

## 2025-04-23 RX ADMIN — PROPOFOL 120 MG: 10 INJECTION, EMULSION INTRAVENOUS at 08:04

## 2025-04-23 RX ADMIN — LIDOCAINE HYDROCHLORIDE 100 MG: 20 INJECTION, SOLUTION EPIDURAL; INFILTRATION; INTRACAUDAL; PERINEURAL at 08:04

## 2025-04-23 RX ADMIN — SUGAMMADEX 200 MG: 100 INJECTION, SOLUTION INTRAVENOUS at 08:04

## 2025-04-23 RX ADMIN — DEXAMETHASONE SODIUM PHOSPHATE 4 MG: 4 INJECTION, SOLUTION INTRAMUSCULAR; INTRAVENOUS at 08:04

## 2025-04-23 NOTE — ANESTHESIA POSTPROCEDURE EVALUATION
Anesthesia Post Evaluation    Patient: Aline Fuller    Procedure(s) Performed: Procedure(s) (LRB):  EXAM UNDER ANESTHESIA ANAL DILATION (N/A)  BIOPSY, ANUS (N/A)    Final Anesthesia Type: general      Patient location during evaluation: PACU  Patient participation: Yes- Able to Participate  Level of consciousness: awake and alert  Post-procedure vital signs: reviewed and stable  Pain management: adequate  Airway patency: patent    PONV status at discharge: No PONV  Anesthetic complications: no      Cardiovascular status: blood pressure returned to baseline  Respiratory status: unassisted  Hydration status: euvolemic  Follow-up not needed.          Vitals Value Taken Time   /78 04/23/25 10:02   Temp 36.7 °C (98.1 °F) 04/23/25 08:59   Pulse 74 04/23/25 10:12   Resp 19 04/23/25 10:00   SpO2 99 % 04/23/25 10:12   Vitals shown include unfiled device data.      No case tracking events are documented in the log.      Pain/Jil Score: Pain Rating Prior to Med Admin: 5 (4/23/2025  9:34 AM)  Jil Score: 9 (4/23/2025  9:30 AM)

## 2025-04-23 NOTE — DISCHARGE INSTRUCTIONS
Anal Surgery Post Op Instructions:    1. Take tylenol for pain.  Take prescription medication for breakthrough pain.  Take sitz baths as needed for comfort.   2. Take capful of miralax daily while taking pain medication.  Increase to once capful twice a day if no bowel movement in 24-48 hours.   3.  Some blood per rectum is expected after this procedure and may last for 1-2 weeks post op.  Call if bleeding is > 1 cup.   4. Call [phone] for worsening pain, inability to urinate or fever > 101.  [facility] after surgery   5. Restart home medications

## 2025-04-23 NOTE — BRIEF OP NOTE
[facility] (Formerly [facility])  Brief Operative Note    Surgery Date: 4/23/2025     Surgeons and Role:     * Rhett Aparicio MD - Primary     * Ronald Arora MD - Resident - Assisting        Pre-op Diagnosis:  Anal stenosis [K62.4]  Crohn's disease of large intestine with other complication [K50.118]    Post-op Diagnosis:  Post-Op Diagnosis Codes:     * Anal stenosis [K62.4]     * Crohn's disease of large intestine with other complication [K50.118]    Procedure(s) (LRB):  EXAM UNDER ANESTHESIA ANAL DILATION (N/A)  BIOPSY, ANUS (N/A)    Anesthesia: Choice    Operative Findings: Condylomas and stricture on exam. Severe anal stenosis dilated with progressively larger catrachito dilators up to 20/ finger breadth. Excisional biopsies in 2 quadrants. Hemostasis achieved.     Estimated Blood Loss -2cc         Specimens:   Specimen (24h ago, onward)       Start     Ordered    04/23/25 0839  Specimen to Pathology Other (Colon and Rectal)  RELEASE UPON ORDERING        References:    Click here for ordering Quick Tip   Question:  Release to patient  Answer:  Immediate    04/23/25 0839                    ID Type Source Tests Collected by Time Destination   1 : Anal canal biopsy Tissue Anus SPECIMEN TO PATHOLOGY Rhett Aparicio MD 4/23/2025 0832            Discharge Note    OUTCOME: Patient tolerated treatment/procedure well without complication and is now ready for discharge.    DISPOSITION: Home or Self Care    FINAL DIAGNOSIS:  Crohns with anal stenosis      FOLLOWUP: In clinic    DISCHARGE INSTRUCTIONS:    Discharge Procedure Orders   Diet Adult Regular     No dressing needed     Notify your health care provider if you experience any of the following:  temperature >100.4     Notify your health care provider if you experience any of the following:  persistent nausea and vomiting or diarrhea     Notify your health care provider if you experience any of the following:  severe uncontrolled pain     Notify your health care provider if you  experience any of the following:  redness, tenderness, or signs of infection (pain, swelling, redness, odor or green/yellow discharge around incision site)     Activity as tolerated        Clinical Reference Documents Added to Patient Instructions         Document    RECTAL EXAM UNDER ANESTHESIA (ENGLISH)

## 2025-04-23 NOTE — ANESTHESIA PREPROCEDURE EVALUATION
04/23/2025  Aline Fuller is a 64 y.o., female.      Pre-op Assessment    I have reviewed the NPO Status.      Review of Systems  Anesthesia Hx:  No problems with previous Anesthesia   History of prior surgery of interest to airway management or planning:  Previous anesthesia: General        Denies Family Hx of Anesthesia complications.    Denies Personal Hx of Anesthesia complications.                    Cardiovascular:  Exercise tolerance: good                                             Hepatic/GI:        Crohn's Disease               Physical Exam  General: Well nourished, Cooperative, Alert and Oriented    Airway:  Mallampati: II   Mouth Opening: Normal  TM Distance: Normal  Tongue: Normal  Neck ROM: Normal ROM    Anesthesia Plan  Type of Anesthesia, risks & benefits discussed:    Anesthesia Type: Gen ETT, Gen Supraglottic Airway, Gen Natural Airway  Intra-op Monitoring Plan: Standard ASA Monitors  Post Op Pain Control Plan: multimodal analgesia  Induction:  IV  Informed Consent: Informed consent signed with the Patient and all parties understand the risks and agree with anesthesia plan.  All questions answered.   ASA Score: 3    Ready For Surgery From Anesthesia Perspective.   .

## 2025-04-23 NOTE — PROGRESS NOTES
Subjective:       Patient ID: Aline Fuller is a 64 y.o. female.    Chief Complaint: Anal Stricture    HPI 65 yo F Crohn's disease with an anal stricture.  I performed an EUA with dilation in April 2019 - biopsies at that time showed no evidence of dysplasia.  I last saw her 8/2020 at which point she was asymptomatic.  She presents today for follow-up after last colonoscopy showed worsening stenosis.  She is having some minor difficulty with her bowel movements - as long as stool is soft, she can move her bowels fairly well, but if her stool gets firm she has difficulty.  Having 1-2 soft BM's/day, no abdominal pain, no straining, no blood in stool. She is on Inflectra 10 mg/kg every 4 weeks.   She has had multiple abdominal surgeries & bowel resections dating back to 1973 - has a known ileocolic anastomotic stricture with proximal SB dilation which has been stable on serial imaging studies.  Also has a hx of prior fistulizing anal disease, currently quiescent. She also was found to have dilated CBD and intra-hepatic ducts on MRCP done 11/2024 b/c of elevated LFT's- referred to hepatology but has not yet seen them.    Last colonoscopy - 2/24/25: (endoscopy report and images personally reviewed)  - The perianal exam was significant for scarring from prior interventions as well as large anal skin tags.   - Anal stricture found on digital rectal exam.   - Attempts were made to dilate the anal stenosis with both the index finger and the fifth digit unsuccessfully. The stenosis is extremely firm and even with gentle steady pressure dilation was not successful.   - Attempted to use the pediatric colonoscope and the slim pediatric colonoscope but neither of these was able to be inserted into the rectum due to the anal stenosis.  - An adult endoscope was able to pass into the rectum with steady pressure. Due to looping this was only able to be advanced to the transverse colon in spite of using abdominal pressure in  multiple areas of the abdomen.   - The distal rectum and anal verge are normal on retroflexion view.   - Normal mucosa in the rectum, in the recto-sigmoid colon, in the sigmoid colon, in the descending colon, at the splenic flexure and in the transverse colon.   Biopsies -  No significant active inflammation, no granulomas, no dysplasia.       MRE 4/7/25:  (Images personally reviewed.)  1. Crohn's disease without evidence for active inflammation.  Overall appearance similar to prior study.  2. Stable appearance of luminal narrowing at the cat terminal ileum without upstream dilatation.    +Family hx of CRC - mother diagnosed at age 60.      Review of patient's allergies indicates:   Allergen Reactions    Amlodipine Rash    Sulfa (sulfonamide antibiotics) Hives     Other reaction(s): Rash       Past Medical History:   Diagnosis Date    Anemia     Crohn's disease S/P multiple surgery (ileum, colonh/o perianal and buttock fistulas with abscess with severe anal stricture requiring frequent dilation, h/o ileocolonic resections) 01/31/2017 1973 large bowel resection 1979 further colonic resection 12/17/2010: EUA, dilation of rectal stricture and placement of setons Xs 4    Esophageal stricture     History of Breast cancer dxed 4/2014 4/2014 diagnosed, Lumpectomy 5/2014, S/P Chemo/XRT completed Nov/Dec 2014, on tamoxifen    Immunosuppressed status     Vitamin B12 deficiency     Vitamin D deficiency 01/31/2017       Past Surgical History:   Procedure Laterality Date    APPENDECTOMY      BIOPSY OF ANUS  4/10/2019    Procedure: BIOPSY, ANUS;  Surgeon: Rhett Aparicio MD;  Location: [facility] OR 2ND FLR;  Service: Colon and Rectal;;    BREAST LUMPECTOMY Right     CHOLECYSTECTOMY      colon resections      COLON SURGERY  1972 or 1973, 1979    COLONOSCOPY      COLONOSCOPY N/A 10/24/2016    Procedure: COLONOSCOPY;  Surgeon: Jamie Polo MD;  Location: [facility] (4TH FLR);  Service: Endoscopy;  Laterality: N/A;    COLONOSCOPY  N/A 8/29/2017    Procedure: COLONOSCOPY;  Surgeon: Diamond Barbour MD;  Location: [facility] ENDO (4TH FLR);  Service: Endoscopy;  Laterality: N/A;  schedule as 45 minute case    COLONOSCOPY N/A 10/10/2018    Procedure: COLONOSCOPY;  Surgeon: Diamond Brabour MD;  Location: [facility] ENDO (4TH FLR);  Service: Endoscopy;  Laterality: N/A;    COLONOSCOPY N/A 12/19/2019    Procedure: COLONOSCOPY;  Surgeon: Diamond Barbour MD;  Location: [facility] ENDO (4TH FLR);  Service: Endoscopy;  Laterality: N/A;    COLONOSCOPY N/A 12/14/2020    Procedure: COLONOSCOPY;  Surgeon: Jamie Polo MD;  Location: [facility] ENDO (4TH FLR);  Service: Endoscopy;  Laterality: N/A;    COLONOSCOPY N/A 3/17/2022    Procedure: Colonoscopy;  Surgeon: Jamie Polo MD;  Location: [facility] ENDO (4TH FLR);  Service: Endoscopy;  Laterality: N/A;  Pt. is Fully Vaccinated. 3/16 spoke with pt informed of new arrival time of 12pm-pt verbalized undersanding-tb    COLONOSCOPY N/A 2/20/2024    Procedure: COLONOSCOPY;  Surgeon: Lenard South MD;  Location: [facility] (4TH FLR);  Service: Endoscopy;  Laterality: N/A;  Referred by Dr. SAMMIE Barbour, [facility], No scheduling concerns,  low residue diet, take dulcolax 5 days before and again with bowel prep- golytely, portal -ml  2/9-lvm for precall-MS  2/14/24- precall complete - ERW    COLONOSCOPY N/A 2/24/2025    Procedure: COLONOSCOPY;  Surgeon: Lenard South MD;  Location: [facility] ENDO (4TH FLR);  Service: Endoscopy;  Laterality: N/A;    ESOPHAGOGASTRODUODENOSCOPY      ESOPHAGOGASTRODUODENOSCOPY N/A 10/10/2018    Procedure: EGD (ESOPHAGOGASTRODUODENOSCOPY);  Surgeon: Diamond Barbour MD;  [facility]  Service: Endoscopy;  Laterality: N/A;    ESOPHAGOGASTRODUODENOSCOPY N/A 12/19/2019    Procedure: EGD (ESOPHAGOGASTRODUODENOSCOPY);  Surgeon: Diamond Barbour MD;  Location: [facility] ([address] [place] [address]);  Service: Endoscopy;  Laterality: N/A;    ESOPHAGOGASTRODUODENOSCOPY N/A 1/29/2020    Procedure:  ESOPHAGOGASTRODUODENOSCOPY (EGD);  Surgeon: Jamie Polo MD;  Location: [facility] (4TH FLR);  Service: Endoscopy;  Laterality: N/A;  with Dr. Polo last week of January or early February per Dr. Trevino-BB    ESOPHAGOGASTRODUODENOSCOPY N/A 12/14/2020    Procedure: EGD (ESOPHAGOGASTRODUODENOSCOPY);  Surgeon: Jamie Polo MD;  Location: [facility] (4TH FLR);  Service: Endoscopy;  Laterality: N/A;  rapid-3 hours from any testing site-pt requested this time-tb    ESOPHAGOGASTRODUODENOSCOPY N/A 3/17/2022    Procedure: ESOPHAGOGASTRODUODENOSCOPY (EGD);  Surgeon: Jamie Polo MD;  Location: [facility] (4TH FLR);  Service: Endoscopy;  Laterality: N/A;  schedule for 60 minute case  Pt requesting Dr. Barbour or Dr. Polo only    ESOPHAGOGASTRODUODENOSCOPY N/A 12/20/2022    Procedure: EGD (ESOPHAGOGASTRODUODENOSCOPY);  Surgeon: Jamie Polo MD;  [facility]  Service: Endoscopy;  Laterality: N/A;  instr portal -ml  12/13/22-precall completed-MH  12/19-AWARE OF CHANGE IN PROCEDURE TIME-RT    ESOPHAGOGASTRODUODENOSCOPY N/A 2/20/2024    Procedure: EGD (ESOPHAGOGASTRODUODENOSCOPY);  Surgeon: Lenard South MD;  [facility]  Service: Endoscopy;  Laterality: N/A;    ESOPHAGOGASTRODUODENOSCOPY N/A 2/24/2025    Procedure: EGD (ESOPHAGOGASTRODUODENOSCOPY);  Surgeon: Lenard South MD;  Location: [facility] (4TH FLR);  Service: Endoscopy;  Laterality: N/A;  Ref By:loli Fields PEG.[facility] upper right chest.AC  1/30 pre call attempted/LVM/mleone  1/30 pt called to reschedule d/t ride/ has PEG/portal SW  2/14/25: attempted precall / LVM / resent instructions - MP  2/17 LVM for precall BP  2/18 pre     EXAMINATION UNDER ANESTHESIA Left 4/10/2019    Procedure: Exam under anesthesia botox;  Surgeon: Rhett Aparicio MD;  Location: [facility] OR [address] [place] [address];  Service: Colon and Rectal;  Laterality: Left;  S/P- Medi port placed to upper left chest    fistula repairs      KNEE SURGERY   left    SMALL INTESTINE SURGERY      1997    UPPER GASTROINTESTINAL ENDOSCOPY         Current Medications[1]    Family History   Problem Relation Name Age of Onset    Cancer Mother  60        colon    Colon cancer Mother  60    Heart attack Father      Breast cancer Sister Jane     Anesthesia problems Sister Jane     Heart disease Brother      Ovarian cancer Maternal Aunt      Stomach cancer Maternal Uncle  68    Heart disease Paternal Uncle      Pancreatic cancer Maternal Grandmother      Celiac disease Neg Hx      Cirrhosis Neg Hx      Colon polyps Neg Hx      Crohn's disease Neg Hx      Cystic fibrosis Neg Hx      Esophageal cancer Neg Hx      Hemochromatosis Neg Hx      Inflammatory bowel disease Neg Hx      Liver cancer Neg Hx      Liver disease Neg Hx      Rectal cancer Neg Hx      Ulcerative colitis Neg Hx      Gadiel's disease Neg Hx         Social History     Socioeconomic History    Marital status:    Tobacco Use    Smoking status: Never    Smokeless tobacco: Never   Substance and Sexual Activity    Alcohol use: No    Drug use: No    Sexual activity: Never   Social History Narrative    Insurance      Social Drivers of Health     Financial Resource Strain: Low Risk  (3/8/2025)    Overall Financial Resource Strain (CARDIA)     Difficulty of Paying Living Expenses: Not hard at all   Food Insecurity: No Food Insecurity (3/8/2025)    Hunger Vital Sign     Worried About Running Out of Food in the Last Year: Never true     Ran Out of Food in the Last Year: Never true   Transportation Needs: No Transportation Needs (3/8/2025)    PRAPARE - Transportation     Lack of Transportation (Medical): No     Lack of Transportation (Non-Medical): No   Physical Activity: Insufficiently Active (3/8/2025)    Exercise Vital Sign     Days of Exercise per Week: 1 day     Minutes of Exercise per Session: 10 min   Stress: No Stress Concern Present (3/8/2025)    [employer] [place] [employer] - Occupational  Stress Questionnaire     Feeling of Stress : Only a little   Housing Stability: Low Risk  (3/8/2025)    Housing Stability Vital Sign     Unable to Pay for Housing in the Last Year: No     Number of Times Moved in the Last Year: 0     Homeless in the Last Year: No       Review of Systems   Constitutional:  Negative for activity change, appetite change, chills, diaphoresis, fatigue, fever and unexpected weight change.   Gastrointestinal:  Positive for constipation. Negative for abdominal distention, abdominal pain, anal bleeding, blood in stool, diarrhea, nausea, rectal pain and vomiting.   All other systems reviewed and are negative.      Objective:      Physical Exam  Vitals reviewed. Exam conducted with a chaperone present.   Constitutional:       Appearance: She is well-developed.   HENT:      Head: Normocephalic.   Pulmonary:      Effort: Pulmonary effort is normal. No respiratory distress.   Abdominal:      General: There is no distension.      Palpations: Abdomen is soft. There is no mass.      Tenderness: There is no abdominal tenderness. There is no guarding or rebound.      Hernia: No hernia is present.      Comments: Multiple scars   Genitourinary:     Comments: Perineum - large skin tags, no obvious fistulae seen, +tight stenosis  Musculoskeletal:         General: Normal range of motion.      Cervical back: Normal range of motion and neck supple.   Skin:     General: Skin is warm and dry.   Neurological:      Mental Status: She is alert and oriented to person, place, and time.           Lab Results   Component Value Date    WBC 9.03 02/24/2025    HGB 10.4 (L) 02/24/2025    HCT 33.2 (L) 02/24/2025    MCV 90 02/24/2025     02/24/2025     BMP  Lab Results   Component Value Date     02/24/2025    K 3.8 02/24/2025     02/24/2025    CO2 24 02/24/2025    BUN 19 02/24/2025    CREATININE 1.6 (H) 02/24/2025    CALCIUM 10.1 02/24/2025    ANIONGAP 11 02/24/2025    ESTGFRAFRICA >60.0 05/02/2022     EGFRNONAA >60.0 05/02/2022     CMP  Sodium   Date Value Ref Range Status   02/24/2025 140 136 - 145 mmol/L Final     Potassium   Date Value Ref Range Status   02/24/2025 3.8 3.5 - 5.1 mmol/L Final     Chloride   Date Value Ref Range Status   02/24/2025 105 95 - 110 mmol/L Final     CO2   Date Value Ref Range Status   02/24/2025 24 23 - 29 mmol/L Final     Glucose   Date Value Ref Range Status   02/24/2025 109 70 - 110 mg/dL Final     BUN   Date Value Ref Range Status   02/24/2025 19 8 - 23 mg/dL Final     Creatinine   Date Value Ref Range Status   02/24/2025 1.6 (H) 0.5 - 1.4 mg/dL Final     Calcium   Date Value Ref Range Status   02/24/2025 10.1 8.7 - 10.5 mg/dL Final     Total Protein   Date Value Ref Range Status   02/24/2025 8.5 (H) 6.0 - 8.4 g/dL Final     Albumin   Date Value Ref Range Status   02/24/2025 3.8 3.5 - 5.2 g/dL Final     Total Bilirubin   Date Value Ref Range Status   02/24/2025 0.6 0.1 - 1.0 mg/dL Final     Comment:     For infants and newborns, interpretation of results should be based  on gestational age, weight and in agreement with clinical  observations.    Premature Infant recommended reference ranges:  Up to 24 hours.............<8.0 mg/dL  Up to 48 hours............<12.0 mg/dL  3-5 days..................<15.0 mg/dL  6-29 days.................<15.0 mg/dL       Alkaline Phosphatase   Date Value Ref Range Status   02/24/2025 119 40 - 150 U/L Final     AST   Date Value Ref Range Status   02/24/2025 28 10 - 40 U/L Final     ALT   Date Value Ref Range Status   02/24/2025 31 10 - 44 U/L Final     Anion Gap   Date Value Ref Range Status   02/24/2025 11 8 - 16 mmol/L Final     eGFR if    Date Value Ref Range Status   05/02/2022 >60.0 >60 mL/min/1.73 m^2 Final     eGFR if non    Date Value Ref Range Status   05/02/2022 >60.0 >60 mL/min/1.73 m^2 Final     Comment:     Calculation used to obtain the estimated glomerular filtration  rate (eGFR) is the CKD-EPI equation.  "       No results found for: "CEA"  Lab Results   Component Value Date    CRP 3.5 05/03/2021           Assessment:       1. Anal stenosis    2. Crohn's disease of large intestine with other complication        Plan:   Scheduled for EUA/dilation in OR 4//23/25.  Will get biopsies as well to exclude dysplasia.    I have discussed the procedure at length with Aline Fuller.  We discussed the rationale, risks, benefits, and alternatives in depth.  We discussed the expected outcomes and potential complications including but not limited to bleeding, infection, recurrence, prolonged pain, need for further procedures, altered continence, post-operative bladder dysfunction, and injury to adjacent organs.  She verbalized her understanding of the procedure and wishes to proceed.  Written consent was obtained.    Specifically discussed risk of altered continence.  Also discussed risk of dysplasia/cancer and very high likelihood of recurrent stricture.  I had previously discussed proctectomy/stoma with her and she was extremely resistant.  I again discussed this with her today and she continues to be extremely resistant to the idea of an ostomy.    Rhett Aparicio MD, FACS, FASCRS  , Department of Colon & Rectal Surgery     This note was created using voice recognition software, and may contain some unrecognized transcriptional errors.          [1]   No current facility-administered medications for this visit.     No current outpatient medications on file.     Facility-Administered Medications Ordered in Other Visits   Medication Dose Route Frequency Provider Last Rate Last Admin    0.9% NaCl infusion   Intravenous Continuous Maribell Mackey NP        lidocaine (PF) 10 mg/ml (1%) injection 10 mg  1 mL Intradermal Once Marcia Noel NP        mupirocin (BACTROBAN) 2 % ointment             mupirocin 2 % ointment   Nasal On Call Procedure Marcia Noel NP   Given at 04/10/19 0655    mupirocin 2 % " ointment   Nasal On Call Procedure Maribell Mackey, NP

## 2025-04-23 NOTE — H&P
Patient ID: Aline Fuller is a 64 y.o. female.     Chief Complaint: Anal Stricture     HPI 63 yo F Crohn's disease with an anal stricture.  I performed an EUA with dilation in April 2019 - biopsies at that time showed no evidence of dysplasia.  I last saw her 8/2020 at which point she was asymptomatic.  She was referred for follow-up after last colonoscopy showed worsening stenosis.  She is having some minor difficulty with her bowel movements - as long as stool is soft, she can move her bowels fairly well, but if her stool gets firm she has difficulty.  Having 1-2 soft BM's/day, no abdominal pain, no straining, no blood in stool. She is on Inflectra 10 mg/kg every 4 weeks.   She has had multiple abdominal surgeries & bowel resections dating back to 1973 - has a known ileocolic anastomotic stricture with proximal SB dilation which has been stable on serial imaging studies.  Also has a hx of prior fistulizing anal disease, currently quiescent. She also was found to have dilated CBD and intra-hepatic ducts on MRCP done 11/2024 b/c of elevated LFT's- referred to hepatology but has not yet seen them.     Last colonoscopy - 2/24/25: (endoscopy report and images personally reviewed)  - The perianal exam was significant for scarring from prior interventions as well as large anal skin tags.   - Anal stricture found on digital rectal exam.   - Attempts were made to dilate the anal stenosis with both the index finger and the fifth digit unsuccessfully. The stenosis is extremely firm and even with gentle steady pressure dilation was not successful.   - Attempted to use the pediatric colonoscope and the slim pediatric colonoscope but neither of these was able to be inserted into the rectum due to the anal stenosis.  - An adult endoscope was able to pass into the rectum with steady pressure. Due to looping this was only able to be advanced to the transverse colon in spite of using abdominal pressure in multiple areas of the  abdomen.   - The distal rectum and anal verge are normal on retroflexion view.   - Normal mucosa in the rectum, in the recto-sigmoid colon, in the sigmoid colon, in the descending colon, at the splenic flexure and in the transverse colon.   Biopsies -  No significant active inflammation, no granulomas, no dysplasia.        MRE 4/7/25:  (Images personally reviewed.)  1. Crohn's disease without evidence for active inflammation.  Overall appearance similar to prior study.  2. Stable appearance of luminal narrowing at the cat terminal ileum without upstream dilatation.     +Family hx of CRC - mother diagnosed at age 60.              Review of patient's allergies indicates:   Allergen Reactions    Amlodipine Rash    Sulfa (sulfonamide antibiotics) Hives       Other reaction(s): Rash              Past Medical History:   Diagnosis Date    Anemia      Crohn's disease S/P multiple surgery (ileum, colonh/o perianal and buttock fistulas with abscess with severe anal stricture requiring frequent dilation, h/o ileocolonic resections) 01/31/2017     1973 large bowel resection 1979 further colonic resection 12/17/2010: EUA, dilation of rectal stricture and placement of setons Xs 4    Esophageal stricture      History of Breast cancer dxed 4/2014 4/2014 diagnosed, Lumpectomy 5/2014, S/P Chemo/XRT completed Nov/Dec 2014, on tamoxifen    Immunosuppressed status      Vitamin B12 deficiency      Vitamin D deficiency 01/31/2017               Past Surgical History:   Procedure Laterality Date    APPENDECTOMY        BIOPSY OF ANUS   4/10/2019     Procedure: BIOPSY, ANUS;  Surgeon: Rhett Aparicio MD;  Location: [facility] OR 2ND FLR;  Service: Colon and Rectal;;    BREAST LUMPECTOMY Right      CHOLECYSTECTOMY        colon resections        COLON SURGERY   1972 or 1973, 1979    COLONOSCOPY        COLONOSCOPY N/A 10/24/2016     Procedure: COLONOSCOPY;  Surgeon: Jamie Polo MD;  Location: [facility] ENDO (4TH FLR);  Service: Endoscopy;   Laterality: N/A;    COLONOSCOPY N/A 8/29/2017     Procedure: COLONOSCOPY;  Surgeon: Diamond Barbour MD;  Location: [facility] ENDO (4TH FLR);  Service: Endoscopy;  Laterality: N/A;  schedule as 45 minute case    COLONOSCOPY N/A 10/10/2018     Procedure: COLONOSCOPY;  Surgeon: Diamond Barbour MD;  Location: [facility] ENDO (4TH FLR);  Service: Endoscopy;  Laterality: N/A;    COLONOSCOPY N/A 12/19/2019     Procedure: COLONOSCOPY;  Surgeon: Diamond Barbour MD;  Location: [facility] ENDO (4TH FLR);  Service: Endoscopy;  Laterality: N/A;    COLONOSCOPY N/A 12/14/2020     Procedure: COLONOSCOPY;  Surgeon: Jamie Polo MD;  Location: [facility] ENDO ([facility] FLR);  Service: Endoscopy;  Laterality: N/A;    COLONOSCOPY N/A 3/17/2022     Procedure: Colonoscopy;  Surgeon: Jamie Polo MD;  [facility]  Service: Endoscopy;  Laterality: N/A;  Pt. is Fully Vaccinated. 3/16 spoke with pt informed of new arrival time of 12pm-pt verbalized undersanding-tb    COLONOSCOPY N/A 2/20/2024     Procedure: COLONOSCOPY;  Surgeon: Lenard South MD;  [facility]  Service: Endoscopy;  Laterality: N/A;  [place]. SAMMIE Barbour[place] [facility] [place] concerns,  low residue diet, take dulcolax 5 days before and again with bowel prep- golytely, portal -ml  2/9-lvm for precall-MS  2/14/24- precall complete - ERW    COLONOSCOPY N/A 2/24/2025     Procedure: COLONOSCOPY;  Surgeon: Lenard South MD;  Location: [facility] ENDO (4TH FLR);  Service: Endoscopy;  Laterality: N/A;    ESOPHAGOGASTRODUODENOSCOPY        ESOPHAGOGASTRODUODENOSCOPY N/A 10/10/2018     Procedure: EGD (ESOPHAGOGASTRODUODENOSCOPY);  Surgeon: Diamond Barbour MD;  [facility]  Service: Endoscopy;  Laterality: N/A;    ESOPHAGOGASTRODUODENOSCOPY N/A 12/19/2019     Procedure: EGD (ESOPHAGOGASTRODUODENOSCOPY);  Surgeon: Diamond Barbour MD;  Location: [facility] ([address] [place] [address]);  Service: Endoscopy;  Laterality: N/A;    ESOPHAGOGASTRODUODENOSCOPY  N/A 1/29/2020     Procedure: ESOPHAGOGASTRODUODENOSCOPY (EGD);  Surgeon: Jamie Polo MD;  Location: [facility] (4TH FLR);  Service: Endoscopy;  Laterality: N/A;  with Dr. Polo last week of January or early February per Dr. Trevino-BB    ESOPHAGOGASTRODUODENOSCOPY N/A 12/14/2020     Procedure: EGD (ESOPHAGOGASTRODUODENOSCOPY);  Surgeon: Jamie Polo MD;  Location: [facility] (4TH FLR);  Service: Endoscopy;  Laterality: N/A;  rapid-3 hours from any testing site-pt requested this time-tb    ESOPHAGOGASTRODUODENOSCOPY N/A 3/17/2022     Procedure: ESOPHAGOGASTRODUODENOSCOPY (EGD);  Surgeon: Jamie Polo MD;  Location: [facility] (4TH FLR);  Service: Endoscopy;  Laterality: N/A;  schedule for 60 minute case  Pt requesting Dr. Barbour or Dr. Polo only    ESOPHAGOGASTRODUODENOSCOPY N/A 12/20/2022     Procedure: EGD (ESOPHAGOGASTRODUODENOSCOPY);  Surgeon: Jamie Polo MD;  Location: [facility] (4TH FLR);  Service: Endoscopy;  Laterality: N/A;  instr portal -ml  12/13/22-precall completed-  12/19-AWARE OF CHANGE IN PROCEDURE TIME-RT    ESOPHAGOGASTRODUODENOSCOPY N/A 2/20/2024     Procedure: EGD (ESOPHAGOGASTRODUODENOSCOPY);  Surgeon: Lenard South MD;  Location: [facility] ENDO (4TH FLR);  Service: Endoscopy;  Laterality: N/A;    ESOPHAGOGASTRODUODENOSCOPY N/A 2/24/2025     Procedure: EGD (ESOPHAGOGASTRODUODENOSCOPY);  Surgeon: Lenard South MD;  Location: [facility] ENDO (4TH FLR);  Service: Endoscopy;  Laterality: N/A;  Ref By:loli [email or web address].[facility] upper right chest.AC  1/30 pre call attempted/LVM/mleone  1/30 pt called to reschedule d/t ride/ has PEG/portal SW  2/14/25: attempted precall / LVM / resent instructions - MP  2/17 LVM for precall BP  2/18 pre     EXAMINATION UNDER ANESTHESIA Left 4/10/2019     Procedure: Exam under anesthesia botox;  Surgeon: Rhett Aparicio MD;  Location: [facility] OR 2ND [facility];  Service: Colon and Rectal;  Laterality: Left;  S/P- Medi port placed to upper left chest     fistula repairs        KNEE SURGERY   left    SMALL INTESTINE SURGERY         1997    UPPER GASTROINTESTINAL ENDOSCOPY                    Family History   Problem Relation Name Age of Onset    Cancer Mother   60         colon    Colon cancer Mother   60    Heart attack Father        Breast cancer Sister Jane      Anesthesia problems Sister Jane      Heart disease Brother        Ovarian cancer Maternal Aunt        Stomach cancer Maternal Uncle   68    Heart disease Paternal Uncle        Pancreatic cancer Maternal Grandmother        Celiac disease Neg Hx        Cirrhosis Neg Hx        Colon polyps Neg Hx        Crohn's disease Neg Hx        Cystic fibrosis Neg Hx        Esophageal cancer Neg Hx        Hemochromatosis Neg Hx        Inflammatory bowel disease Neg Hx        Liver cancer Neg Hx        Liver disease Neg Hx        Rectal cancer Neg Hx        Ulcerative colitis Neg Hx        Gadiel's disease Neg Hx             Social History           Socioeconomic History    Marital status:    Tobacco Use    Smoking status: Never    Smokeless tobacco: Never   Substance and Sexual Activity    Alcohol use: No    Drug use: No    Sexual activity: Never   Social History Narrative     Insurance       Social Drivers of Health           Financial Resource Strain: Low Risk  (3/8/2025)     Overall Financial Resource Strain (CARDIA)      Difficulty of Paying Living Expenses: Not hard at all   Food Insecurity: No Food Insecurity (3/8/2025)     Hunger Vital Sign      Worried About Running Out of Food in the Last Year: Never true      Ran Out of Food in the Last Year: Never true   Transportation Needs: No Transportation Needs (3/8/2025)     PRAPARE - Transportation      Lack of Transportation (Medical): No      Lack of Transportation (Non-Medical): No   Physical Activity: Insufficiently Active (3/8/2025)     Exercise Vital Sign      Days of Exercise per Week: 1 day      Minutes of Exercise per Session: 10 min   Stress: No  Stress Concern Present (3/8/2025)     [employer] - Occupational Stress Questionnaire      Feeling of Stress : Only a little   Housing Stability: Low Risk  (3/8/2025)     Housing Stability Vital Sign      Unable to Pay for Housing in the Last Year: No      Number of Times Moved in the Last Year: 0      Homeless in the Last Year: No         Review of Systems   Constitutional:  Negative for activity change, appetite change, chills, diaphoresis, fatigue, fever and unexpected weight change.   Gastrointestinal:  Positive for constipation. Negative for abdominal distention, abdominal pain, anal bleeding, blood in stool, diarrhea, nausea, rectal pain and vomiting.   All other systems reviewed and are negative.        Objective:      Objective  Physical Exam  Vitals reviewed. Exam conducted with a chaperone present.   Constitutional:       Appearance: She is well-developed.   HENT:      Head: Normocephalic.   Pulmonary:      Effort: Pulmonary effort is normal. No respiratory distress.   Abdominal:      General: There is no distension.      Palpations: Abdomen is soft. There is no mass.      Tenderness: There is no abdominal tenderness. There is no guarding or rebound.      Hernia: No hernia is present.      Comments: Multiple scars   Genitourinary:     Comments: Perineum - large skin tags, no obvious fistulae seen, +tight stenosis  Musculoskeletal:         General: Normal range of motion.      Cervical back: Normal range of motion and neck supple.   Skin:     General: Skin is warm and dry.   Neurological:      Mental Status: She is alert and oriented to person, place, and time.                     Lab Results   Component Value Date     WBC 9.03 02/24/2025     HGB 10.4 (L) 02/24/2025     HCT 33.2 (L) 02/24/2025     MCV 90 02/24/2025      02/24/2025      BMP        Lab Results   Component Value Date      02/24/2025     K 3.8 02/24/2025      02/24/2025     CO2 24 02/24/2025      BUN 19 02/24/2025     CREATININE 1.6 (H) 02/24/2025     CALCIUM 10.1 02/24/2025     ANIONGAP 11 02/24/2025     ESTGFRAFRICA >60.0 05/02/2022     EGFRNONAA >60.0 05/02/2022      CMP        Sodium   Date Value Ref Range Status   02/24/2025 140 136 - 145 mmol/L Final            Potassium   Date Value Ref Range Status   02/24/2025 3.8 3.5 - 5.1 mmol/L Final            Chloride   Date Value Ref Range Status   02/24/2025 105 95 - 110 mmol/L Final            CO2   Date Value Ref Range Status   02/24/2025 24 23 - 29 mmol/L Final            Glucose   Date Value Ref Range Status   02/24/2025 109 70 - 110 mg/dL Final            BUN   Date Value Ref Range Status   02/24/2025 19 8 - 23 mg/dL Final            Creatinine   Date Value Ref Range Status   02/24/2025 1.6 (H) 0.5 - 1.4 mg/dL Final            Calcium   Date Value Ref Range Status   02/24/2025 10.1 8.7 - 10.5 mg/dL Final            Total Protein   Date Value Ref Range Status   02/24/2025 8.5 (H) 6.0 - 8.4 g/dL Final            Albumin   Date Value Ref Range Status   02/24/2025 3.8 3.5 - 5.2 g/dL Final              Total Bilirubin   Date Value Ref Range Status   02/24/2025 0.6 0.1 - 1.0 mg/dL Final       Comment:       For infants and newborns, interpretation of results should be based  on gestational age, weight and in agreement with clinical  observations.     Premature Infant recommended reference ranges:  Up to 24 hours.............<8.0 mg/dL  Up to 48 hours............<12.0 mg/dL  3-5 days..................<15.0 mg/dL  6-29 days.................<15.0 mg/dL               Alkaline Phosphatase   Date Value Ref Range Status   02/24/2025 119 40 - 150 U/L Final            AST   Date Value Ref Range Status   02/24/2025 28 10 - 40 U/L Final            ALT   Date Value Ref Range Status   02/24/2025 31 10 - 44 U/L Final            Anion Gap   Date Value Ref Range Status   02/24/2025 11 8 - 16 mmol/L Final            eGFR if    Date Value Ref Range Status  "  05/02/2022 >60.0 >60 mL/min/1.73 m^2 Final              eGFR if non    Date Value Ref Range Status   05/02/2022 >60.0 >60 mL/min/1.73 m^2 Final       Comment:       Calculation used to obtain the estimated glomerular filtration  rate (eGFR) is the CKD-EPI equation.          No results found for: "CEA"        Lab Results   Component Value Date     CRP 3.5 05/03/2021               Assessment:      Assessment  1. Anal stenosis    2. Crohn's disease of large intestine with other complication          Plan:   EUA/dilation, biopsies to exclude dysplasia.     I have discussed the procedure at length with Aline Fuller.  We discussed the rationale, risks, benefits, and alternatives in depth.  We discussed the expected outcomes and potential complications including but not limited to bleeding, infection, recurrence, prolonged pain, need for further procedures, altered continence, post-operative bladder dysfunction, and injury to adjacent organs.  She verbalized her understanding of the procedure and wishes to proceed.  Written consent was obtained.     Rhett Aparicio MD, FACS, FASCRS  , Department of Colon & Rectal Surgery      This note was created using voice recognition software, and may contain some unrecognized transcriptional errors.      "

## 2025-04-23 NOTE — TRANSFER OF CARE
"Anesthesia Transfer of Care Note    Patient: Aline Fuller    Procedure(s) Performed: Procedure(s) (LRB):  EXAM UNDER ANESTHESIA ANAL DILATION (N/A)  BIOPSY, ANUS (N/A)    Patient location: [facility]    Anesthesia Type: general    Transport from OR: Transported from OR on 6-10 L/min O2 by face mask with adequate spontaneous ventilation    Post pain: adequate analgesia    Post assessment: no apparent anesthetic complications and tolerated procedure well    Post vital signs: stable    Level of consciousness: awake    Nausea/Vomiting: no nausea/vomiting    Complications: none    Transfer of care protocol was followed      Last vitals: Visit Vitals  BP (!) 164/82 (BP Location: Right arm, Patient Position: Lying)   Pulse 87   Temp 36.7 °C (98.1 °F) (Temporal)   Resp 20   Ht 5' 6" (1.676 m)   Wt 74.5 kg (164 lb 3.9 oz)   SpO2 100%   Breastfeeding No   BMI 26.51 kg/m²     "

## 2025-04-23 NOTE — H&P
Surgery H&P    Chief Complaint: Anal Stricture here for EUA/dilation with biopsies    HPI:    HPI 65 yo F Crohn's disease with an anal stricture.  S/p EUA with dilation in April 2019 - biopsies at that time showed no evidence of dysplasia. Symptomatic when constipated but ok when having soft stools. She has had multiple abdominal surgeries & bowel resections dating back to 1973 - has a known ileocolic anastomotic stricture with proximal SB dilation which has been stable on serial imaging studies.  Also has a hx of prior fistulizing anal disease, currently quiescent. She also was found to have dilated CBD and intra-hepatic ducts on MRCP done 11/2024 b/c of elevated LFT's- referred to hepatology but has not yet seen them.     Last colonoscopy - 2/24/25: (endoscopy report and images personally reviewed)  - The perianal exam was significant for scarring from prior interventions as well as large anal skin tags.   - Anal stricture found on digital rectal exam.   - Attempts were made to dilate the anal stenosis with both the index finger and the fifth digit unsuccessfully. The stenosis is extremely firm and even with gentle steady pressure dilation was not successful.   - Attempted to use the pediatric colonoscope and the slim pediatric colonoscope but neither of these was able to be inserted into the rectum due to the anal stenosis.  - An adult endoscope was able to pass into the rectum with steady pressure. Due to looping this was only able to be advanced to the transverse colon in spite of using abdominal pressure in multiple areas of the abdomen.   - The distal rectum and anal verge are normal on retroflexion view.   - Normal mucosa in the rectum, in the recto-sigmoid colon, in the sigmoid colon, in the descending colon, at the splenic flexure and in the transverse colon.   Biopsies -  No significant active inflammation, no granulomas, no dysplasia.        MRE 4/7/25:  (Images personally reviewed.)  1. Crohn's disease      without evidence for active inflammation.  Overall appearance similar to prior study.  2. Stable appearance of luminal narrowing at the cat terminal ileum without upstream dilatation.     +Family hx of CRC - mother diagnosed at age 60.    Interval history:  Pt denies any changes since clinic. Presents for EUA with dilation and biopsies. RBA discussed this morning.     PMH:      Past Medical History:   Diagnosis Date    Anemia     Crohn's disease S/P multiple surgery (ileum, colonh/o perianal and buttock fistulas with abscess with severe anal stricture requiring frequent dilation, h/o ileocolonic resections) 01/31/2017    1973 large bowel resection 1979 further colonic resection 12/17/2010: EUA, dilation of rectal stricture and placement of setons Xs 4    Esophageal stricture     History of Breast cancer dxed 4/2014 4/2014 diagnosed, Lumpectomy 5/2014, S/P Chemo/XRT completed Nov/Dec 2014, on tamoxifen    Immunosuppressed status     Vitamin B12 deficiency     Vitamin D deficiency 01/31/2017         PSH:      Past Surgical History:   Procedure Laterality Date    APPENDECTOMY      BIOPSY OF ANUS  4/10/2019    Procedure: BIOPSY, ANUS;  Surgeon: Rhett Aparicio MD;  Location: [facility] OR [address] [place] [address];  Service: Colon and Rectal;;    BREAST LUMPECTOMY Right     CHOLECYSTECTOMY      colon resections      COLON SURGERY  1972 or 1973, 1979    COLONOSCOPY      COLONOSCOPY N/A 10/24/2016    Procedure: COLONOSCOPY;  Surgeon: Jamie Polo MD;  Location: [address]);  Service: Endoscopy;  Laterality: N/A;    COLONOSCOPY N/A 8/29/2017    Procedure: COLONOSCOPY;  Surgeon: Diamond Barbour MD;  Location: [address]);  Service: Endoscopy;  Laterality: N/A;  schedule as 45 minute case    COLONOSCOPY N/A 10/10/2018    Procedure: COLONOSCOPY;  Surgeon: Diamond Barbour MD;  Location: [address]);  Service: Endoscopy;  Laterality: N/A;    COLONOSCOPY N/A 12/19/2019    Procedure: COLONOSCOPY;  Surgeon: Diamond LOPEZ  MD Km;  Location: [facility] ONI (4TH FLR);  Service: Endoscopy;  Laterality: N/A;    COLONOSCOPY N/A 12/14/2020    Procedure: COLONOSCOPY;  Surgeon: Jamie Polo MD;  Location: [facility] ENDO (4TH FLR);  Service: Endoscopy;  Laterality: N/A;    COLONOSCOPY N/A 3/17/2022    Procedure: Colonoscopy;  Surgeon: Jamie Polo MD;  Location: [facility] ENDO (4TH FLR);  Service: Endoscopy;  Laterality: N/A;  Pt. is Fully Vaccinated. 3/16 spoke with pt informed of new arrival time of 12pm-pt verbalized undersanding-tb    COLONOSCOPY N/A 2/20/2024    Procedure: COLONOSCOPY;  Surgeon: Lenard South MD;  Location: [facility] (4TH FLR);  Service: Endoscopy;  Laterality: N/A;  Referred by Dr. SAMMIE Barbour, [facility] 4, No scheduling concerns,  low residue diet, take dulcolax 5 days before and again with bowel prep- golytely, portal -ml  2/9-lvm for precall-MS  2/14/24- precall complete - ERW    COLONOSCOPY N/A 2/24/2025    Procedure: COLONOSCOPY;  Surgeon: Lenard South MD;  [facility]  Service: Endoscopy;  Laterality: N/A;    ESOPHAGOGASTRODUODENOSCOPY      ESOPHAGOGASTRODUODENOSCOPY N/A 10/10/2018    Procedure: EGD (ESOPHAGOGASTRODUODENOSCOPY);  Surgeon: Diamond Barbour MD;  [facility]  Service: Endoscopy;  Laterality: N/A;    ESOPHAGOGASTRODUODENOSCOPY N/A 12/19/2019    Procedure: EGD (ESOPHAGOGASTRODUODENOSCOPY);  Surgeon: Diamond Barbour MD;  Location: [facility] ENDO ([facility] FLR);  Service: Endoscopy;  Laterality: N/A;    ESOPHAGOGASTRODUODENOSCOPY N/A 1/29/2020    Procedure: ESOPHAGOGASTRODUODENOSCOPY (EGD);  Surgeon: Jamie Polo MD;  Location: [facility] ENDO ([facility] FLR);  Service: Endoscopy;  Laterality: N/A;  with Dr. Polo last week of January or early February per Dr. Trevino-BB    ESOPHAGOGASTRODUODENOSCOPY N/A 12/14/2020    Procedure: EGD (ESOPHAGOGASTRODUODENOSCOPY);  Surgeon: Jamie Polo MD;  Location: [address]);  Service: Endoscopy;  Laterality: N/A;  rapid-3 hours  from any testing site-pt requested this time-tb    ESOPHAGOGASTRODUODENOSCOPY N/A 3/17/2022    Procedure: ESOPHAGOGASTRODUODENOSCOPY (EGD);  Surgeon: Jamie Polo MD;  Location: [facility] ENDO (4TH FLR);  Service: Endoscopy;  Laterality: N/A;  schedule for 60 minute case  Pt requesting Dr. Barbour or Dr. Polo only    ESOPHAGOGASTRODUODENOSCOPY N/A 12/20/2022    Procedure: EGD (ESOPHAGOGASTRODUODENOSCOPY);  Surgeon: Jamie Polo MD;  Location: [facility] ENDO (4TH FLR);  Service: Endoscopy;  Laterality: N/A;  instr portal -ml  12/13/22-precall completed-MH  12/19-AWARE OF CHANGE IN PROCEDURE TIME-RT    ESOPHAGOGASTRODUODENOSCOPY N/A 2/20/2024    Procedure: EGD (ESOPHAGOGASTRODUODENOSCOPY);  Surgeon: Lenard South MD;  Location: [facility] (4TH FLR);  Service: Endoscopy;  Laterality: N/A;    ESOPHAGOGASTRODUODENOSCOPY N/A 2/24/2025    Procedure: EGD (ESOPHAGOGASTRODUODENOSCOPY);  Surgeon: Lenard South MD;  Location: [facility] (4TH FLR);  Service: Endoscopy;  Laterality: N/A;  Ref By:,loli,PEG.Mediport upper right chest.AC  1/30 pre call attempted/LVM/mleone  1/30 pt called to reschedule d/t ride/ has PEG/portal   2/14/25: attempted precall / LVM / resent instructions - MP  2/17 LVM for precall BP  2/18 pre     EXAMINATION UNDER ANESTHESIA Left 4/10/2019    Procedure: Exam under anesthesia botox;  Surgeon: Rhett Aparicio MD;  Location: [facility] OR 2ND FLR;  Service: Colon and Rectal;  Laterality: Left;  S/P- Medi port placed to upper left chest    fistula repairs      KNEE SURGERY  left    SMALL INTESTINE SURGERY      1997    UPPER GASTROINTESTINAL ENDOSCOPY           FamHx:       Family History   Problem Relation Name Age of Onset    Cancer Mother  60        colon    Colon cancer Mother  60    Heart attack Father      Breast cancer Sister Jane     Anesthesia problems Sister Jane     Heart disease Brother      Ovarian cancer Maternal Aunt      Stomach cancer Maternal Uncle  68    Heart  disease Paternal Uncle      Pancreatic cancer Maternal Grandmother      Celiac disease Neg Hx      Cirrhosis Neg Hx      Colon polyps Neg Hx      Crohn's disease Neg Hx      Cystic fibrosis Neg Hx      Esophageal cancer Neg Hx      Hemochromatosis Neg Hx      Inflammatory bowel disease Neg Hx      Liver cancer Neg Hx      Liver disease Neg Hx      Rectal cancer Neg Hx      Ulcerative colitis Neg Hx      Gadiel's disease Neg Hx           SocHx:     Social Drivers of Health     Tobacco Use: Low Risk  (4/23/2025)    Patient History     Smoking Tobacco Use: Never     Smokeless Tobacco Use: Never     Passive Exposure: Not on file   Alcohol Use: Not At Risk (3/8/2025)    AUDIT-C     Frequency of Alcohol Consumption: Never     Average Number of Drinks: Patient does not drink     Frequency of Binge Drinking: Never   Financial Resource Strain: Low Risk  (3/8/2025)    Overall Financial Resource Strain (CARDIA)     Difficulty of Paying Living Expenses: Not hard at all   Food Insecurity: No Food Insecurity (3/8/2025)    Hunger Vital Sign     Worried About Running Out of Food in the Last Year: Never true     Ran Out of Food in the Last Year: Never true   Transportation Needs: No Transportation Needs (3/8/2025)    PRAPARE - Transportation     Lack of Transportation (Medical): No     Lack of Transportation (Non-Medical): No   Physical Activity: Insufficiently Active (3/8/2025)    Exercise Vital Sign     Days of Exercise per Week: 1 day     Minutes of Exercise per Session: 10 min   Stress: No Stress Concern Present (3/8/2025)    [employer] [place] [employer] - Occupational Stress Questionnaire     Feeling of Stress : Only a little   Housing Stability: Low Risk  (3/8/2025)    Housing Stability Vital Sign     Unable to Pay for Housing in the Last Year: No     Number of Times Moved in the Last Year: 0     Homeless in the Last Year: No   Depression: Not at risk (1/9/2025)    [facility]  [facility]    PHQ-2     PHQ-2 Score: 0   Utilities: Not At Risk (3/8/2025)    [facility] Utilities     Threatened with loss of utilities: No   Health Literacy: Adequate Health Literacy (3/8/2025)     Health Literacy     Frequency of need for help with medical instructions: Never   Social Isolation: Not on file         ROS:  Per HPI; ten point review of systems otherwise negative.    Current Meds:  Medications Ordered Prior to Encounter[1]    Allergies:    Review of patient's allergies indicates:   Allergen Reactions    Amlodipine Rash    Sulfa (sulfonamide antibiotics) Hives     Other reaction(s): Rash       Physical Exam:    Vitals:    04/23/25 0647   BP:    Pulse: 94   Resp:    Temp:        Physical Exam  Vitals reviewed.   Constitutional:       Appearance: Normal appearance.   HENT:      Head: Normocephalic.   Eyes:      Extraocular Movements: Extraocular movements intact.   Cardiovascular:      Rate and Rhythm: Normal rate.   Pulmonary:      Effort: Pulmonary effort is normal.   Abdominal:      General: Abdomen is flat.      Palpations: Abdomen is soft.   Genitourinary:     Comments: deferred  Musculoskeletal:      Cervical back: Normal range of motion.   Skin:     General: Skin is warm.   Neurological:      Mental Status: She is alert.           Labs: reviewed    Imaging: reviewed    Assessment/Plan:   64F with hx of crohns and complex surgical history, now with Anal Stricture     To OR for EUA/dilation with biopsies. Consent obtained in clinic. Rba discussed this morning and patient wished to proceed.          [1]   Current Facility-Administered Medications on File Prior to Encounter   Medication Dose Route Frequency Provider Last Rate Last Admin    lidocaine (PF) 10 mg/ml (1%) injection 10 mg  1 mL Intradermal Once Marcia Noel NP        mupirocin 2 % ointment   Nasal On Call Procedure Marcia Noel NP   Given at 04/10/19 0655     Current Outpatient Medications on File Prior to Encounter    Medication Sig Dispense Refill    ALLER-CHLOR 4 mg tablet       azelastine (ASTELIN) 137 mcg (0.1 %) nasal spray 1 spray by Nasal route.      carvediloL (COREG) 6.25 MG tablet Take 6.25 mg by mouth 2 (two) times daily.      cholecalciferol, vitamin D3, 125 mcg (5,000 unit) capsule Take 5,000 Units by mouth once daily.      cyanocobalamin 1,000 mcg/mL injection 1,000 mcg every 30 days.   1    inFLIXimab-dyyb (INFLECTRA) 100 mg injection Infuse 10 mg/kg into the vein every 4 weeks 7 each 3    losartan (COZAAR) 100 MG tablet Take 100 mg by mouth once daily.      pantoprazole (PROTONIX) 40 MG tablet TAKE 1 TABLET(40 MG) BY MOUTH DAILY 90 tablet 3    triamcinolone acetonide 0.1% (KENALOG) 0.1 % ointment Apply 1 application topically 2 (two) times daily.      acetaminophen-codeine 300-30mg (TYLENOL #3) 300-30 mg Tab Take by mouth.      EPOETIN BRIAN-EPBX INJ Inject as directed as needed (anemia).      fluticasone propionate (FLONASE) 50 mcg/actuation nasal spray 1 spray by Each Nostril route once daily. (Patient not taking: Reported on 3/10/2025)      penicillin v potassium (VEETID) 500 MG tablet Take 500 mg by mouth 4 (four) times daily.

## 2025-04-23 NOTE — ANESTHESIA PROCEDURE NOTES
Intubation    Date/Time: 4/23/2025 8:14 AM    Performed by: Sheela Goodwin CRNA  Authorized by: Lucita Ratliff MD    Intubation:     Induction:  Intravenous    Intubated:  Postinduction    Mask Ventilation:  Easy mask    Attempts:  1    Attempted By:  CRNA    Method of Intubation:  Video laryngoscopy    Blade:  Arnold 3    Laryngeal View Grade: Grade I - full view of cords      Difficult Airway Encountered?: No      Complications:  None    Airway Device:  Oral endotracheal tube    Airway Device Size:  7.0    Style/Cuff Inflation:  Cuffed (inflated to minimal occlusive pressure)    Tube secured:  22    Secured at:  The lips    Placement Verified By:  Capnometry    Complicating Factors:  None    Findings Post-Intubation:  BS equal bilateral and atraumatic/condition of teeth unchanged

## 2025-04-24 NOTE — OP NOTE
DATE OF PROCEDURE:  04/23/2025     PREOPERATIVE DIAGNOSES:   Crohn's disease  Anal stenosis     POSTOPERATIVE DIAGNOSES:   Crohn's disease  Anal stenosis    PROCEDURES PERFORMED:  Examination under anesthesia with anal dilation and biopsies     SURGEON:  Rhett Aparicio M.D.     ASSISTANT:  Ronald Crow M.D. [RES]     ANESTHESIA:  General endotracheal     IV FLUIDS:  500 mL of crystalloid.     ESTIMATED BLOOD LOSS:  <20 mL.     DRAINS:  None     SPECIMENS:  Anal canal biopsies    COMPLICATIONS:  None.     OPERATIVE FINDINGS:   Extensive perianal scarring from multiple prior surgical procedures  Multiple large hypertrophied, pedunculated anal skin tags  Tight anal anastomosis  Significant anal canal inflammatory changes     INDICATIONS:  The patient is a 64-year-old female with longstanding Crohn's disease.  She has undergone multiple prior abdominal surgeries and also has a symptomatic anal stenosis which requires periodic dilation.  She is being brought to the Operating Room today for examination under anesthesia, dilation of her anal stenosis, and biopsies to exclude dysplasia and/or cancer.     DESCRIPTION OF PROCEDURE:  The patient was identified, brought to the Operating Room and placed on the stretcher in a supine position after obtaining informed consent.  Venous sequential stockings were placed.  After induction of general endotracheal anesthesia, she was repositioned on the operating table in a prone position using chest rolls and adequate padding of all pressure points.  The table was jackknifed and the buttocks were taped apart to efface the anal verge.  The perianal region was prepped and draped in the usual sterile fashion.      On initial external inspection, she had extensive perianal scarring from multiple prior surgical procedures as well as multiple large hypertrophied, pedunculated anal skin tags.  With effacement of the anal verge, she was noted to have a significant anal stenosis.  This was gradually  dilated using sequentially larger Hegar dilators up to the point where I was able to insert my index finger, and eventually a small Hill-Neal anal retractor into the anal.  Inspection of the anal canal revealed significant inflammatory changes of the mucosa.  Several biopsies were taken sharply and sent to pathology to be evaluated for dysplastic changes.  Hemostasis was achieved with electrocautery.  An anal block was performed using 0.25% bupivacaine for postoperative analgesia.  Gel-Foam gauze was placed within the anal canal, and sterile dressings were applied.     The patient tolerated the procedure well with no complications.  She was extubated in the Operating Room and taken to Recovery in satisfactory condition.  All needle, instrument and sponge counts were correct at the end of the case.  I was present throughout the entire procedure.    Rhett Aparicio MD, FACS, FASC  Department of Colon & Rectal Surgery     This note was created using voice recognition software, and may contain some unrecognized transcriptional errors.

## 2025-04-25 ENCOUNTER — TELEPHONE (OUTPATIENT)
Dept: SURGERY | Facility: CLINIC | Age: 65
End: 2025-04-25
Payer: COMMERCIAL

## 2025-04-25 LAB
ESTROGEN SERPL-MCNC: NORMAL PG/ML
INSULIN SERPL-ACNC: NORMAL U[IU]/ML
LAB AP CLINICAL INFORMATION: NORMAL
LAB AP GROSS DESCRIPTION: NORMAL
LAB AP PERFORMING LOCATION(S): NORMAL
LAB AP REPORT FOOTNOTES: NORMAL

## 2025-07-08 ENCOUNTER — PATIENT MESSAGE (OUTPATIENT)
Dept: RESEARCH | Facility: HOSPITAL | Age: 65
End: 2025-07-08
Payer: COMMERCIAL

## 2025-08-13 ENCOUNTER — TELEPHONE (OUTPATIENT)
Dept: GASTROENTEROLOGY | Facility: CLINIC | Age: 65
End: 2025-08-13
Payer: COMMERCIAL

## (undated) DEVICE — PENCIL ROCKER SWITCH 10FT CORD

## (undated) DEVICE — ELECTRODE REM PLYHSV RETURN 9

## (undated) DEVICE — LUBRICANT SURGILUBE 2 OZ

## (undated) DEVICE — SYR 10CC LUER LOCK

## (undated) DEVICE — PENCIL SMK EVAC CONNECTOR 10FT

## (undated) DEVICE — TAPE SILK 3IN

## (undated) DEVICE — PANTIES FEMININE NAPKIN LG/XLG

## (undated) DEVICE — DRAPE LAP T SHT W/ INSTR PAD

## (undated) DEVICE — SPONGE COTTON TRAY 4X4IN

## (undated) DEVICE — TIP YANKAUERS BULB NO VENT

## (undated) DEVICE — SPONGE GELFOAM 12-7MM

## (undated) DEVICE — TRAY MINOR GEN SURG OMC

## (undated) DEVICE — SEE MEDLINE ITEM 157117

## (undated) DEVICE — ELECTRODE MEGADYNE RETURN DUAL

## (undated) DEVICE — TAPE SURG DURAPORE 2 X10YD

## (undated) DEVICE — SEE MEDLINE ITEM 154981

## (undated) DEVICE — SPONGE GAUZE 16PLY 4X4

## (undated) DEVICE — TRAY MINOR GEN SURG

## (undated) DEVICE — GAUZE SPONGE 4X4 12PLY

## (undated) DEVICE — SEE MEDLINE ITEM 146417

## (undated) DEVICE — SEE MEDLINE ITEM 152622

## (undated) DEVICE — SYR ONLY LUER LOCK 20CC

## (undated) DEVICE — TRAY SKIN SCRUB WET PREMIUM

## (undated) DEVICE — NDL 22GA X1 1/2 REG BEVEL

## (undated) DEVICE — SEE MEDLINE ITEM 157148